# Patient Record
Sex: FEMALE | Race: ASIAN | NOT HISPANIC OR LATINO
[De-identification: names, ages, dates, MRNs, and addresses within clinical notes are randomized per-mention and may not be internally consistent; named-entity substitution may affect disease eponyms.]

---

## 2017-06-05 PROBLEM — Z00.00 ENCOUNTER FOR PREVENTIVE HEALTH EXAMINATION: Status: ACTIVE | Noted: 2017-06-05

## 2017-06-06 ENCOUNTER — APPOINTMENT (OUTPATIENT)
Dept: SURGERY | Facility: CLINIC | Age: 52
End: 2017-06-06

## 2017-06-06 VITALS
HEART RATE: 82 BPM | TEMPERATURE: 98.2 F | BODY MASS INDEX: 30.21 KG/M2 | WEIGHT: 160 LBS | HEIGHT: 61 IN | DIASTOLIC BLOOD PRESSURE: 87 MMHG | SYSTOLIC BLOOD PRESSURE: 134 MMHG

## 2017-06-06 DIAGNOSIS — R73.03 PREDIABETES.: ICD-10-CM

## 2017-06-06 DIAGNOSIS — Z83.3 FAMILY HISTORY OF DIABETES MELLITUS: ICD-10-CM

## 2017-06-06 DIAGNOSIS — K80.20 CALCULUS OF GALLBLADDER W/OUT CHOLECYSTITIS W/OUT OBSTRUCTION: ICD-10-CM

## 2017-06-06 DIAGNOSIS — Z86.79 PERSONAL HISTORY OF OTHER DISEASES OF THE CIRCULATORY SYSTEM: ICD-10-CM

## 2017-06-06 RX ORDER — METFORMIN HYDROCHLORIDE 500 MG/1
500 TABLET, COATED ORAL
Refills: 0 | Status: ACTIVE | COMMUNITY

## 2017-06-06 RX ORDER — LOSARTAN POTASSIUM 100 MG/1
TABLET, FILM COATED ORAL
Refills: 0 | Status: ACTIVE | COMMUNITY

## 2017-06-20 ENCOUNTER — APPOINTMENT (OUTPATIENT)
Dept: SURGERY | Facility: CLINIC | Age: 52
End: 2017-06-20

## 2017-06-28 ENCOUNTER — OUTPATIENT (OUTPATIENT)
Dept: OUTPATIENT SERVICES | Facility: HOSPITAL | Age: 52
LOS: 1 days | End: 2017-06-28
Payer: COMMERCIAL

## 2017-06-28 VITALS
RESPIRATION RATE: 16 BRPM | HEART RATE: 82 BPM | HEIGHT: 61 IN | TEMPERATURE: 99 F | DIASTOLIC BLOOD PRESSURE: 90 MMHG | SYSTOLIC BLOOD PRESSURE: 153 MMHG | WEIGHT: 158.07 LBS

## 2017-06-28 DIAGNOSIS — Z01.818 ENCOUNTER FOR OTHER PREPROCEDURAL EXAMINATION: ICD-10-CM

## 2017-06-28 DIAGNOSIS — E11.9 TYPE 2 DIABETES MELLITUS WITHOUT COMPLICATIONS: ICD-10-CM

## 2017-06-28 DIAGNOSIS — K80.10 CALCULUS OF GALLBLADDER WITH CHRONIC CHOLECYSTITIS WITHOUT OBSTRUCTION: ICD-10-CM

## 2017-06-28 DIAGNOSIS — K80.20 CALCULUS OF GALLBLADDER WITHOUT CHOLECYSTITIS WITHOUT OBSTRUCTION: ICD-10-CM

## 2017-06-28 LAB
ANION GAP SERPL CALC-SCNC: 4 MMOL/L — LOW (ref 5–17)
BUN SERPL-MCNC: 14 MG/DL — SIGNIFICANT CHANGE UP (ref 7–23)
CALCIUM SERPL-MCNC: 8.8 MG/DL — SIGNIFICANT CHANGE UP (ref 8.5–10.1)
CHLORIDE SERPL-SCNC: 105 MMOL/L — SIGNIFICANT CHANGE UP (ref 96–108)
CO2 SERPL-SCNC: 30 MMOL/L — SIGNIFICANT CHANGE UP (ref 22–31)
CREAT SERPL-MCNC: 0.7 MG/DL — SIGNIFICANT CHANGE UP (ref 0.5–1.3)
GLUCOSE SERPL-MCNC: 103 MG/DL — HIGH (ref 70–99)
HBA1C BLD-MCNC: 6 % — HIGH (ref 4–5.6)
HCG UR QL: NEGATIVE — SIGNIFICANT CHANGE UP
HCT VFR BLD CALC: 39 % — SIGNIFICANT CHANGE UP (ref 34.5–45)
HGB BLD-MCNC: 12.4 G/DL — SIGNIFICANT CHANGE UP (ref 11.5–15.5)
MCHC RBC-ENTMCNC: 25.1 PG — LOW (ref 27–34)
MCHC RBC-ENTMCNC: 31.9 GM/DL — LOW (ref 32–36)
MCV RBC AUTO: 78.8 FL — LOW (ref 80–100)
PLATELET # BLD AUTO: 240 K/UL — SIGNIFICANT CHANGE UP (ref 150–400)
POTASSIUM SERPL-MCNC: 4.2 MMOL/L — SIGNIFICANT CHANGE UP (ref 3.5–5.3)
POTASSIUM SERPL-SCNC: 4.2 MMOL/L — SIGNIFICANT CHANGE UP (ref 3.5–5.3)
RBC # BLD: 4.95 M/UL — SIGNIFICANT CHANGE UP (ref 3.8–5.2)
RBC # FLD: 15.8 % — HIGH (ref 10.3–14.5)
SODIUM SERPL-SCNC: 139 MMOL/L — SIGNIFICANT CHANGE UP (ref 135–145)
WBC # BLD: 6.3 K/UL — SIGNIFICANT CHANGE UP (ref 3.8–10.5)
WBC # FLD AUTO: 6.3 K/UL — SIGNIFICANT CHANGE UP (ref 3.8–10.5)

## 2017-06-28 PROCEDURE — 86901 BLOOD TYPING SEROLOGIC RH(D): CPT

## 2017-06-28 PROCEDURE — 93010 ELECTROCARDIOGRAM REPORT: CPT | Mod: NC

## 2017-06-28 PROCEDURE — 86900 BLOOD TYPING SEROLOGIC ABO: CPT

## 2017-06-28 PROCEDURE — G0463: CPT

## 2017-06-28 PROCEDURE — 81025 URINE PREGNANCY TEST: CPT

## 2017-06-28 PROCEDURE — 83036 HEMOGLOBIN GLYCOSYLATED A1C: CPT

## 2017-06-28 PROCEDURE — 86850 RBC ANTIBODY SCREEN: CPT

## 2017-06-28 PROCEDURE — 80048 BASIC METABOLIC PNL TOTAL CA: CPT

## 2017-06-28 PROCEDURE — 36415 COLL VENOUS BLD VENIPUNCTURE: CPT

## 2017-06-28 PROCEDURE — 80076 HEPATIC FUNCTION PANEL: CPT

## 2017-06-28 PROCEDURE — 85027 COMPLETE CBC AUTOMATED: CPT

## 2017-06-28 PROCEDURE — 93005 ELECTROCARDIOGRAM TRACING: CPT

## 2017-06-28 NOTE — H&P PST ADULT - HISTORY OF PRESENT ILLNESS
52 y/o female with the diagnosis of gall stones since last year, went for follow up with PCP, advised to have laparoscopic cholecystectomy. Denies any abdominal pain , nausea or vomiting.

## 2017-06-28 NOTE — H&P PST ADULT - NSANTHOSAYNRD_GEN_A_CORE
No. YEN screening performed.  STOP BANG Legend: 0-2 = LOW Risk; 3-4 = INTERMEDIATE Risk; 5-8 = HIGH Risk

## 2017-06-28 NOTE — H&P PST ADULT - FAMILY HISTORY
Father  Still living? Yes, Estimated age: 81-90  Family history of type 2 diabetes mellitus, Age at diagnosis: Age Unknown     Mother  Still living? Yes, Estimated age: 81-90  Family history of type 2 diabetes mellitus, Age at diagnosis: Age Unknown

## 2017-06-28 NOTE — H&P PST ADULT - VISION (WITH CORRECTIVE LENSES IF THE PATIENT USUALLY WEARS THEM):
glasses to read/Partially impaired: cannot see medication labels or newsprint, but can see obstacles in path, and the surrounding layout; can count fingers at arm's length

## 2017-07-06 ENCOUNTER — OUTPATIENT (OUTPATIENT)
Dept: OUTPATIENT SERVICES | Facility: HOSPITAL | Age: 52
LOS: 1 days | Discharge: HOME | End: 2017-07-06
Payer: COMMERCIAL

## 2017-07-06 ENCOUNTER — RESULT REVIEW (OUTPATIENT)
Age: 52
End: 2017-07-06

## 2017-07-06 ENCOUNTER — TRANSCRIPTION ENCOUNTER (OUTPATIENT)
Age: 52
End: 2017-07-06

## 2017-07-06 DIAGNOSIS — K80.10 CALCULUS OF GALLBLADDER WITH CHRONIC CHOLECYSTITIS WITHOUT OBSTRUCTION: ICD-10-CM

## 2017-07-06 PROCEDURE — 88304 TISSUE EXAM BY PATHOLOGIST: CPT | Mod: 26

## 2017-07-06 PROCEDURE — 47562 LAPAROSCOPIC CHOLECYSTECTOMY: CPT

## 2017-07-06 PROCEDURE — 81025 URINE PREGNANCY TEST: CPT

## 2017-07-06 PROCEDURE — 88304 TISSUE EXAM BY PATHOLOGIST: CPT

## 2017-07-06 PROCEDURE — 47562 LAPAROSCOPIC CHOLECYSTECTOMY: CPT | Mod: AS

## 2017-07-06 RX ORDER — SODIUM CHLORIDE 9 MG/ML
1000 INJECTION, SOLUTION INTRAVENOUS
Qty: 0 | Refills: 0 | Status: DISCONTINUED | OUTPATIENT
Start: 2017-07-06 | End: 2017-07-21

## 2017-07-06 RX ORDER — HYDROMORPHONE HYDROCHLORIDE 2 MG/ML
0.5 INJECTION INTRAMUSCULAR; INTRAVENOUS; SUBCUTANEOUS
Qty: 0 | Refills: 0 | Status: DISCONTINUED | OUTPATIENT
Start: 2017-07-06 | End: 2017-07-06

## 2017-07-06 RX ORDER — OXYCODONE HYDROCHLORIDE 5 MG/1
15 TABLET ORAL ONCE
Qty: 0 | Refills: 0 | Status: DISCONTINUED | OUTPATIENT
Start: 2017-07-06 | End: 2017-07-06

## 2017-07-06 RX ORDER — OXYCODONE HYDROCHLORIDE 5 MG/1
1 TABLET ORAL
Qty: 12 | Refills: 0
Start: 2017-07-06

## 2017-07-10 DIAGNOSIS — I10 ESSENTIAL (PRIMARY) HYPERTENSION: ICD-10-CM

## 2017-07-10 DIAGNOSIS — K80.10 CALCULUS OF GALLBLADDER WITH CHRONIC CHOLECYSTITIS WITHOUT OBSTRUCTION: ICD-10-CM

## 2017-07-10 DIAGNOSIS — E03.9 HYPOTHYROIDISM, UNSPECIFIED: ICD-10-CM

## 2017-07-18 ENCOUNTER — APPOINTMENT (OUTPATIENT)
Dept: SURGERY | Facility: CLINIC | Age: 52
End: 2017-07-18

## 2017-07-18 VITALS
HEART RATE: 83 BPM | HEIGHT: 61 IN | BODY MASS INDEX: 30.21 KG/M2 | SYSTOLIC BLOOD PRESSURE: 136 MMHG | TEMPERATURE: 98.1 F | DIASTOLIC BLOOD PRESSURE: 97 MMHG | WEIGHT: 160 LBS

## 2017-07-18 DIAGNOSIS — Z90.49 ACQUIRED ABSENCE OF OTHER SPECIFIED PARTS OF DIGESTIVE TRACT: ICD-10-CM

## 2017-11-06 NOTE — H&P PST ADULT - ASSESSMENT
ira 50 y/o female with the diagnosis of gall stones, no c/o of nausea or vomiting, some abdominal discomfort only. Pre op testing today. Medical eval with Dr Quinonez.

## 2018-02-17 ENCOUNTER — EMERGENCY (EMERGENCY)
Facility: HOSPITAL | Age: 53
LOS: 1 days | Discharge: HOME | End: 2018-02-17
Attending: EMERGENCY MEDICINE | Admitting: EMERGENCY MEDICINE
Payer: MEDICAID

## 2018-02-17 VITALS
OXYGEN SATURATION: 100 % | TEMPERATURE: 98 F | HEART RATE: 81 BPM | DIASTOLIC BLOOD PRESSURE: 87 MMHG | SYSTOLIC BLOOD PRESSURE: 146 MMHG | RESPIRATION RATE: 18 BRPM

## 2018-02-17 LAB
ALBUMIN SERPL ELPH-MCNC: 4.4 G/DL — SIGNIFICANT CHANGE UP (ref 3.3–5)
ALP SERPL-CCNC: 36 U/L — LOW (ref 40–120)
ALT FLD-CCNC: 30 U/L — SIGNIFICANT CHANGE UP (ref 4–33)
APPEARANCE UR: SIGNIFICANT CHANGE UP
AST SERPL-CCNC: 24 U/L — SIGNIFICANT CHANGE UP (ref 4–32)
BACTERIA # UR AUTO: HIGH
BASOPHILS # BLD AUTO: 0.02 K/UL — SIGNIFICANT CHANGE UP (ref 0–0.2)
BASOPHILS NFR BLD AUTO: 0.3 % — SIGNIFICANT CHANGE UP (ref 0–2)
BILIRUB SERPL-MCNC: 0.4 MG/DL — SIGNIFICANT CHANGE UP (ref 0.2–1.2)
BILIRUB UR-MCNC: NEGATIVE — SIGNIFICANT CHANGE UP
BLOOD UR QL VISUAL: NEGATIVE — SIGNIFICANT CHANGE UP
BUN SERPL-MCNC: 10 MG/DL — SIGNIFICANT CHANGE UP (ref 7–23)
CALCIUM SERPL-MCNC: 8.8 MG/DL — SIGNIFICANT CHANGE UP (ref 8.4–10.5)
CHLORIDE SERPL-SCNC: 104 MMOL/L — SIGNIFICANT CHANGE UP (ref 98–107)
CO2 SERPL-SCNC: 24 MMOL/L — SIGNIFICANT CHANGE UP (ref 22–31)
COLOR SPEC: YELLOW — SIGNIFICANT CHANGE UP
CREAT SERPL-MCNC: 0.72 MG/DL — SIGNIFICANT CHANGE UP (ref 0.5–1.3)
EOSINOPHIL # BLD AUTO: 0.06 K/UL — SIGNIFICANT CHANGE UP (ref 0–0.5)
EOSINOPHIL NFR BLD AUTO: 1 % — SIGNIFICANT CHANGE UP (ref 0–6)
GLUCOSE SERPL-MCNC: 112 MG/DL — HIGH (ref 70–99)
GLUCOSE UR-MCNC: NEGATIVE — SIGNIFICANT CHANGE UP
HCT VFR BLD CALC: 37.5 % — SIGNIFICANT CHANGE UP (ref 34.5–45)
HGB BLD-MCNC: 11.9 G/DL — SIGNIFICANT CHANGE UP (ref 11.5–15.5)
HYALINE CASTS # UR AUTO: SIGNIFICANT CHANGE UP (ref 0–?)
IMM GRANULOCYTES # BLD AUTO: 0.01 # — SIGNIFICANT CHANGE UP
IMM GRANULOCYTES NFR BLD AUTO: 0.2 % — SIGNIFICANT CHANGE UP (ref 0–1.5)
KETONES UR-MCNC: NEGATIVE — SIGNIFICANT CHANGE UP
LEUKOCYTE ESTERASE UR-ACNC: HIGH
LYMPHOCYTES # BLD AUTO: 2.32 K/UL — SIGNIFICANT CHANGE UP (ref 1–3.3)
LYMPHOCYTES # BLD AUTO: 38 % — SIGNIFICANT CHANGE UP (ref 13–44)
MCHC RBC-ENTMCNC: 23.6 PG — LOW (ref 27–34)
MCHC RBC-ENTMCNC: 31.7 % — LOW (ref 32–36)
MCV RBC AUTO: 74.4 FL — LOW (ref 80–100)
MONOCYTES # BLD AUTO: 0.39 K/UL — SIGNIFICANT CHANGE UP (ref 0–0.9)
MONOCYTES NFR BLD AUTO: 6.4 % — SIGNIFICANT CHANGE UP (ref 2–14)
MUCOUS THREADS # UR AUTO: SIGNIFICANT CHANGE UP
NEUTROPHILS # BLD AUTO: 3.3 K/UL — SIGNIFICANT CHANGE UP (ref 1.8–7.4)
NEUTROPHILS NFR BLD AUTO: 54.1 % — SIGNIFICANT CHANGE UP (ref 43–77)
NITRITE UR-MCNC: NEGATIVE — SIGNIFICANT CHANGE UP
NRBC # FLD: 0 — SIGNIFICANT CHANGE UP
PH UR: 5.5 — SIGNIFICANT CHANGE UP (ref 4.6–8)
PLATELET # BLD AUTO: 246 K/UL — SIGNIFICANT CHANGE UP (ref 150–400)
PMV BLD: 11.1 FL — SIGNIFICANT CHANGE UP (ref 7–13)
POTASSIUM SERPL-MCNC: 4.4 MMOL/L — SIGNIFICANT CHANGE UP (ref 3.5–5.3)
POTASSIUM SERPL-SCNC: 4.4 MMOL/L — SIGNIFICANT CHANGE UP (ref 3.5–5.3)
PROT SERPL-MCNC: 7.5 G/DL — SIGNIFICANT CHANGE UP (ref 6–8.3)
PROT UR-MCNC: NEGATIVE MG/DL — SIGNIFICANT CHANGE UP
RBC # BLD: 5.04 M/UL — SIGNIFICANT CHANGE UP (ref 3.8–5.2)
RBC # FLD: 17.6 % — HIGH (ref 10.3–14.5)
RBC CASTS # UR COMP ASSIST: SIGNIFICANT CHANGE UP (ref 0–?)
SODIUM SERPL-SCNC: 141 MMOL/L — SIGNIFICANT CHANGE UP (ref 135–145)
SP GR SPEC: 1.02 — SIGNIFICANT CHANGE UP (ref 1–1.04)
SQUAMOUS # UR AUTO: SIGNIFICANT CHANGE UP
UROBILINOGEN FLD QL: NORMAL MG/DL — SIGNIFICANT CHANGE UP
WBC # BLD: 6.1 K/UL — SIGNIFICANT CHANGE UP (ref 3.8–10.5)
WBC # FLD AUTO: 6.1 K/UL — SIGNIFICANT CHANGE UP (ref 3.8–10.5)
WBC UR QL: HIGH (ref 0–?)
YEAST BUDDING # UR COMP ASSIST: SIGNIFICANT CHANGE UP

## 2018-02-17 PROCEDURE — 99284 EMERGENCY DEPT VISIT MOD MDM: CPT

## 2018-02-17 PROCEDURE — 70450 CT HEAD/BRAIN W/O DYE: CPT | Mod: 26

## 2018-02-17 RX ORDER — CIPROFLOXACIN LACTATE 400MG/40ML
500 VIAL (ML) INTRAVENOUS ONCE
Qty: 0 | Refills: 0 | Status: COMPLETED | OUTPATIENT
Start: 2018-02-17 | End: 2018-02-17

## 2018-02-17 RX ORDER — MOXIFLOXACIN HYDROCHLORIDE TABLETS, 400 MG 400 MG/1
1 TABLET, FILM COATED ORAL
Qty: 14 | Refills: 0
Start: 2018-02-17 | End: 2018-02-23

## 2018-02-17 RX ORDER — SODIUM CHLORIDE 9 MG/ML
1000 INJECTION INTRAMUSCULAR; INTRAVENOUS; SUBCUTANEOUS ONCE
Qty: 0 | Refills: 0 | Status: COMPLETED | OUTPATIENT
Start: 2018-02-17 | End: 2018-02-17

## 2018-02-17 RX ADMIN — Medication 500 MILLIGRAM(S): at 13:17

## 2018-02-17 RX ADMIN — SODIUM CHLORIDE 1000 MILLILITER(S): 9 INJECTION INTRAMUSCULAR; INTRAVENOUS; SUBCUTANEOUS at 11:43

## 2018-02-17 NOTE — ED PROVIDER NOTE - MEDICAL DECISION MAKING DETAILS
pt likely has stres sand anxiety, pt has sub findings of weakness, will do ct head to r/o any remote stroke, which is unlikely , advsied to follow upw ith neurology and rheumatology on outpatient basis

## 2018-02-17 NOTE — ED PROVIDER NOTE - CRANIAL NERVE AND PUPILLARY EXAM
central and peripheral vision intact/extra-ocular movements intact/cough reflex intact/corneal reflex intact/gag reflex intact/tongue is midline/cranial nerves 2-12 intact

## 2018-02-17 NOTE — ED PROVIDER NOTE - OBJECTIVE STATEMENT
53 y/o F with no known medical problems except unspecified rt sided weakness off on for years p/w recurrent rt sided weakness for 3 days , both rt upper arm and rt leg. Pt states that she basically came for her  who has chest pain but wanted to check herself out as well as she has stress and now her symptoms are worsening. Pt had mri once long time ago for same complaint. No fall ro trauma , no other weakness, numbness, no difficulty walking. No arthalgia or joint stiffness reported by patient.

## 2018-02-17 NOTE — ED ADULT TRIAGE NOTE - CHIEF COMPLAINT QUOTE
Pt accompanied by  who is c/o chest pain--pt states while she is here she would also like to be seen for rt sided pain from shoulder to leg with tingling and heaviness x2 days  Pt states that she has had a prior MRI that showed rt sided weakness?--and these symptoms come and go last event was 6 months ago  No slurred speech/facial droop/ and difficulty walking--no numbness

## 2018-02-17 NOTE — ED PROVIDER NOTE - PROGRESS NOTE DETAILS
Damien ALONZO- pt told about findings, advsied to take cipro for mild uti, drink lots of fludis and follow up as outpatient

## 2018-07-28 ENCOUNTER — EMERGENCY (EMERGENCY)
Facility: HOSPITAL | Age: 53
LOS: 1 days | Discharge: HOME | End: 2018-07-28
Attending: EMERGENCY MEDICINE | Admitting: EMERGENCY MEDICINE
Payer: MEDICAID

## 2018-07-28 VITALS
OXYGEN SATURATION: 100 % | RESPIRATION RATE: 16 BRPM | SYSTOLIC BLOOD PRESSURE: 175 MMHG | HEART RATE: 88 BPM | DIASTOLIC BLOOD PRESSURE: 103 MMHG

## 2018-07-28 VITALS
OXYGEN SATURATION: 100 % | SYSTOLIC BLOOD PRESSURE: 161 MMHG | DIASTOLIC BLOOD PRESSURE: 97 MMHG | HEART RATE: 80 BPM | TEMPERATURE: 98 F | RESPIRATION RATE: 16 BRPM

## 2018-07-28 LAB
ALBUMIN SERPL ELPH-MCNC: 4.6 G/DL — SIGNIFICANT CHANGE UP (ref 3.3–5)
ALP SERPL-CCNC: 50 U/L — SIGNIFICANT CHANGE UP (ref 40–120)
ALT FLD-CCNC: 52 U/L — HIGH (ref 4–33)
AST SERPL-CCNC: 34 U/L — HIGH (ref 4–32)
BASOPHILS # BLD AUTO: 0.02 K/UL — SIGNIFICANT CHANGE UP (ref 0–0.2)
BASOPHILS NFR BLD AUTO: 0.2 % — SIGNIFICANT CHANGE UP (ref 0–2)
BILIRUB SERPL-MCNC: 0.5 MG/DL — SIGNIFICANT CHANGE UP (ref 0.2–1.2)
BUN SERPL-MCNC: 11 MG/DL — SIGNIFICANT CHANGE UP (ref 7–23)
CALCIUM SERPL-MCNC: 9 MG/DL — SIGNIFICANT CHANGE UP (ref 8.4–10.5)
CHLORIDE SERPL-SCNC: 99 MMOL/L — SIGNIFICANT CHANGE UP (ref 98–107)
CO2 SERPL-SCNC: 24 MMOL/L — SIGNIFICANT CHANGE UP (ref 22–31)
CREAT SERPL-MCNC: 0.72 MG/DL — SIGNIFICANT CHANGE UP (ref 0.5–1.3)
CRP SERPL-MCNC: 7.3 MG/L — HIGH
EOSINOPHIL # BLD AUTO: 0.02 K/UL — SIGNIFICANT CHANGE UP (ref 0–0.5)
EOSINOPHIL NFR BLD AUTO: 0.2 % — SIGNIFICANT CHANGE UP (ref 0–6)
ERYTHROCYTE [SEDIMENTATION RATE] IN BLOOD: 12 MM/HR — SIGNIFICANT CHANGE UP (ref 4–25)
GLUCOSE SERPL-MCNC: 113 MG/DL — HIGH (ref 70–99)
HCT VFR BLD CALC: 37.7 % — SIGNIFICANT CHANGE UP (ref 34.5–45)
HGB BLD-MCNC: 12 G/DL — SIGNIFICANT CHANGE UP (ref 11.5–15.5)
IMM GRANULOCYTES # BLD AUTO: 0.02 # — SIGNIFICANT CHANGE UP
IMM GRANULOCYTES NFR BLD AUTO: 0.2 % — SIGNIFICANT CHANGE UP (ref 0–1.5)
LYMPHOCYTES # BLD AUTO: 1.74 K/UL — SIGNIFICANT CHANGE UP (ref 1–3.3)
LYMPHOCYTES # BLD AUTO: 20.1 % — SIGNIFICANT CHANGE UP (ref 13–44)
MCHC RBC-ENTMCNC: 24.1 PG — LOW (ref 27–34)
MCHC RBC-ENTMCNC: 31.8 % — LOW (ref 32–36)
MCV RBC AUTO: 75.9 FL — LOW (ref 80–100)
MONOCYTES # BLD AUTO: 0.68 K/UL — SIGNIFICANT CHANGE UP (ref 0–0.9)
MONOCYTES NFR BLD AUTO: 7.8 % — SIGNIFICANT CHANGE UP (ref 2–14)
NEUTROPHILS # BLD AUTO: 6.19 K/UL — SIGNIFICANT CHANGE UP (ref 1.8–7.4)
NEUTROPHILS NFR BLD AUTO: 71.5 % — SIGNIFICANT CHANGE UP (ref 43–77)
NRBC # FLD: 0 — SIGNIFICANT CHANGE UP
PLATELET # BLD AUTO: 273 K/UL — SIGNIFICANT CHANGE UP (ref 150–400)
PMV BLD: 11.3 FL — SIGNIFICANT CHANGE UP (ref 7–13)
POTASSIUM SERPL-MCNC: 4.5 MMOL/L — SIGNIFICANT CHANGE UP (ref 3.5–5.3)
POTASSIUM SERPL-SCNC: 4.5 MMOL/L — SIGNIFICANT CHANGE UP (ref 3.5–5.3)
PROT SERPL-MCNC: 8.1 G/DL — SIGNIFICANT CHANGE UP (ref 6–8.3)
RBC # BLD: 4.97 M/UL — SIGNIFICANT CHANGE UP (ref 3.8–5.2)
RBC # FLD: 18 % — HIGH (ref 10.3–14.5)
SODIUM SERPL-SCNC: 137 MMOL/L — SIGNIFICANT CHANGE UP (ref 135–145)
URATE SERPL-MCNC: 3.9 MG/DL — SIGNIFICANT CHANGE UP (ref 2.5–7)
WBC # BLD: 8.67 K/UL — SIGNIFICANT CHANGE UP (ref 3.8–10.5)
WBC # FLD AUTO: 8.67 K/UL — SIGNIFICANT CHANGE UP (ref 3.8–10.5)

## 2018-07-28 PROCEDURE — 73110 X-RAY EXAM OF WRIST: CPT | Mod: 26,LT

## 2018-07-28 PROCEDURE — 99284 EMERGENCY DEPT VISIT MOD MDM: CPT

## 2018-07-28 RX ORDER — AMPICILLIN SODIUM AND SULBACTAM SODIUM 250; 125 MG/ML; MG/ML
INJECTION, POWDER, FOR SUSPENSION INTRAMUSCULAR; INTRAVENOUS
Qty: 0 | Refills: 0 | Status: DISCONTINUED | OUTPATIENT
Start: 2018-07-28 | End: 2018-08-01

## 2018-07-28 RX ORDER — ACETAMINOPHEN 500 MG
650 TABLET ORAL ONCE
Qty: 0 | Refills: 0 | Status: COMPLETED | OUTPATIENT
Start: 2018-07-28 | End: 2018-07-28

## 2018-07-28 RX ORDER — OXYCODONE AND ACETAMINOPHEN 5; 325 MG/1; MG/1
1 TABLET ORAL ONCE
Qty: 0 | Refills: 0 | Status: DISCONTINUED | OUTPATIENT
Start: 2018-07-28 | End: 2018-07-28

## 2018-07-28 RX ORDER — INDOMETHACIN 50 MG
1 CAPSULE ORAL
Qty: 21 | Refills: 0
Start: 2018-07-28 | End: 2018-08-03

## 2018-07-28 RX ORDER — AMPICILLIN SODIUM AND SULBACTAM SODIUM 250; 125 MG/ML; MG/ML
3 INJECTION, POWDER, FOR SUSPENSION INTRAMUSCULAR; INTRAVENOUS EVERY 6 HOURS
Qty: 0 | Refills: 0 | Status: DISCONTINUED | OUTPATIENT
Start: 2018-07-29 | End: 2018-08-01

## 2018-07-28 RX ORDER — AMPICILLIN SODIUM AND SULBACTAM SODIUM 250; 125 MG/ML; MG/ML
3 INJECTION, POWDER, FOR SUSPENSION INTRAMUSCULAR; INTRAVENOUS ONCE
Qty: 0 | Refills: 0 | Status: COMPLETED | OUTPATIENT
Start: 2018-07-28 | End: 2018-07-28

## 2018-07-28 RX ORDER — IBUPROFEN 200 MG
600 TABLET ORAL ONCE
Qty: 0 | Refills: 0 | Status: COMPLETED | OUTPATIENT
Start: 2018-07-28 | End: 2018-07-28

## 2018-07-28 RX ADMIN — Medication 600 MILLIGRAM(S): at 21:48

## 2018-07-28 RX ADMIN — OXYCODONE AND ACETAMINOPHEN 1 TABLET(S): 5; 325 TABLET ORAL at 21:59

## 2018-07-28 RX ADMIN — AMPICILLIN SODIUM AND SULBACTAM SODIUM 3 GRAM(S): 250; 125 INJECTION, POWDER, FOR SUSPENSION INTRAMUSCULAR; INTRAVENOUS at 21:59

## 2018-07-28 RX ADMIN — Medication 650 MILLIGRAM(S): at 19:45

## 2018-07-28 RX ADMIN — Medication 650 MILLIGRAM(S): at 21:48

## 2018-07-28 RX ADMIN — Medication 600 MILLIGRAM(S): at 19:45

## 2018-07-28 RX ADMIN — OXYCODONE AND ACETAMINOPHEN 1 TABLET(S): 5; 325 TABLET ORAL at 21:52

## 2018-07-28 RX ADMIN — AMPICILLIN SODIUM AND SULBACTAM SODIUM 200 GRAM(S): 250; 125 INJECTION, POWDER, FOR SUSPENSION INTRAMUSCULAR; INTRAVENOUS at 21:30

## 2018-07-28 NOTE — ED PROVIDER NOTE - CARE PLAN
Principal Discharge DX:	Hand pain, left  Assessment and plan of treatment:	Follow up with a primary care doctor within 48-72 hours.  You can call: [facility] Find a Physician helpline ([phone]) for assistance. Take copies of your reports given to you.  Take all of your medications as previously prescribed. Take the antibiotic Augmentin 875mg 1 tab 2x/day for 7 days. Take Indomethacin 50mg 1 tab 3x/day for pain and swelling. Return to the emergency department with increased swelling, pain, redness, fever, chills, nausea, vomiting or ANY new concerning symptoms.

## 2018-07-28 NOTE — ED PROVIDER NOTE - PROGRESS NOTE DETAILS
SAGE KAISER TIBERIO- pt still with significant swelling and pain. Labs stable. Xray negative for acute fracture. Will send to CDU for continued pain management/NSAIDS, ABX and reassessment. Likely cellulitis vs gout. SAGE MACKENZIE- Pt refusing to stay. Wants to go home and try antibiotic at her house. Understands to risks of worsening infection. Agrees to return with increased swelling, pain, redness, fever, chills, nausea, vomiting or any new concerning symptoms.  Will DC home on Augmentin/Indomethacin with PMD follow up. Copies of all reports given to pt.

## 2018-07-28 NOTE — ED PROVIDER NOTE - PLAN OF CARE
Follow up with a primary care doctor within 48-72 hours.  You can call: [facility] Find a Physician helpline ([phone]) for assistance. Take copies of your reports given to you.  Take all of your medications as previously prescribed. Take the antibiotic Augmentin 875mg 1 tab 2x/day for 7 days. Take Indomethacin 50mg 1 tab 3x/day for pain and swelling. Return to the emergency department with increased swelling, pain, redness, fever, chills, nausea, vomiting or ANY new concerning symptoms.

## 2018-07-28 NOTE — ED PROVIDER NOTE - MEDICAL DECISION MAKING DETAILS
54y/o F with atraumatic L wrist pain. DDx includes Gout vs. Cellulitis. Unlikely to be septic joint. Will perform XR, labs including ESR, CRP, and uric acid, pain control, reassess.

## 2018-07-28 NOTE — ED PROVIDER NOTE - UPPER EXTREMITY EXAM, LEFT
Dorsum of L wrist warm, mild erythema, and TTP. Neurovasuclarloy intact, 2+ radial pulses. No crepitus, no bony deformity.

## 2018-07-28 NOTE — ED ADULT NURSE NOTE - OBJECTIVE STATEMENT
pt AO x3, ambulatory, c/o left hand pain beginning yesterday night. She now has some mild swelling to the area and radiation to her L wrist and forearm. Denies chest pain, dizziness, SOB, fever, n/v at this time. Evaluated by provider. Blood obtained and sent to LAB. IV inserted on right arm 20G. Due medication given as ordered. Will continue to monitor. Awaiting further study.

## 2018-07-28 NOTE — ED PROVIDER NOTE - OBJECTIVE STATEMENT
54y/o F with PMHx of HTN, DM, presents to the ED with L hand pain beginning yesterday night. She now has some mild swelling to the area and radiation to her L wrist and forearm. Pt has been taking Advil to mild relief. Denies numbness/tingling, fever, vomiting, trauma, fall, injury, any other complaints. NKDA.

## 2018-07-29 PROBLEM — E11.9 TYPE 2 DIABETES MELLITUS WITHOUT COMPLICATIONS: Chronic | Status: ACTIVE | Noted: 2017-06-28

## 2018-07-29 PROBLEM — I10 ESSENTIAL (PRIMARY) HYPERTENSION: Chronic | Status: ACTIVE | Noted: 2017-06-28

## 2019-09-16 NOTE — H&P PST ADULT - RS GEN PE MLT RESP DETAILS PC
normal breath sounds equal/normal/good air movement/airway patent/clear to auscultation bilaterally/respirations non-labored

## 2020-03-28 NOTE — ED PROVIDER NOTE - CROS ED NEURO ALL NEG
negative...
76 y/o F with CAD s/p PCI 2014, HTN, HLD admitted with hypoxic respiratory failure.   Patient has anemia, thrombocytopenia and JENI. Patient has protein band on SPEP with suspected MDS vs MGUS/ plasma cell dyscrasia, but states that she had a negative BM bx for malignancy.

## 2021-06-26 ENCOUNTER — INPATIENT (INPATIENT)
Facility: HOSPITAL | Age: 56
LOS: 6 days | Discharge: HOME | End: 2021-07-03
Attending: INTERNAL MEDICINE | Admitting: INTERNAL MEDICINE
Payer: MEDICAID

## 2021-06-26 VITALS
SYSTOLIC BLOOD PRESSURE: 172 MMHG | RESPIRATION RATE: 18 BRPM | TEMPERATURE: 98 F | HEART RATE: 92 BPM | HEIGHT: 61 IN | OXYGEN SATURATION: 100 % | DIASTOLIC BLOOD PRESSURE: 90 MMHG

## 2021-06-26 DIAGNOSIS — A15.9 RESPIRATORY TUBERCULOSIS UNSPECIFIED: ICD-10-CM

## 2021-06-26 LAB
ALBUMIN SERPL ELPH-MCNC: 4.5 G/DL — SIGNIFICANT CHANGE UP (ref 3.3–5)
ALP SERPL-CCNC: 46 U/L — SIGNIFICANT CHANGE UP (ref 40–120)
ALT FLD-CCNC: 28 U/L — SIGNIFICANT CHANGE UP (ref 4–33)
ANION GAP SERPL CALC-SCNC: 13 MMOL/L — SIGNIFICANT CHANGE UP (ref 7–14)
AST SERPL-CCNC: 19 U/L — SIGNIFICANT CHANGE UP (ref 4–32)
BASOPHILS # BLD AUTO: 0.01 K/UL — SIGNIFICANT CHANGE UP (ref 0–0.2)
BASOPHILS NFR BLD AUTO: 0.2 % — SIGNIFICANT CHANGE UP (ref 0–2)
BILIRUB SERPL-MCNC: 0.4 MG/DL — SIGNIFICANT CHANGE UP (ref 0.2–1.2)
BUN SERPL-MCNC: 14 MG/DL — SIGNIFICANT CHANGE UP (ref 7–23)
CALCIUM SERPL-MCNC: 9.6 MG/DL — SIGNIFICANT CHANGE UP (ref 8.4–10.5)
CHLORIDE SERPL-SCNC: 105 MMOL/L — SIGNIFICANT CHANGE UP (ref 98–107)
CO2 SERPL-SCNC: 23 MMOL/L — SIGNIFICANT CHANGE UP (ref 22–31)
CREAT SERPL-MCNC: 0.72 MG/DL — SIGNIFICANT CHANGE UP (ref 0.5–1.3)
EOSINOPHIL # BLD AUTO: 0.03 K/UL — SIGNIFICANT CHANGE UP (ref 0–0.5)
EOSINOPHIL NFR BLD AUTO: 0.5 % — SIGNIFICANT CHANGE UP (ref 0–6)
GLUCOSE SERPL-MCNC: 98 MG/DL — SIGNIFICANT CHANGE UP (ref 70–99)
HCT VFR BLD CALC: 39 % — SIGNIFICANT CHANGE UP (ref 34.5–45)
HGB BLD-MCNC: 12.8 G/DL — SIGNIFICANT CHANGE UP (ref 11.5–15.5)
IANC: 3.24 K/UL — SIGNIFICANT CHANGE UP (ref 1.5–8.5)
IMM GRANULOCYTES NFR BLD AUTO: 0.3 % — SIGNIFICANT CHANGE UP (ref 0–1.5)
LYMPHOCYTES # BLD AUTO: 2.11 K/UL — SIGNIFICANT CHANGE UP (ref 1–3.3)
LYMPHOCYTES # BLD AUTO: 36.1 % — SIGNIFICANT CHANGE UP (ref 13–44)
MCHC RBC-ENTMCNC: 27.4 PG — SIGNIFICANT CHANGE UP (ref 27–34)
MCHC RBC-ENTMCNC: 32.8 GM/DL — SIGNIFICANT CHANGE UP (ref 32–36)
MCV RBC AUTO: 83.3 FL — SIGNIFICANT CHANGE UP (ref 80–100)
MONOCYTES # BLD AUTO: 0.44 K/UL — SIGNIFICANT CHANGE UP (ref 0–0.9)
MONOCYTES NFR BLD AUTO: 7.5 % — SIGNIFICANT CHANGE UP (ref 2–14)
NEUTROPHILS # BLD AUTO: 3.24 K/UL — SIGNIFICANT CHANGE UP (ref 1.8–7.4)
NEUTROPHILS NFR BLD AUTO: 55.4 % — SIGNIFICANT CHANGE UP (ref 43–77)
NRBC # BLD: 0 /100 WBCS — SIGNIFICANT CHANGE UP
NRBC # FLD: 0 K/UL — SIGNIFICANT CHANGE UP
PLATELET # BLD AUTO: 232 K/UL — SIGNIFICANT CHANGE UP (ref 150–400)
POTASSIUM SERPL-MCNC: 4.2 MMOL/L — SIGNIFICANT CHANGE UP (ref 3.5–5.3)
POTASSIUM SERPL-SCNC: 4.2 MMOL/L — SIGNIFICANT CHANGE UP (ref 3.5–5.3)
PROT SERPL-MCNC: 7.5 G/DL — SIGNIFICANT CHANGE UP (ref 6–8.3)
RBC # BLD: 4.68 M/UL — SIGNIFICANT CHANGE UP (ref 3.8–5.2)
RBC # FLD: 13.7 % — SIGNIFICANT CHANGE UP (ref 10.3–14.5)
SODIUM SERPL-SCNC: 141 MMOL/L — SIGNIFICANT CHANGE UP (ref 135–145)
WBC # BLD: 5.85 K/UL — SIGNIFICANT CHANGE UP (ref 3.8–10.5)
WBC # FLD AUTO: 5.85 K/UL — SIGNIFICANT CHANGE UP (ref 3.8–10.5)

## 2021-06-26 PROCEDURE — 99285 EMERGENCY DEPT VISIT HI MDM: CPT

## 2021-06-26 PROCEDURE — 71045 X-RAY EXAM CHEST 1 VIEW: CPT | Mod: 26

## 2021-06-26 NOTE — ED PROVIDER NOTE - CLINICAL SUMMARY MEDICAL DECISION MAKING FREE TEXT BOX
Pt sent in by her pulm d/t abnormal imaging after PNA tx (PET, CT).  Sent for rt/o Tb and for further eval of possibhle neoplasm.  Will sendl abs, sputum, quantiferon, admit. Dr. Carrillo requesting admit to Dr. Saleem and states he also d/w Pulm fellow at [facility].

## 2021-06-26 NOTE — ED ADULT NURSE NOTE - OBJECTIVE STATEMENT
55 yr old female pt presents to ED for evaluation. pt referred by PCP for possible lung mass vs. TB after having a follow up chest CT s/p pneumonia. pt axox4 in nad steady gait admits to mild non productive cough, denies any cp sob fever chills night sweats hemoptysis. pt evaluated by provider, labwork collected, iv placed 20g to right AC awaiting results and disposition.

## 2021-06-26 NOTE — ED PROVIDER NOTE - OBJECTIVE STATEMENT
55 yr old woman c h/o Tb (many years ago), HTN, pre-DM, recently tx'd for PNA sent in by her pulmonologist for persistent upper lobe lung changes on CT concerning for Tb vs neoplasm. In need of BAL, sputums, bx per documentation (in paper chart) . States she geenrally feels well, but feels some right upper lung discomfort.  No feevr.chills.  NO cough.  No night sweats.  No N/V/D. No travel or sick contacts.     Dr. Jhonathan Carrillo (Pulm)

## 2021-06-26 NOTE — ED ADULT TRIAGE NOTE - CHIEF COMPLAINT QUOTE
Arrives with CT scan result showing possible mass vs TB. Pt states CT chest was follow up from pneumonia several months ago. Pt denies complaints. PMH Dm2

## 2021-06-27 DIAGNOSIS — Z29.9 ENCOUNTER FOR PROPHYLACTIC MEASURES, UNSPECIFIED: ICD-10-CM

## 2021-06-27 DIAGNOSIS — R91.8 OTHER NONSPECIFIC ABNORMAL FINDING OF LUNG FIELD: ICD-10-CM

## 2021-06-27 DIAGNOSIS — K63.9 DISEASE OF INTESTINE, UNSPECIFIED: ICD-10-CM

## 2021-06-27 DIAGNOSIS — E11.69 TYPE 2 DIABETES MELLITUS WITH OTHER SPECIFIED COMPLICATION: ICD-10-CM

## 2021-06-27 DIAGNOSIS — I10 ESSENTIAL (PRIMARY) HYPERTENSION: ICD-10-CM

## 2021-06-27 LAB
COVID-19 SPIKE DOMAIN AB INTERP: POSITIVE
COVID-19 SPIKE DOMAIN ANTIBODY RESULT: >250 U/ML — HIGH
GLUCOSE BLDC GLUCOMTR-MCNC: 110 MG/DL — HIGH (ref 70–99)
GLUCOSE BLDC GLUCOMTR-MCNC: 116 MG/DL — HIGH (ref 70–99)
GLUCOSE BLDC GLUCOMTR-MCNC: 122 MG/DL — HIGH (ref 70–99)
GLUCOSE BLDC GLUCOMTR-MCNC: 149 MG/DL — HIGH (ref 70–99)
SARS-COV-2 IGG+IGM SERPL QL IA: >250 U/ML — HIGH
SARS-COV-2 IGG+IGM SERPL QL IA: POSITIVE
SARS-COV-2 RNA SPEC QL NAA+PROBE: SIGNIFICANT CHANGE UP

## 2021-06-27 PROCEDURE — 99223 1ST HOSP IP/OBS HIGH 75: CPT | Mod: GC

## 2021-06-27 PROCEDURE — 99233 SBSQ HOSP IP/OBS HIGH 50: CPT | Mod: GC

## 2021-06-27 PROCEDURE — 99223 1ST HOSP IP/OBS HIGH 75: CPT

## 2021-06-27 RX ORDER — INSULIN LISPRO 100/ML
VIAL (ML) SUBCUTANEOUS AT BEDTIME
Refills: 0 | Status: DISCONTINUED | OUTPATIENT
Start: 2021-06-27 | End: 2021-07-03

## 2021-06-27 RX ORDER — GLUCAGON INJECTION, SOLUTION 0.5 MG/.1ML
1 INJECTION, SOLUTION SUBCUTANEOUS ONCE
Refills: 0 | Status: DISCONTINUED | OUTPATIENT
Start: 2021-06-27 | End: 2021-07-03

## 2021-06-27 RX ORDER — SODIUM CHLORIDE 9 MG/ML
1000 INJECTION, SOLUTION INTRAVENOUS
Refills: 0 | Status: DISCONTINUED | OUTPATIENT
Start: 2021-06-27 | End: 2021-07-03

## 2021-06-27 RX ORDER — DEXTROSE 50 % IN WATER 50 %
12.5 SYRINGE (ML) INTRAVENOUS ONCE
Refills: 0 | Status: DISCONTINUED | OUTPATIENT
Start: 2021-06-27 | End: 2021-07-03

## 2021-06-27 RX ORDER — LOSARTAN POTASSIUM 100 MG/1
1 TABLET, FILM COATED ORAL
Qty: 0 | Refills: 0 | DISCHARGE

## 2021-06-27 RX ORDER — DEXTROSE 50 % IN WATER 50 %
15 SYRINGE (ML) INTRAVENOUS ONCE
Refills: 0 | Status: DISCONTINUED | OUTPATIENT
Start: 2021-06-27 | End: 2021-07-03

## 2021-06-27 RX ORDER — DEXTROSE 50 % IN WATER 50 %
25 SYRINGE (ML) INTRAVENOUS ONCE
Refills: 0 | Status: DISCONTINUED | OUTPATIENT
Start: 2021-06-27 | End: 2021-07-03

## 2021-06-27 RX ORDER — LOSARTAN POTASSIUM 100 MG/1
100 TABLET, FILM COATED ORAL DAILY
Refills: 0 | Status: DISCONTINUED | OUTPATIENT
Start: 2021-06-27 | End: 2021-07-03

## 2021-06-27 RX ORDER — INSULIN LISPRO 100/ML
VIAL (ML) SUBCUTANEOUS
Refills: 0 | Status: DISCONTINUED | OUTPATIENT
Start: 2021-06-27 | End: 2021-07-02

## 2021-06-27 RX ADMIN — LOSARTAN POTASSIUM 100 MILLIGRAM(S): 100 TABLET, FILM COATED ORAL at 06:30

## 2021-06-27 RX ADMIN — Medication 1 TABLET(S): at 13:27

## 2021-06-27 NOTE — CONSULT NOTE ADULT - SUBJECTIVE AND OBJECTIVE BOX
CHIEF COMPLAINT:    HPI:    PAST MEDICAL & SURGICAL HISTORY:  Type 2 diabetes mellitus    Hypertension    No pertinent past medical history    No significant past surgical history    No significant past surgical history        FAMILY HISTORY:  Family history of type 2 diabetes mellitus (Father)    Family history of type 2 diabetes mellitus (Mother)        SOCIAL HISTORY:  Smoking: [ ] Never Smoked [ ] Former Smoker (__ packs x ___ years) [ ] Current Smoker  (__ packs x ___ years)  Substance Use: [ ] Never Used [ ] Used ____  EtOH Use:  Marital Status: [ ] Single [ ]  [ ]  [ ]   Sexual History:   Occupation:  Recent Travel:  Country of Birth:  Advance Directives:    Allergies    No Known Allergies    Intolerances        HOME MEDICATIONS:  Home Medications:  losartan 25 mg oral tablet: 4 tab(s) orally once a day (27 Jun 2021 01:55)  metFORMIN 500 mg oral tablet: 1 tab(s) orally 2 times a day (27 Jun 2021 01:55)  multivitamin:   once daily (06 Jul 2017 08:45)      REVIEW OF SYSTEMS:  Constitutional: [ ] negative [ ] fevers [ ] chills [ ] weight loss [ ] weight gain  HEENT: [ ] negative [ ] dry eyes [ ] eye irritation [ ] postnasal drip [ ] nasal congestion  CV: [ ] negative  [ ] chest pain [ ] orthopnea [ ] palpitations [ ] murmur  Resp: [ ] negative [ ] cough [ ] shortness of breath [ ] dyspnea [ ] wheezing [ ] sputum [ ] hemoptysis  GI: [ ] negative [ ] nausea [ ] vomiting [ ] diarrhea [ ] constipation [ ] abd pain [ ] dysphagia   : [ ] negative [ ] dysuria [ ] nocturia [ ] hematuria [ ] increased urinary frequency  Musculoskeletal: [ ] negative [ ] back pain [ ] myalgias [ ] arthralgias [ ] fracture  Skin: [ ] negative [ ] rash [ ] itch  Neurological: [ ] negative [ ] headache [ ] dizziness [ ] syncope [ ] weakness [ ] numbness  Psychiatric: [ ] negative [ ] anxiety [ ] depression  Endocrine: [ ] negative [ ] diabetes [ ] thyroid problem  Hematologic/Lymphatic: [ ] negative [ ] anemia [ ] bleeding problem  Allergic/Immunologic: [ ] negative [ ] itchy eyes [ ] nasal discharge [ ] hives [ ] angioedema  [ ] All other systems negative  [ ] Unable to assess ROS because ________    OBJECTIVE:  ICU Vital Signs Last 24 Hrs  T(C): 37.2 (27 Jun 2021 13:33), Max: 37.2 (27 Jun 2021 13:33)  T(F): 98.9 (27 Jun 2021 13:33), Max: 98.9 (27 Jun 2021 13:33)  HR: 85 (27 Jun 2021 13:33) (70 - 92)  BP: 142/72 (27 Jun 2021 13:33) (139/85 - 172/90)  BP(mean): --  ABP: --  ABP(mean): --  RR: 16 (27 Jun 2021 13:33) (16 - 18)  SpO2: 100% (27 Jun 2021 13:33) (99% - 100%)        CAPILLARY BLOOD GLUCOSE      POCT Blood Glucose.: 122 mg/dL (27 Jun 2021 13:03)      PHYSICAL EXAM:  General:   HEENT:   Lymph Nodes:  Neck:   Respiratory:   Cardiovascular:   Abdomen:   Extremities:   Skin:   Neurological:  Psychiatry:    LINES:     HOSPITAL MEDICATIONS:  Standing Meds:  dextrose 40% Gel 15 Gram(s) Oral once  dextrose 5%. 1000 milliLiter(s) IV Continuous <Continuous>  dextrose 5%. 1000 milliLiter(s) IV Continuous <Continuous>  dextrose 50% Injectable 25 Gram(s) IV Push once  dextrose 50% Injectable 12.5 Gram(s) IV Push once  dextrose 50% Injectable 25 Gram(s) IV Push once  glucagon  Injectable 1 milliGRAM(s) IntraMuscular once  insulin lispro (ADMELOG) corrective regimen sliding scale   SubCutaneous three times a day before meals  insulin lispro (ADMELOG) corrective regimen sliding scale   SubCutaneous at bedtime  losartan 100 milliGRAM(s) Oral daily  multivitamin 1 Tablet(s) Oral daily      PRN Meds:      LABS:                        12.8   5.85  )-----------( 232      ( 26 Jun 2021 21:50 )             39.0     Hgb Trend: 12.8<--  06-26    141  |  105  |  14  ----------------------------<  98  4.2   |  23  |  0.72    Ca    9.6      26 Jun 2021 21:50    TPro  7.5  /  Alb  4.5  /  TBili  0.4  /  DBili  x   /  AST  19  /  ALT  28  /  AlkPhos  46  06-26    Creatinine Trend: 0.72<--            MICROBIOLOGY:       RADIOLOGY:  [ ] Reviewed and interpreted by me    PULMONARY FUNCTION TESTS:    EKG: CHIEF COMPLAINT: Abnormal CT chest findings    HPI:    54 yo F originally from [place], been in [place], last in [place] 2007. T2DM, HTN sent in by the patient's pulmonologist for persistent abnormal findings on CT chest. Pt reports being treated for PNA in December at that time patient was found to have mycoplasma positive. Was treated with abx. Patient had serial CT scan so the chest in Dec, March and a PET scan in June. Patient still had persistent opacity in the RUL. Outpatient pulm sent patient in to R/O MTB infection. Patient reported was quant positive in 1990's s/p treatment for what sounds like latent TB but only too 1 month of medication. No known exposure, lung disease, FH of lung Cancers.   Currently has not respiratory complaints.       PAST MEDICAL & SURGICAL HISTORY:  Type 2 diabetes mellitus    Hypertension    No pertinent past medical history    No significant past surgical history    No significant past surgical history        FAMILY HISTORY:  Family history of type 2 diabetes mellitus (Father)    Family history of type 2 diabetes mellitus (Mother)        SOCIAL HISTORY:  Smoking: [ x] Never Smoked [ ] Former Smoker (__ packs x ___ years) [ ] Current Smoker  (__ packs x ___ years)  Substance Use: [ x] Never Used [ ] Used ____  EtOH Use: None  Marital Status: [ ] Single [ x]  [ ]  [ ]       Allergies    No Known Allergies    Intolerances        HOME MEDICATIONS:  Home Medications:  losartan 25 mg oral tablet: 4 tab(s) orally once a day (27 Jun 2021 01:55)  metFORMIN 500 mg oral tablet: 1 tab(s) orally 2 times a day (27 Jun 2021 01:55)  multivitamin:   once daily (06 Jul 2017 08:45)      REVIEW OF SYSTEMS:  Constitutional: [x ] negative [ ] fevers [ ] chills [ ] weight loss [ ] weight gain  HEENT: [x ] negative [ ] dry eyes [ ] eye irritation [ ] postnasal drip [ ] nasal congestion  CV: [ x] negative  [ ] chest pain [ ] orthopnea [ ] palpitations [ ] murmur  Resp: [x ] negative [ ] cough [ ] shortness of breath [ ] dyspnea [ ] wheezing [ ] sputum [ ] hemoptysis  GI: [ x] negative [ ] nausea [ ] vomiting [ ] diarrhea [ ] constipation [ ] abd pain [ ] dysphagia   : [x ] negative [ ] dysuria [ ] nocturia [ ] hematuria [ ] increased urinary frequency  Musculoskeletal: [ x] negative [ ] back pain [ ] myalgias [ ] arthralgias [ ] fracture  Skin: [x ] negative [ ] rash [ ] itch  Neurological: [ x] negative [ ] headache [ ] dizziness [ ] syncope [ ] weakness [ ] numbness  Psychiatric: [ x] negative [ ] anxiety [ ] depression  Endocrine: [ x] negative [ ] diabetes [ ] thyroid problem  Hematologic/Lymphatic: [ x] negative [ ] anemia [ ] bleeding problem  Allergic/Immunologic: [ x] negative [ ] itchy eyes [ ] nasal discharge [ ] hives [ ] angioedema  [ ] All other systems negative  [ ] Unable to assess ROS because ________    OBJECTIVE:  ICU Vital Signs Last 24 Hrs  T(C): 37.2 (27 Jun 2021 13:33), Max: 37.2 (27 Jun 2021 13:33)  T(F): 98.9 (27 Jun 2021 13:33), Max: 98.9 (27 Jun 2021 13:33)  HR: 85 (27 Jun 2021 13:33) (70 - 92)  BP: 142/72 (27 Jun 2021 13:33) (139/85 - 172/90)  BP(mean): --  ABP: --  ABP(mean): --  RR: 16 (27 Jun 2021 13:33) (16 - 18)  SpO2: 100% (27 Jun 2021 13:33) (99% - 100%)        CAPILLARY BLOOD GLUCOSE      POCT Blood Glucose.: 122 mg/dL (27 Jun 2021 13:03)      PHYSICAL EXAM:    Constitutional: well-developed; well-groomed; well-nourished; no distress,  Eyes: PERRL; EOMI  ENMT: Normal oropharnxy, no oral lesions, no erythema, no exudates  Neck:  Supple; no JVD; normal thyroid gland  MSK/Back: normal shape; ROM intact; strength intact. Normal strength in all ext, No joint swelling, no joint tenderenss, no joint erythema, no effusions  Respiratory: airway patent; breath sounds equal; good air movement, no wheezing, no crackles, no rhonchi. no increase in WOB  Cardiovascular: regular rate and rhythm  no rub , no murmur, no gallops.   Gastrointestinal: soft; no distention, normal BS, no TTP, no organomegaly, no ascites.  Genitourinary:  Extremities: no clubbing; no cyanosis; no pedal edema, non-tender to palpation, DP and Radial pulses intact.  Neurological: alert and oriented x 3; responds to pain; responds to verbal commands; sensation intact: CN nerves grossly intact.   Skin: warm and dry; color normal: no rash: no ulcers  Psychiatric: Calm, no SI/HI        LINES:     HOSPITAL MEDICATIONS:  Standing Meds:  dextrose 40% Gel 15 Gram(s) Oral once  dextrose 5%. 1000 milliLiter(s) IV Continuous <Continuous>  dextrose 5%. 1000 milliLiter(s) IV Continuous <Continuous>  dextrose 50% Injectable 25 Gram(s) IV Push once  dextrose 50% Injectable 12.5 Gram(s) IV Push once  dextrose 50% Injectable 25 Gram(s) IV Push once  glucagon  Injectable 1 milliGRAM(s) IntraMuscular once  insulin lispro (ADMELOG) corrective regimen sliding scale   SubCutaneous three times a day before meals  insulin lispro (ADMELOG) corrective regimen sliding scale   SubCutaneous at bedtime  losartan 100 milliGRAM(s) Oral daily  multivitamin 1 Tablet(s) Oral daily      PRN Meds:      LABS:                        12.8   5.85  )-----------( 232      ( 26 Jun 2021 21:50 )             39.0     Hgb Trend: 12.8<--  06-26    141  |  105  |  14  ----------------------------<  98  4.2   |  23  |  0.72    Ca    9.6      26 Jun 2021 21:50    TPro  7.5  /  Alb  4.5  /  TBili  0.4  /  DBili  x   /  AST  19  /  ALT  28  /  AlkPhos  46  06-26    Creatinine Trend: 0.72<--            MICROBIOLOGY:       RADIOLOGY:  [ ] Reviewed and interpreted by me    PULMONARY FUNCTION TESTS:    EKG:

## 2021-06-27 NOTE — CONSULT NOTE ADULT - ASSESSMENT
55 f originally from [place], came to [place] ago and was treated for latent TB? was treated for a presumed pneumonia 12/2020 but still with T chest discomfort, no fever, hemoptysis, night sweats, weight loss, a CT was done as outpatient 6/21/21 which showed posterior upper lobe opacity s/o a mass and pt was sent to hospital by her pulmonary  afebrile, normal WBC  CXR: clear    posterior RUL mass on outpatient CT  pt from [place] with treated latent TB, no fever, hemoptysis, night sweat, weight loss    * f/u the quantiferon  * pulm eval    The above assessment and plan was discussed with the primary team    Guera Antony MD  Pager [phone]  After 5pm and on weekends call [phone] 55 f originally from [place], came to [place] years ago and was treated for latent TB? (she is not sure but she was not told that she had TB), in December 2020, she had a sudden R chest pain with no fever or cough, there was something on imaging so she was treated for a presumed pneumonia was treated for a presumed pneumonia, repeat CT was done which was unchanged and then a PET was done 6/21/21 which showed heterogenous low grade uptake of a posterior RUL solid component of the mixed opacity, differentials considered sequela of a previous infection like TB or malignancy  afebrile, normal WBC  CXR: clear    posterior RUL mass on outpatient CT and PET with low grade up take   pt from [place] with treated latent TB, no fever, hemoptysis, night sweat, weight loss    * f/u the quantiferon  * pulm eval for bronc and biopsy  * would continue with airborne for now but low suspicion for active TB now  * AFB sputum x 3 if patient can provide (she stated no cough at all)  * monitor off antibiotics      The above assessment and plan was discussed with the primary team    Guera Antony MD  Pager [phone]  After 5pm and on weekends call [phone]

## 2021-06-27 NOTE — PROGRESS NOTE ADULT - PROBLEM SELECTOR PLAN 1
-low suspicion for acute ir active infection  -QuantiFeron, afb ordered  -pulm to be called, will likely require bronchoscopy -low suspicion for acute ir active infection  - f/u QuantiFeron, afb   - f/u Chest Xray   - f/u with ID recs

## 2021-06-27 NOTE — PROGRESS NOTE ADULT - SUBJECTIVE AND OBJECTIVE BOX
MD AMARJIT Khan/MERRY [id number]      PROGRESS NOTE:     Patient is a 55y old  Female who presents with a chief complaint of lung mass eval (27 Jun 2021 01:40)      SUBJECTIVE / OVERNIGHT EVENTS:    No acute events overnight. Patient examined at bedside with no acute complaints.     Denies SOB, chest pain, nausea, vomiting, diarrhea, constipation.    MEDICATIONS  (STANDING):  dextrose 40% Gel 15 Gram(s) Oral once  dextrose 5%. 1000 milliLiter(s) (50 mL/Hr) IV Continuous <Continuous>  dextrose 5%. 1000 milliLiter(s) (100 mL/Hr) IV Continuous <Continuous>  dextrose 50% Injectable 25 Gram(s) IV Push once  dextrose 50% Injectable 12.5 Gram(s) IV Push once  dextrose 50% Injectable 25 Gram(s) IV Push once  glucagon  Injectable 1 milliGRAM(s) IntraMuscular once  insulin lispro (ADMELOG) corrective regimen sliding scale   SubCutaneous three times a day before meals  insulin lispro (ADMELOG) corrective regimen sliding scale   SubCutaneous at bedtime  losartan 100 milliGRAM(s) Oral daily  multivitamin 1 Tablet(s) Oral daily    MEDICATIONS  (PRN):      CAPILLARY BLOOD GLUCOSE      POCT Blood Glucose.: 120 mg/dL (26 Jun 2021 19:16)    I&O's Summary      PHYSICAL EXAM:  Vital Signs Last 24 Hrs  T(C): 37.1 (27 Jun 2021 06:25), Max: 37.1 (27 Jun 2021 06:25)  T(F): 98.7 (27 Jun 2021 06:25), Max: 98.7 (27 Jun 2021 06:25)  HR: 80 (27 Jun 2021 06:25) (70 - 92)  BP: 139/85 (27 Jun 2021 06:25) (139/85 - 172/90)  BP(mean): --  RR: 17 (27 Jun 2021 06:25) (17 - 18)  SpO2: 99% (27 Jun 2021 06:25) (99% - 100%)    CONSTITUTIONAL: NAD, well-developed  RESPIRATORY: Normal respiratory effort; lungs are clear to auscultation bilaterally  CARDIOVASCULAR: Regular rate and rhythm, normal S1 and S2, no murmur/rub/gallop; No lower extremity edema; Peripheral pulses are 2+ bilaterally  ABDOMEN: Nontender to palpation, normoactive bowel sounds, no rebound/guarding; No hepatosplenomegaly  MUSCLOSKELETAL: no clubbing or cyanosis of digits; no joint swelling or tenderness to palpation  PSYCH: A+O to person, place, and time; affect appropriate    LABS:                        12.8   5.85  )-----------( 232      ( 26 Jun 2021 21:50 )             39.0     06-26    141  |  105  |  14  ----------------------------<  98  4.2   |  23  |  0.72    Ca    9.6      26 Jun 2021 21:50    TPro  7.5  /  Alb  4.5  /  TBili  0.4  /  DBili  x   /  AST  19  /  ALT  28  /  AlkPhos  46  06-26                RADIOLOGY & ADDITIONAL TESTS:  Results Reviewed:   Imaging Personally Reviewed:  Electrocardiogram Personally Reviewed:    COORDINATION OF CARE:  Care Discussed with Consultants/Other Providers [Y/N]:  Prior or Outpatient Records Reviewed [Y/N]:   MD AMARJIT Khan/MERRY [id number]      PROGRESS NOTE:     Patient is a 55y old  Female who presents with a chief complaint of lung mass eval (27 Jun 2021 01:40)      SUBJECTIVE / OVERNIGHT EVENTS:    No acute events overnight. Patient examined at bedside with no acute complaints.     Denies fever, chills. hemoptysis, SOB, chest pain, nausea, vomiting, diarrhea, constipation.    MEDICATIONS  (STANDING):  dextrose 40% Gel 15 Gram(s) Oral once  dextrose 5%. 1000 milliLiter(s) (50 mL/Hr) IV Continuous <Continuous>  dextrose 5%. 1000 milliLiter(s) (100 mL/Hr) IV Continuous <Continuous>  dextrose 50% Injectable 25 Gram(s) IV Push once  dextrose 50% Injectable 12.5 Gram(s) IV Push once  dextrose 50% Injectable 25 Gram(s) IV Push once  glucagon  Injectable 1 milliGRAM(s) IntraMuscular once  insulin lispro (ADMELOG) corrective regimen sliding scale   SubCutaneous three times a day before meals  insulin lispro (ADMELOG) corrective regimen sliding scale   SubCutaneous at bedtime  losartan 100 milliGRAM(s) Oral daily  multivitamin 1 Tablet(s) Oral daily    MEDICATIONS  (PRN):      CAPILLARY BLOOD GLUCOSE      POCT Blood Glucose.: 120 mg/dL (26 Jun 2021 19:16)    I&O's Summary      PHYSICAL EXAM:  Vital Signs Last 24 Hrs  T(C): 37.1 (27 Jun 2021 06:25), Max: 37.1 (27 Jun 2021 06:25)  T(F): 98.7 (27 Jun 2021 06:25), Max: 98.7 (27 Jun 2021 06:25)  HR: 80 (27 Jun 2021 06:25) (70 - 92)  BP: 139/85 (27 Jun 2021 06:25) (139/85 - 172/90)  BP(mean): --  RR: 17 (27 Jun 2021 06:25) (17 - 18)  SpO2: 99% (27 Jun 2021 06:25) (99% - 100%)    CONSTITUTIONAL: NAD, well-developed  RESPIRATORY: Normal respiratory effort; lungs are clear to auscultation bilaterally  CARDIOVASCULAR: Regular rate and rhythm, normal S1 and S2, no murmur/rub/gallop; No lower extremity edema; Peripheral pulses are 2+ bilaterally  ABDOMEN: Nontender to palpation, normoactive bowel sounds, no rebound/guarding; No hepatosplenomegaly  MUSCLOSKELETAL: no clubbing or cyanosis of digits; no joint swelling or tenderness to palpation  PSYCH: A+O to person, place, and time; affect appropriate    LABS:                        12.8   5.85  )-----------( 232      ( 26 Jun 2021 21:50 )             39.0     06-26    141  |  105  |  14  ----------------------------<  98  4.2   |  23  |  0.72    Ca    9.6      26 Jun 2021 21:50    TPro  7.5  /  Alb  4.5  /  TBili  0.4  /  DBili  x   /  AST  19  /  ALT  28  /  AlkPhos  46  06-26                RADIOLOGY & ADDITIONAL TESTS:  Results Reviewed:   Imaging Personally Reviewed:  Electrocardiogram Personally Reviewed:    COORDINATION OF CARE:  Care Discussed with Consultants/Other Providers [Y/N]:  Prior or Outpatient Records Reviewed [Y/N]:

## 2021-06-27 NOTE — CONSULT NOTE ADULT - SUBJECTIVE AND OBJECTIVE BOX
HPI:  54 yo f originally from [place], been in [place], last in [place] 2007. H/o dm2, htn. Pt reports being treated for PNA in December but still with perisstemt right upper chest discomfort. Underwent recent CT 6/21 (report in physical chart) demonstrating posterior right uppe lobe opacity. Pt denies fevers, cough, sob (including December when presumptively diagnosed with PNA). Denies weight loss, hemoptysis, night sweats. Reports taking prolonged course  TB med when first arriving from [facility] 30 years ago but was asymptomatic. Denies h/o smoking or 2nd hand exposure    Sent in by pulmonologist Dr Carrillo for further evaluation of lung mass (27 Jun 2021 01:40)      PAST MEDICAL & SURGICAL HISTORY:  Type 2 diabetes mellitus    Hypertension    No pertinent past medical history    No significant past surgical history    No significant past surgical history        Allergies    No Known Allergies    Intolerances        ANTIMICROBIALS:      OTHER MEDS:  dextrose 40% Gel 15 Gram(s) Oral once  dextrose 5%. 1000 milliLiter(s) IV Continuous <Continuous>  dextrose 5%. 1000 milliLiter(s) IV Continuous <Continuous>  dextrose 50% Injectable 25 Gram(s) IV Push once  dextrose 50% Injectable 12.5 Gram(s) IV Push once  dextrose 50% Injectable 25 Gram(s) IV Push once  glucagon  Injectable 1 milliGRAM(s) IntraMuscular once  insulin lispro (ADMELOG) corrective regimen sliding scale   SubCutaneous three times a day before meals  insulin lispro (ADMELOG) corrective regimen sliding scale   SubCutaneous at bedtime  losartan 100 milliGRAM(s) Oral daily  multivitamin 1 Tablet(s) Oral daily      SOCIAL HISTORY:  from [facility], came here 30 years ago, last trip to [facility] 2007  no smoking, alcohol or drug abuse  no recent travel    FAMILY HISTORY:  Family history of type 2 diabetes mellitus (Father)    Family history of type 2 diabetes mellitus (Mother)        ROS:    All other systems negative     Constitutional: no fever, no chills, no weight loss, no night sweats  Eye: no eye pain, no redness, no vision changes  ENT:  no sore throat, no rhinorrhea  Cardiovascular:  no chest pain, no palpitation  Respiratory:  no SOB, no cough  GI:  no abd pain, no vomiting, no diarrhea  urinary: no dysuria, no hematuria, no flank pain  : no vaginal discharge or bleeding  musculoskeletal:  no joint pain, no joint swelling  skin:  no rash  neurology:  no headache, no seizure, no change in mental status  psych: no anxiety, no depression     Physical Exam:    General:    NAD, non toxic  Head: atraumatic, normocephalic  Eyes: normal sclera and conjunctiva  ENT:   no oropharyngeal lesions, no LAD, neck supple  Cardio:    regular S1,S2, no murmur  Respiratory:   clear b/l, no wheezing  abd:   soft, BS +, not tender  :     no CVAT, no suprapubic tenderness, no beckman  Musculoskeletal : no joint swelling, no edema  Skin:    no rash  vascular: no phlebitis  Neurologic:     no focal deficits  psych: normal affect      Drug Dosing Weight  Height (cm): 154.9 (26 Jun 2021 23:20)  Weight (kg): 66.3 (26 Jun 2021 23:20)  BMI (kg/m2): 27.6 (26 Jun 2021 23:20)  BSA (m2): 1.65 (26 Jun 2021 23:20)    Vital Signs Last 24 Hrs  T(F): 98.7 (06-27-21 @ 06:25), Max: 98.7 (06-27-21 @ 06:25)    Vital Signs Last 24 Hrs  HR: 80 (06-27-21 @ 06:25) (70 - 92)  BP: 139/85 (06-27-21 @ 06:25) (139/85 - 172/90)  RR: 17 (06-27-21 @ 06:25)  SpO2: 99% (06-27-21 @ 06:25) (99% - 100%)  Wt(kg): --                          12.8   5.85  )-----------( 232      ( 26 Jun 2021 21:50 )             39.0       06-26    141  |  105  |  14  ----------------------------<  98  4.2   |  23  |  0.72    Ca    9.6      26 Jun 2021 21:50    TPro  7.5  /  Alb  4.5  /  TBili  0.4  /  DBili  x   /  AST  19  /  ALT  28  /  AlkPhos  46  06-26          MICROBIOLOGY:  v              RADIOLOGY:    Images independently visualized and reviewed personally,  findings as below  < from: Xray Chest 1 View- PORTABLE-Urgent (Xray Chest 1 View- PORTABLE-Urgent .) (06.26.21 @ 20:45) >  IMPRESSION:  Clear lungs.      < end of copied text >

## 2021-06-27 NOTE — PROGRESS NOTE ADULT - PROBLEM SELECTOR PLAN 2
incidental sigmoid colon thickening picked up on CT; will require colonoscopy if not yet performed incidental sigmoid colon thickening picked up on CT; will require colonoscopy if not yet performed  - f/u colonoscopy outpatient

## 2021-06-27 NOTE — H&P ADULT - HISTORY OF PRESENT ILLNESS
54 yo f originally from [place], been in [place], last in [place] 2007. H/o dm2, htn. Pt reports being treated for PNA in December but still with perisstemt right upper chest discomfort. Underwent recent CT 6/21 (report in physical chart) demonstrating posterior right uppe lobe opacity. Pt denies fevers, cough, sob (including December when presumptively diagnosed with PNA). Denies weigh loss, hemoptysis, night sweats. Sent in by pulmonologist Dr Carrillo for further evaluation of lung mass 56 yo f originally from [place], been in [place], last in [facility] 2007. H/o dm2, htn. Pt reports being treated for PNA in December but still with perisstemt right upper chest discomfort. Underwent recent CT 6/21 (report in physical chart) demonstrating posterior right uppe lobe opacity. Pt denies fevers, cough, sob (including December when presumptively diagnosed with PNA). Denies weight loss, hemoptysis, night sweats. Reports taking prolonged course  TB med when first arriving from [facility] 30 years ago but was asymptomatic. Denies h/o smoking or 2nd hand exposure    Sent in by pulmonologist Dr Carrillo for further evaluation of lung mass

## 2021-06-27 NOTE — PROGRESS NOTE ADULT - ASSESSMENT
54 yo f with right lung mass  54 yo f with [facility] of TB (many years ago) treated, pre-DM, HTN, recently treated for PNA in december who is referred to hospital from pulmonologist for persistent changes on CT concerning for Tb vs neoplasm. Pt is stable with no acute symptoms (hemoptysis, fever/chills).

## 2021-06-27 NOTE — H&P ADULT - NSICDXPASTMEDICALHX_GEN_ALL_CORE_FT
PAST MEDICAL HISTORY:  Hypertension     No pertinent past medical history     Type 2 diabetes mellitus

## 2021-06-27 NOTE — H&P ADULT - NSICDXFAMILYHX_GEN_ALL_CORE_FT
FAMILY HISTORY:  Father  Still living? Yes, Estimated age: 81-90  Family history of type 2 diabetes mellitus, Age at diagnosis: Age Unknown    Mother  Still living? Yes, Estimated age: 81-90  Family history of type 2 diabetes mellitus, Age at diagnosis: Age Unknown

## 2021-06-27 NOTE — H&P ADULT - PROBLEM SELECTOR PLAN 1
-low suspicion for acute ir active infection  -QuantiFeron, afb ordered  -pulm to be called, will likely require bronchoscopy

## 2021-06-27 NOTE — PATIENT PROFILE ADULT - MONEY FOR FOOD
Due to COVID-19 ACTION PLAN, the patient's office visit was converted to a video visit.    Patient is aware this visit will be treated as an office visit and is billable for insurance purposes if applicable. To accommodate patient care during the COVID-19 pandemic, this visit was performed using Zoom video techonology.     This visit was performed via live interactive two-way video.    Clinician Location: Home Office  Patient Location: Home    This patient verbally consents to a video visit and the presence of a scribe.     Patient states they are Iza Davidson and that they are speaking to me from their home.     The patient's last video visit was on 10/20/2020.    Iza Davidson is a 60 year old female. She notes the following changes in medical history since last visit:   Chief Complaint   Patient presents with   • Hypertension   • CHF   • Video Visit   • Diabetes     147 mg/dL, post-prandial        HPI  Iza Davidson is a 60 year old female with a past medical history of Asthma, CONCETTA, HTN, HF, Afib, OA of Both Knees, DM2, and Hair Thinning that presents for a current evaluation of chronic conditions.    In regards to her UTI, the patient states that she has been taking her Bactrim -160 mg PO BID for 7 days and denies urinary frequency. UTI noted from urine culture >100,000 CFU/mL Klebsiella Pneumoniae from 11/2/2020 labs.    In regards to her DM, the patient states that she is not taking any medications. The patient states that Emil, her PharmD, states that the patient's A1c is doing well so she should manage with her diet. The patient states that her vitamins that she takes are chewable and have a lot of sugar which can elevate her blood glucose. Glucose is 147 mg/dL today, per patient.    In regards to her Vitamin D Deficiency, the patient states that she is taking Vitamin D3 2000 Units chewable gummies.    The patient states that she is not seeing the Nephrologist. Urinalysis labs from 11/2/2020  showed 30 mg/dL protein in urine. The patient states that she drinks adequate water daily.    Reviewed and discussed XR Shoulder 3 Views Right from 2020 showing: Mild degenerative change of the right shoulder.    Reviewed and discussed XR Hand Min 3 Views Left from 2020 showing: Degenerative changes of the left hand, moderate at the 1st MCP joint.    Reviewed and discussed labs from 2020 showing: Culture >100,000 CFU/mL Klebsiella Pneumoniae, HDL 48 mg/dL, Moderate blood urinalysis, 30 mg/dL protein (urine), positive Nitrite, Large leukocyte esterase, few bacteria in urine, A1c 7.0%, Microalbumin/creatinine 38.7 mg/g, Glucose 121 mg/dL, Creatinine 0.97 mg/dL, GFR 74, Albumin 3.2 g/dL, A/G Ratio 0.9, HGB 11.9 g/dL, MCHC 29.8 g/dL, and otherwise unremarkable results.    The patient denies any CP, SOB, or COVID-19 symptoms.      PAST MEDICAL HISTORY:   Past Medical History:   Diagnosis Date   • Acute exacerbation of CHF (congestive heart failure) (CMS/HCC) 2019   • Acute URI 2020   • Acute UTI 2020   • Bilateral lower extremity edema 2019   • Congestive cardiac failure (CMS/[facility])    • Essential (primary) hypertension    • Hematuria 2020   • Moderate persistent asthma with exacerbation 10/30/2019   • RAD (reactive airway disease)        PAST SURGICAL HISTORY:   Past Surgical History:   Procedure Laterality Date   •  section, low transverse     • Kidney stone surgery     • Tubal ligation         FAMILY HISTORY:   Family History   Problem Relation Age of Onset   • Cancer, Breast Mother 82   • Cancer, Ovarian Neg Hx    • Cancer, Colon Neg Hx    • Cancer, Endometrial Neg Hx        SOCIAL HISTORY:   Social History     Tobacco Use   • Smoking status: Never Smoker   • Smokeless tobacco: Never Used   Substance Use Topics   • Alcohol use: Yes     Frequency: Never     Comment: Social   • Drug use: No       Drug Use:    No                ALLERGIES:   ALLERGIES:  No Known  Allergies    MEDICATIONS:  Medication and allergy reconciliation completed over the phone.    Current Outpatient Medications   Medication Sig Dispense Refill   • sulfamethoxazole-trimethoprim (Bactrim DS) 800-160 MG per tablet Take 1 tablet by mouth 2 times daily for 7 days. 14 tablet 0   • rivaroxaban (Xarelto) 20 MG Tab Take 1 tablet by mouth daily (with breakfast). Blood thinner to prevent stroke. Must be taken with food. 90 tablet 1   • metoPROLOL succinate (TOPROL-XL) 100 MG 24 hr tablet TAKE 1 TABLET BY MOUTH DAILY FOR HEART 90 tablet 1   • traMADol (ULTRAM) 50 MG tablet Take 1 tablet by mouth daily as needed for Pain. 30 tablet 0   • magnesium oxide (MAG-OX) 400 (240 Mg) MG tablet Take 1 tablet by mouth daily. For low magnesium 90 tablet 0   • spironolactone (ALDACTONE) 25 MG tablet TAKE 1 TABLET BY MOUTH DAILY FOR HEART 90 tablet 0   • digoxin (LANOXIN) 0.125 MG tablet Take 1 tablet by mouth daily. For heart rhythm 90 tablet 10   • bumetanide (BUMEX) 1 MG tablet TAKE (1) TABLET BY MOUTH EVERY MORNING AND TAKE AN EXTRA TABLET IN THE AFTERNOON AS NEEDED IF SWELLING/WORSENING SHORTNESS OF BREATH 180 tablet 10   • SYMBICORT 160-4.5 MCG/ACT inhaler Inhale 2 puffs into the lungs 2 times daily.      • losartan (COZAAR) 25 MG tablet Take 1 tablet by mouth daily. For blood pressure and heart. 90 tablet 1   • albuterol-ipratropium 2.5 mg/0.5 mg (DUONEB) 0.5-2.5 (3) MG/3ML nebulizer solution Use 1 vial in the nebulizer every 4-6 hours as needed for shortness of breath or wheezing 360 mL 1   • albuterol (PROAIR HFA) 108 (90 Base) MCG/ACT inhaler INHALE 2 PUFFS BY MOUTH EVERY 4 TO 6 HOURS AS NEEDED 1 Inhaler 5   • OneTouch Delica Lancets 30G Misc Inject 1 Units into the skin daily. Check blood sugar up to 2 times daily as directed. 100 each 1   • atorvastatin (LIPITOR) 10 MG tablet Take 1 tablet po at BEDTIME on MONDAYS, WEDNESDAYS, FRIDAYS, and SATURDAYS only. 90 tablet 1   • Cholecalciferol (Vitamin D3) 50 mcg (2,000  units) capsule Take 1 capsule by mouth daily. 30 capsule 5   • Menthol, Topical Analgesic, (BIOFREEZE) 4 % Gel As needed for arthritis     • blood glucose test strip Test blood sugar up to 2 times daily as directed. Diagnosis: type 2 diabetes, meter one touch veriod 100 strip 5   • fexofenadine (ALLEGRA) 180 MG tablet Take 1 tablet by mouth daily. (Patient taking differently: Take 180 mg by mouth daily as needed. ) 30 tablet 5   • Blood Pressure Monitoring (BLOOD PRESSURE MONITOR/L CUFF) Misc Indications: High Blood Pressure Disorder Use LARGE upper arm cuff to check blood pressure daily. 1 each 0     No current facility-administered medications for this visit.          Review of Systems   Constitutional: Negative for chills and fever.   Respiratory: Negative for shortness of breath.    Cardiovascular: Negative for chest pain.   Gastrointestinal: Negative for diarrhea, nausea and vomiting.          Physical Exam  Constitutional:       Appearance: She is well-developed.   HENT:      Head: Normocephalic and atraumatic.   Eyes:      Comments: Eyes appear equal, round, and reactive to light   Neck:      Musculoskeletal: Normal range of motion.   Pulmonary:      Effort: No respiratory distress.   Musculoskeletal: Normal range of motion.   Skin:     Coloration: Skin is not pale.   Neurological:      Mental Status: She is alert and oriented to person, place, and time.   Psychiatric:         Behavior: Behavior normal.         Thought Content: Thought content normal.         Judgment: Judgment normal.          Labs/Results:    XR Shoulder 3 Views Right from 11/4/2020  IMPRESSION  Mild degenerative change of the right shoulder.    XR Hand Min 3 Views Left from 11/4/2020  IMPRESSION  Degenerative changes of the left hand, moderate at the 1st MCP joint.    Component      Latest Ref Rng & Units 11/2/2020   Specimen Description       URINE, CLEAN CATCH/MIDSTREAM   CULTURE       >100,000 CFU/mL KLEBSIELLA PNEUMONIAE (P)   REPORT  STATUS       11/05/2020 FINAL   ORGANISM       KLEBSIELLA PNEUMONIAE   WBC      4.2 - 11.0 K/mcL 6.1   RBC      4.00 - 5.20 mil/mcL 4.22   HGB      12.0 - 15.5 g/dL 11.9 (L)   HCT      36.0 - 46.5 % 40.0   MCV      78.0 - 100.0 fl 94.8   MCH      26.0 - 34.0 pg 28.2   MCHC      32.0 - 36.5 g/dL 29.8 (L)   RDW-CV      11.0 - 15.0 % 14.7   PLT      140 - 450 K/mcL 277   NRBC      0 /100 WBC 0   DIFFERENTIAL TYPE       AUTOMATED DIFFERENTIAL   Neutrophil      % 55   LYMPH      % 26   MONO      % 13   EOSIN      % 4   BASO      % 1   Percent Immature Granuloctyes      % 1   Absolute Neutrophil      1.8 - 7.7 K/mcL 3.5   Absolute Lymph      1.0 - 4.0 K/mcL 1.6   Absolute Mono      0.3 - 0.9 K/mcL 0.8   Absolute Eos      0.1 - 0.5 K/mcL 0.2   Absolute Baso      0.0 - 0.3 K/mcL 0.0   Absolute Immature Granulocytes      0 - 0.2 K/mcl 0.0   Fasting Status      hrs UNKNOWN   Sodium      135 - 145 mmol/L 142   Potassium      3.4 - 5.1 mmol/L 4.4   Chloride      98 - 107 mmol/L 107   CO2      21 - 32 mmol/L 29   ANION GAP      10 - 20 mmol/L 10   Glucose      65 - 99 mg/dL 121 (H)   BUN      6 - 20 mg/dL 14   Creatinine      0.51 - 0.95 mg/dL 0.97 (H)   GFR Estimate,        74   GFR Estimate, Non African American       64   BUN/CREATININE RATIO      7 - 25 14   CALCIUM      8.4 - 10.2 mg/dL 9.3   TOTAL BILIRUBIN      0.2 - 1.0 mg/dL 0.5   AST/SGOT      <38 Units/L 18   ALT/SGPT      <64 Units/L 19   ALK PHOSPHATASE      45 - 117 Units/L 78   TOTAL PROTEIN      6.4 - 8.2 g/dL 6.9   Albumin      3.6 - 5.1 g/dL 3.2 (L)   GLOBULIN      2.0 - 4.0 g/dL 3.7   A/G Ratio, Serum      1.0 - 2.4 0.9 (L)   COLOR      YELLOW YELLOW   CLARITY       CLOUDY   GLUCOSE(URINE)      NEGATIVE mg/dL NEGATIVE   BILIRUBIN      NEGATIVE NEGATIVE   KETONES      NEGATIVE mg/dL NEGATIVE   SPECIFIC GRAVITY      1.005 - 1.030 1.015   BLOOD      NEGATIVE MODERATE (A)   pH      5.0 - 7.0 Units 5.0   PROTEIN(URINE)      NEGATIVE mg/dL 30 (A)    UROBILINOGEN      0.0 - 1.0 mg/dL 0.2   NITRITE      NEGATIVE POSITIVE (A)   LEUKOCYTE ESTERASE      NEGATIVE LARGE (A)   URINE TYPE       URINE, CLEAN CATCH/MIDSTREAM   Squamous EPI'S      0 - 5 /hpf 1 to 5   RBC      0 - 2 /hpf 6 to 10   WBC      0 - 5 /hpf >100   Bacteria      NONE SEEN /hpf FEW (A)   Hyaline Casts      0 - 5 /lpf NONE SEEN   MUCOUS       PRESENT   FASTING STATUS      hrs UNKNOWN   CHOLESTEROL      <200 mg/dL 162   CALCULATED LDL      <130 mg/dL 89   HDL      >49 mg/dL 48 (L)   TRIGLYCERIDE      <150 mg/dL 127   CALCULATED NON HDL      mg/dL 114   CHOL/HDL      <4.5 3.4   MICROALBUMIN,UA (TTL)      mg/dL 7.43   CREATININE, URINE (TOTAL)      mg/dL 192.00   MICROALBUMIN/CREAT      <30 mg/g 38.7 (H)   GLYCOHEMOGLOBIN A1C      4.5 - 5.6 % 7.0 (H)   VITAMIND, 25 HYDROXY      30.0 - 100.0 ng/mL 54.0   TSH      0.350 - 5.000 mcUnits/mL 3.191       Assessment   Morbid obesity with BMI of 60.0-69.9, adult (CMS/[facility])  - SERVICE TO NUTRITION COUNSELING    Controlled type 2 diabetes mellitus (CMS/[facility])    Diabetes mellitus with coincident hypertension (CMS/[facility])    Hyperlipidemia associated with type 2 diabetes mellitus (CMS/[facility])    Hypertensive heart disease with HF (heart failure) (CMS/[facility])    Diastolic heart failure (CMS/[facility])    DM (diabetes mellitus), type 2, uncontrolled (CMS/[facility])  - OneTouch Delica Lancets 30G Misc; Inject 1 Units into the skin daily. Check blood sugar up to 2 times daily as directed.  Dispense: 100 each; Refill: 1    CONCETTA on CPAP    Obesity with alveolar hypoventilation and body mass index (BMI) of 40 or greater (CMS/[facility])    Moderate persistent asthma without complication    Paroxysmal atrial fibrillation (CMS/[facility])    Persistent microalbuminuria due to type 2 diabetes mellitus (CMS/[facility])  - SERVICE TO NEPHROLOGY    Sarcoidosis    Combined hyperlipidemia associated with type 2 diabetes mellitus (CMS/[facility])  - atorvastatin (LIPITOR) 10 MG tablet; Take 1 tablet po at BEDTIME on MONDAYS,  WEDNESDAYS, FRIDAYS, and SATURDAYS only.  Dispense: 90 tablet; Refill: 1    Diabetes mellitus type 2, diet-controlled (CMS/[facility])  - SERVICE TO NUTRITION COUNSELING  - SERVICE TO NEPHROLOGY    Vitamin D deficiency  - Cholecalciferol (Vitamin D3) 50 mcg (2,000 units) capsule; Take 1 capsule by mouth daily.  Dispense: 30 capsule; Refill: 5      Assessment/Plan:    Morbid obesity with BMI of 60.0-69.9, adult/Obesity with alveolar hypoventilation and body mass index (BMI) of 40 or greater  - Referral to Nutrition Counseling consult generated.  - Weight loss and diet discussed  - Encouraged patient to adhere to healthy diet rich in fresh fruits and vegetables.  - Encouraged patient to exercise regularly.    Controlled type 2 diabetes mellitus/DM (diabetes mellitus), type 2, uncontrolled/Diabetes mellitus type 2, diet-controlled/Diabetes mellitus with coincident hypertension  - Referral to Nutrition Counseling consult generated.  - Referral to Nephrology consult generated.  - Continue OneTouch Delica Lancets 30G Misc as directed. Refilled.  - Encouraged patient to adhere to healthy diet rich in fresh fruits and vegetables.  - Encouraged patient to exercise regularly.  Hemoglobin A1C (%)   Date Value   11/02/2020 7.0 (H)       Hypertensive heart disease with HF (heart failure)/Diastolic heart failure/Paroxysmal atrial fibrillation  - Continue Digoxin 0.125 mg PO QD  - Continue Xarelto 20 MG PO QD.   - Defer to Cardiology Consult  - Defer to CHF Consult     CONCETTA on CPAP  - Stable. CPM.  - Defer to Sleep Medicine consult.  - Defer to Pulmonary Medicine consult.    Moderate persistent asthma without complication/Sarcoidosis  - Continue Albuterol 108 MCG/ACT; Inhale 2 puffs Q 4-6 PRN for SOB/Wheezing  - Continue Albuterol Albuterol-Ipratropium 2.5 mg/0.5 mg Nebulizer Solution 3 mL As Directed  - Defer to Pulmonary Medicine Consult     Persistent microalbuminuria due to type 2 diabetes mellitus   - Referral to Nephrology consult  generated.  - Will monitor.    Combined hyperlipidemia associated with type 2 diabetes mellitus/Hyperlipidemia associated with type 2 diabetes mellitus  - START Atorvastatin 10 mg PO QHS as directed. Discussed potential side effects.  - Weight loss and diet discussed  - Encouraged patient to adhere to diet rich in healthy, fresh fruits and vegetables.  CALCULATED LDL (mg/dL)   Date Value   11/02/2020 89       Vitamin D deficiency  - START Cholecalciferol 50 mcg PO QD.  - Advised patient to avoid gummy Vitamins with extra sugar.  - Encouraged patient to   Cholecalciferol (Vitamin D3) 50 mcg (2,000 units) capsule; Take 1 capsule by mouth daily.  Dispense: 30 capsule; Refill: 5    Routine Health Maintenance  - Mammogram-- MA benign 7/23/2020.  - Pap Smear-- Performed 12/4/2019  - Colonoscopy-- Completed 9/23/2019 with polyp; due next 9/23/2022  - Ophthalmology-- Referral given 6/23/2020.  - Immunizations-- Pneumonia and Shingrix shots UTD.        Counseled patient on universal precautions (proper handwashing, wearing a mask, disinfecting hard surfaces, etc.), the importance of self-quarantining, and ER precautions if symptoms worsen.     Orders Placed This Encounter   • SERVICE TO NUTRITION COUNSELING   • SERVICE TO NEPHROLOGY   • OneTouch Delica Lancets 30G Misc   • atorvastatin (LIPITOR) 10 MG tablet   • Cholecalciferol (Vitamin D3) 50 mcg (2,000 units) capsule       Return in about 3 months (around 2/12/2021) for Video Visit.     I spent a total of 25 minutes face-to-face with the patient. All of the patient's questions were answered.    Scribe Attestation: On 11/12/2020, ILaquita scribed the services personally performed by Sadie Alonzo MD.   Provider Attestation: The documentation recorded by the scribe accurately reflects the service I personally performed and the decisions made by me, Sadie Alonzo MD.      no

## 2021-06-27 NOTE — CONSULT NOTE ADULT - ASSESSMENT
54 yo F originally from [place], been in [place] years, last in [place] 2007. T2DM, HTN sent in by the patient's pulmonologist for persistent abnormal findings on CT chest.     # Abnromal CT of the chest  - Asymptomatic, no weight loss, B symptoms. No respiratroy complaints  - Send by outpatient pulm to r/o TB and possible bronch.   - Patient will need induction with hypertonic saline to check for AFB as asymptomatic.   - In order to procedure with flexible bronchoscopy especially for transbronchial biopsies will need physical CD of the patient's most recent CT chest. Please obtain CT from outpatient. Family is willing to  the CD. Patient would like to avoid another additional radiation for repeated CT scans.   - Will d/w Interventional pulmonology but will need CT for further planning.     Dae Hyeon Kim [id number]  Pulmonary Critical Care Fellow  Pager [phone]

## 2021-06-27 NOTE — H&P ADULT - NSHPREVIEWOFSYSTEMS_GEN_ALL_CORE
Review of Systems:   CONSTITUTIONAL: No fever, weight loss, or fatigue  EYES: No eye pain, visual disturbances, or discharge  ENMT:  No difficulty hearing, tinnitus, vertigo; No sinus or throat pain  NECK: No pain or stiffness  RESPIRATORY: No cough, wheezing, chills or hemoptysis; No shortness of breath  CARDIOVASCULAR: + chest wall pain, no palpitations, dizziness, or leg swelling  GASTROINTESTINAL: No abdominal or epigastric pain. No nausea, vomiting, or hematemesis; No diarrhea or constipation. No melena or hematochezia.  GENITOURINARY: No dysuria, frequency, hematuria, or incontinence  NEUROLOGICAL: No headaches, memory loss, loss of strength, numbness, or tremors  SKIN: No itching, burning, rashes, or lesions   LYMPH NODES: No enlarged glands  ENDOCRINE: No heat or cold intolerance; No hair loss  MUSCULOSKELETAL: No joint pain or swelling; No muscle, back, or extremity pain

## 2021-06-27 NOTE — CONSULT NOTE ADULT - ATTENDING COMMENTS
as above; seen on 6/27/21  patient here with abnormal ct chest findings, concerning for tuberculosis; she had latent tb, per her, but does not remember how long she took treatment for (was done in [place] in the 1990s)  PET ct chest showing low uptake, could represent old TB vs malignancy vs other process; however, we cannot see images as we do not have CD itself. Patient to try to get CT here; will try to access records at outside facility    in mean time, would get 3 sputum afb samples here (induced sputum)  no need for abx at this time, no need to treat for latent tb again given ruling out active tb  bronch pending imaging; if we cannot get imaging records, may need repeat CT chest here

## 2021-06-28 LAB
A1C WITH ESTIMATED AVERAGE GLUCOSE RESULT: 6.2 % — HIGH (ref 4–5.6)
ANION GAP SERPL CALC-SCNC: 17 MMOL/L — HIGH (ref 7–14)
BASOPHILS # BLD AUTO: 0.02 K/UL — SIGNIFICANT CHANGE UP (ref 0–0.2)
BASOPHILS NFR BLD AUTO: 0.4 % — SIGNIFICANT CHANGE UP (ref 0–2)
BUN SERPL-MCNC: 17 MG/DL — SIGNIFICANT CHANGE UP (ref 7–23)
CALCIUM SERPL-MCNC: 9.9 MG/DL — SIGNIFICANT CHANGE UP (ref 8.4–10.5)
CHLORIDE SERPL-SCNC: 105 MMOL/L — SIGNIFICANT CHANGE UP (ref 98–107)
CO2 SERPL-SCNC: 19 MMOL/L — LOW (ref 22–31)
CREAT SERPL-MCNC: 0.68 MG/DL — SIGNIFICANT CHANGE UP (ref 0.5–1.3)
EOSINOPHIL # BLD AUTO: 0.03 K/UL — SIGNIFICANT CHANGE UP (ref 0–0.5)
EOSINOPHIL NFR BLD AUTO: 0.6 % — SIGNIFICANT CHANGE UP (ref 0–6)
ESTIMATED AVERAGE GLUCOSE: 131 MG/DL — HIGH (ref 68–114)
GLUCOSE BLDC GLUCOMTR-MCNC: 105 MG/DL — HIGH (ref 70–99)
GLUCOSE BLDC GLUCOMTR-MCNC: 118 MG/DL — HIGH (ref 70–99)
GLUCOSE BLDC GLUCOMTR-MCNC: 119 MG/DL — HIGH (ref 70–99)
GLUCOSE BLDC GLUCOMTR-MCNC: 128 MG/DL — HIGH (ref 70–99)
GLUCOSE SERPL-MCNC: 132 MG/DL — HIGH (ref 70–99)
HCT VFR BLD CALC: 42.3 % — SIGNIFICANT CHANGE UP (ref 34.5–45)
HGB BLD-MCNC: 13.9 G/DL — SIGNIFICANT CHANGE UP (ref 11.5–15.5)
IANC: 2.41 K/UL — SIGNIFICANT CHANGE UP (ref 1.5–8.5)
IMM GRANULOCYTES NFR BLD AUTO: 0.2 % — SIGNIFICANT CHANGE UP (ref 0–1.5)
LYMPHOCYTES # BLD AUTO: 2.08 K/UL — SIGNIFICANT CHANGE UP (ref 1–3.3)
LYMPHOCYTES # BLD AUTO: 42.2 % — SIGNIFICANT CHANGE UP (ref 13–44)
MAGNESIUM SERPL-MCNC: 2 MG/DL — SIGNIFICANT CHANGE UP (ref 1.6–2.6)
MCHC RBC-ENTMCNC: 27.1 PG — SIGNIFICANT CHANGE UP (ref 27–34)
MCHC RBC-ENTMCNC: 32.9 GM/DL — SIGNIFICANT CHANGE UP (ref 32–36)
MCV RBC AUTO: 82.6 FL — SIGNIFICANT CHANGE UP (ref 80–100)
MONOCYTES # BLD AUTO: 0.38 K/UL — SIGNIFICANT CHANGE UP (ref 0–0.9)
MONOCYTES NFR BLD AUTO: 7.7 % — SIGNIFICANT CHANGE UP (ref 2–14)
NEUTROPHILS # BLD AUTO: 2.41 K/UL — SIGNIFICANT CHANGE UP (ref 1.8–7.4)
NEUTROPHILS NFR BLD AUTO: 48.9 % — SIGNIFICANT CHANGE UP (ref 43–77)
NRBC # BLD: 0 /100 WBCS — SIGNIFICANT CHANGE UP
NRBC # FLD: 0 K/UL — SIGNIFICANT CHANGE UP
PHOSPHATE SERPL-MCNC: 4.4 MG/DL — SIGNIFICANT CHANGE UP (ref 2.5–4.5)
PLATELET # BLD AUTO: 242 K/UL — SIGNIFICANT CHANGE UP (ref 150–400)
POTASSIUM SERPL-MCNC: 3.9 MMOL/L — SIGNIFICANT CHANGE UP (ref 3.5–5.3)
POTASSIUM SERPL-SCNC: 3.9 MMOL/L — SIGNIFICANT CHANGE UP (ref 3.5–5.3)
RBC # BLD: 5.12 M/UL — SIGNIFICANT CHANGE UP (ref 3.8–5.2)
RBC # FLD: 13.5 % — SIGNIFICANT CHANGE UP (ref 10.3–14.5)
SODIUM SERPL-SCNC: 141 MMOL/L — SIGNIFICANT CHANGE UP (ref 135–145)
WBC # BLD: 4.93 K/UL — SIGNIFICANT CHANGE UP (ref 3.8–10.5)
WBC # FLD AUTO: 4.93 K/UL — SIGNIFICANT CHANGE UP (ref 3.8–10.5)

## 2021-06-28 PROCEDURE — 99233 SBSQ HOSP IP/OBS HIGH 50: CPT | Mod: GC

## 2021-06-28 RX ORDER — SODIUM CHLORIDE 9 MG/ML
4 INJECTION INTRAMUSCULAR; INTRAVENOUS; SUBCUTANEOUS EVERY 6 HOURS
Refills: 0 | Status: DISCONTINUED | OUTPATIENT
Start: 2021-06-28 | End: 2021-06-29

## 2021-06-28 RX ADMIN — LOSARTAN POTASSIUM 100 MILLIGRAM(S): 100 TABLET, FILM COATED ORAL at 06:36

## 2021-06-28 RX ADMIN — Medication 1 TABLET(S): at 13:13

## 2021-06-28 NOTE — PROGRESS NOTE ADULT - PROBLEM SELECTOR PLAN 2
incidental sigmoid colon thickening picked up on CT; will require colonoscopy if not yet performed  - f/u colonoscopy outpatient

## 2021-06-28 NOTE — PROGRESS NOTE ADULT - SUBJECTIVE AND OBJECTIVE BOX
MD AMARJIT Khan/MERRY [id number]      PROGRESS NOTE:     Patient is a 55y old  Female who presents with a chief complaint of lung mass eval (27 Jun 2021 17:57)      SUBJECTIVE / OVERNIGHT EVENTS:    No acute events overnight. Patient examined at bedside with no acute complaints.     Denies SOB, chest pain, nausea, vomiting, diarrhea, constipation.    MEDICATIONS  (STANDING):  dextrose 40% Gel 15 Gram(s) Oral once  dextrose 5%. 1000 milliLiter(s) (50 mL/Hr) IV Continuous <Continuous>  dextrose 5%. 1000 milliLiter(s) (100 mL/Hr) IV Continuous <Continuous>  dextrose 50% Injectable 25 Gram(s) IV Push once  dextrose 50% Injectable 12.5 Gram(s) IV Push once  dextrose 50% Injectable 25 Gram(s) IV Push once  glucagon  Injectable 1 milliGRAM(s) IntraMuscular once  insulin lispro (ADMELOG) corrective regimen sliding scale   SubCutaneous three times a day before meals  insulin lispro (ADMELOG) corrective regimen sliding scale   SubCutaneous at bedtime  losartan 100 milliGRAM(s) Oral daily  multivitamin 1 Tablet(s) Oral daily    MEDICATIONS  (PRN):      CAPILLARY BLOOD GLUCOSE      POCT Blood Glucose.: 110 mg/dL (27 Jun 2021 22:21)  POCT Blood Glucose.: 149 mg/dL (27 Jun 2021 18:11)  POCT Blood Glucose.: 122 mg/dL (27 Jun 2021 13:03)  POCT Blood Glucose.: 116 mg/dL (27 Jun 2021 08:59)    I&O's Summary      PHYSICAL EXAM:  Vital Signs Last 24 Hrs  T(C): 36.6 (28 Jun 2021 06:33), Max: 37.2 (27 Jun 2021 13:33)  T(F): 97.8 (28 Jun 2021 06:33), Max: 98.9 (27 Jun 2021 13:33)  HR: 81 (28 Jun 2021 06:33) (81 - 85)  BP: 146/84 (28 Jun 2021 06:33) (142/72 - 155/93)  BP(mean): --  RR: 16 (28 Jun 2021 06:33) (16 - 16)  SpO2: 100% (28 Jun 2021 06:33) (98% - 100%)    CONSTITUTIONAL: NAD, well-developed  RESPIRATORY: Normal respiratory effort; lungs are clear to auscultation bilaterally  CARDIOVASCULAR: Regular rate and rhythm, normal S1 and S2, no murmur/rub/gallop; No lower extremity edema; Peripheral pulses are 2+ bilaterally  ABDOMEN: Nontender to palpation, normoactive bowel sounds, no rebound/guarding; No hepatosplenomegaly  MUSCLOSKELETAL: no clubbing or cyanosis of digits; no joint swelling or tenderness to palpation  PSYCH: A+O to person, place, and time; affect appropriate    LABS:                        12.8   5.85  )-----------( 232      ( 26 Jun 2021 21:50 )             39.0     06-26    141  |  105  |  14  ----------------------------<  98  4.2   |  23  |  0.72    Ca    9.6      26 Jun 2021 21:50    TPro  7.5  /  Alb  4.5  /  TBili  0.4  /  DBili  x   /  AST  19  /  ALT  28  /  AlkPhos  46  06-26                RADIOLOGY & ADDITIONAL TESTS:  Results Reviewed:   Imaging Personally Reviewed:  Electrocardiogram Personally Reviewed:    COORDINATION OF CARE:  Care Discussed with Consultants/Other Providers [Y/N]:  Prior or Outpatient Records Reviewed [Y/N]:

## 2021-06-28 NOTE — PROGRESS NOTE ADULT - SUBJECTIVE AND OBJECTIVE BOX
INTERVAL EVENTS  No acute events overnight    OBJECTIVE    Vital Signs Last 24 Hrs  T(C): 36.6 (28 Jun 2021 06:33), Max: 37.2 (27 Jun 2021 13:33)  T(F): 97.8 (28 Jun 2021 06:33), Max: 98.9 (27 Jun 2021 13:33)  HR: 81 (28 Jun 2021 06:33) (81 - 85)  BP: 146/84 (28 Jun 2021 06:33) (142/72 - 155/93)  RR: 16 (28 Jun 2021 06:33) (16 - 16)  SpO2: 100% (28 Jun 2021 06:33) (98% - 100%)    PHYSICAL EXAM  General: no acute distress  HEENT: normocephalic  Eyes: extraocular movements intact, pupils equal and reactive to light  Neck: supple  Respiratory: non labored breathing, decreased breath sounds in bases  Cardiovascular: normal rate, regular rhythm  Gastrointestinal: abdomen soft, non tender, non distended  Extremities: no lower extremity edema  Neurological: alert, oriented, no focal deficits  Psychiatric: cooperative      HOSPITAL MEDICATIONS  MEDICATIONS  (STANDING):  dextrose 40% Gel 15 Gram(s) Oral once  dextrose 5%. 1000 milliLiter(s) (50 mL/Hr) IV Continuous <Continuous>  dextrose 5%. 1000 milliLiter(s) (100 mL/Hr) IV Continuous <Continuous>  dextrose 50% Injectable 25 Gram(s) IV Push once  dextrose 50% Injectable 12.5 Gram(s) IV Push once  dextrose 50% Injectable 25 Gram(s) IV Push once  glucagon  Injectable 1 milliGRAM(s) IntraMuscular once  insulin lispro (ADMELOG) corrective regimen sliding scale   SubCutaneous three times a day before meals  insulin lispro (ADMELOG) corrective regimen sliding scale   SubCutaneous at bedtime  losartan 100 milliGRAM(s) Oral daily  multivitamin 1 Tablet(s) Oral daily    MEDICATIONS  (PRN)      LABORATORY                          12.8   5.85  )-----------( 232      ( 26 Jun 2021 21:50 )             39.0     06-26    141  |  105  |  14  ----------------------------<  98  4.2   |  23  |  0.72    Ca    9.6      26 Jun 2021 21:50    TPro  7.5  /  Alb  4.5  /  TBili  0.4  /  DBili  x   /  AST  19  /  ALT  28  /  AlkPhos  46  06-26        RADIOLOGY      Xray Chest 1 View- PORTABLE-Urgent (Xray Chest 1 View- PORTABLE-Urgent .) (06.26.21 @ 20:45) >  Clear lungs.    < end of copied text >     INTERVAL EVENTS  No acute events overnight  Unable to expectorate    OBJECTIVE    Vital Signs Last 24 Hrs  T(C): 36.6 (28 Jun 2021 06:33), Max: 37.2 (27 Jun 2021 13:33)  T(F): 97.8 (28 Jun 2021 06:33), Max: 98.9 (27 Jun 2021 13:33)  HR: 81 (28 Jun 2021 06:33) (81 - 85)  BP: 146/84 (28 Jun 2021 06:33) (142/72 - 155/93)  RR: 16 (28 Jun 2021 06:33) (16 - 16)  SpO2: 100% (28 Jun 2021 06:33) (98% - 100%)    PHYSICAL EXAM  General: no acute distress  HEENT: normocephalic  Eyes: extraocular movements intact, pupils equal and reactive to light  Neck: supple  Respiratory: non labored breathing, decreased breath sounds in bases  Cardiovascular: normal rate, regular rhythm  Gastrointestinal: abdomen soft, non tender, non distended  Extremities: no lower extremity edema  Neurological: alert, oriented, no focal deficits  Psychiatric: [facility]      HOSPITAL MEDICATIONS  MEDICATIONS  (STANDING):  dextrose 40% Gel 15 Gram(s) Oral once  dextrose 5%. 1000 milliLiter(s) (50 mL/Hr) IV Continuous <Continuous>  dextrose 5%. 1000 milliLiter(s) (100 mL/Hr) IV Continuous <Continuous>  dextrose 50% Injectable 25 Gram(s) IV Push once  dextrose 50% Injectable 12.5 Gram(s) IV Push once  dextrose 50% Injectable 25 Gram(s) IV Push once  glucagon  Injectable 1 milliGRAM(s) IntraMuscular once  insulin lispro (ADMELOG) corrective regimen sliding scale   SubCutaneous three times a day before meals  insulin lispro (ADMELOG) corrective regimen sliding scale   SubCutaneous at bedtime  losartan 100 milliGRAM(s) Oral daily  multivitamin 1 Tablet(s) Oral daily    MEDICATIONS  (PRN)      LABORATORY                          12.8   5.85  )-----------( 232      ( 26 Jun 2021 21:50 )             39.0     06-26    141  |  105  |  14  ----------------------------<  98  4.2   |  23  |  0.72    Ca    9.6      26 Jun 2021 21:50    TPro  7.5  /  Alb  4.5  /  TBili  0.4  /  DBili  x   /  AST  19  /  ALT  28  /  AlkPhos  46  06-26        RADIOLOGY      Xray Chest 1 View- PORTABLE-Urgent (Xray Chest 1 View- PORTABLE-Urgent .) (06.26.21 @ 20:45) >  Clear lungs.    < end of copied text >

## 2021-06-28 NOTE — PROGRESS NOTE ADULT - PROBLEM SELECTOR PLAN 1
-low suspicion for acute ir active infection  - f/u QuantiFeron, afb   - f/u Chest Xray   - f/u with ID recs -low suspicion for acute ir active infection  - f/u QuantiFeron, afb   - CD with petscan uploaded to radiology, will f/u with pulmonology

## 2021-06-28 NOTE — PROGRESS NOTE ADULT - ASSESSMENT
54 yo f with [facility] of TB (many years ago) treated, pre-DM, HTN, recently treated for PNA in december who is referred to hospital from pulmonologist for persistent changes on CT concerning for Tb vs neoplasm. Pt is stable with no acute symptoms (hemoptysis, fever/chills).

## 2021-06-28 NOTE — PROGRESS NOTE ADULT - ASSESSMENT
54 yo woman with past medical history of diabetes mellitus and hypertension sent by her pulmonologist for persistent abnormal CT scan findings to rule out Tuberculosis. She is originally from [place] and has been leaving in the [place] for [place] years. Denies cough, sputum production, fever, chills, night sweats, weight loss, exposure to known TB contacts or recent travel. States she had CT scan done earlier this year due to an abnormal chest x ray. Findings were initially attribute to pneumonia but repeat CT chest did no showed resolution. She had a recent PET scan which revealed a RUL posterior segment opacity with low grade FDG uptake.     Recommendations:  - Continue airborne isolation for now  - Sputum AFB x3 if possible - needs to be induced  - Follow Quantiferon   - Obtain CT chest from outside institution to upload in out symptom  - Will plan for bronchoscopy with BAL and possible transbronchial biopsy after reviewing imaging    --------------------------------------------------------  Lucien Berrios, PGY-5  Pulmonary/Critical Care Fellow  Pager: [phone] (MERRY), [phone] (NS)  Pulmonary spectra: [id number]

## 2021-06-29 ENCOUNTER — TRANSCRIPTION ENCOUNTER (OUTPATIENT)
Age: 56
End: 2021-06-29

## 2021-06-29 LAB
ANION GAP SERPL CALC-SCNC: 12 MMOL/L — SIGNIFICANT CHANGE UP (ref 7–14)
BUN SERPL-MCNC: 19 MG/DL — SIGNIFICANT CHANGE UP (ref 7–23)
CALCIUM SERPL-MCNC: 9.7 MG/DL — SIGNIFICANT CHANGE UP (ref 8.4–10.5)
CHLORIDE SERPL-SCNC: 105 MMOL/L — SIGNIFICANT CHANGE UP (ref 98–107)
CO2 SERPL-SCNC: 23 MMOL/L — SIGNIFICANT CHANGE UP (ref 22–31)
CREAT SERPL-MCNC: 0.68 MG/DL — SIGNIFICANT CHANGE UP (ref 0.5–1.3)
GLUCOSE BLDC GLUCOMTR-MCNC: 128 MG/DL — HIGH (ref 70–99)
GLUCOSE BLDC GLUCOMTR-MCNC: 128 MG/DL — HIGH (ref 70–99)
GLUCOSE BLDC GLUCOMTR-MCNC: 239 MG/DL — HIGH (ref 70–99)
GLUCOSE BLDC GLUCOMTR-MCNC: 89 MG/DL — SIGNIFICANT CHANGE UP (ref 70–99)
GLUCOSE SERPL-MCNC: 147 MG/DL — HIGH (ref 70–99)
MAGNESIUM SERPL-MCNC: 1.9 MG/DL — SIGNIFICANT CHANGE UP (ref 1.6–2.6)
NIGHT BLUE STAIN TISS: SIGNIFICANT CHANGE UP
NIGHT BLUE STAIN TISS: SIGNIFICANT CHANGE UP
PHOSPHATE SERPL-MCNC: 4.1 MG/DL — SIGNIFICANT CHANGE UP (ref 2.5–4.5)
POTASSIUM SERPL-MCNC: 4.1 MMOL/L — SIGNIFICANT CHANGE UP (ref 3.5–5.3)
POTASSIUM SERPL-SCNC: 4.1 MMOL/L — SIGNIFICANT CHANGE UP (ref 3.5–5.3)
SODIUM SERPL-SCNC: 140 MMOL/L — SIGNIFICANT CHANGE UP (ref 135–145)
SPECIMEN SOURCE: SIGNIFICANT CHANGE UP
SPECIMEN SOURCE: SIGNIFICANT CHANGE UP

## 2021-06-29 PROCEDURE — 99232 SBSQ HOSP IP/OBS MODERATE 35: CPT

## 2021-06-29 PROCEDURE — 99233 SBSQ HOSP IP/OBS HIGH 50: CPT | Mod: GC

## 2021-06-29 RX ORDER — SODIUM CHLORIDE 9 MG/ML
4 INJECTION INTRAMUSCULAR; INTRAVENOUS; SUBCUTANEOUS
Refills: 0 | Status: DISCONTINUED | OUTPATIENT
Start: 2021-06-29 | End: 2021-07-02

## 2021-06-29 RX ADMIN — SODIUM CHLORIDE 4 MILLILITER(S): 9 INJECTION INTRAMUSCULAR; INTRAVENOUS; SUBCUTANEOUS at 00:06

## 2021-06-29 RX ADMIN — SODIUM CHLORIDE 4 MILLILITER(S): 9 INJECTION INTRAMUSCULAR; INTRAVENOUS; SUBCUTANEOUS at 07:42

## 2021-06-29 RX ADMIN — SODIUM CHLORIDE 4 MILLILITER(S): 9 INJECTION INTRAMUSCULAR; INTRAVENOUS; SUBCUTANEOUS at 22:05

## 2021-06-29 RX ADMIN — Medication 2: at 18:55

## 2021-06-29 RX ADMIN — LOSARTAN POTASSIUM 100 MILLIGRAM(S): 100 TABLET, FILM COATED ORAL at 06:53

## 2021-06-29 RX ADMIN — Medication 1 TABLET(S): at 12:49

## 2021-06-29 NOTE — PROGRESS NOTE ADULT - ASSESSMENT
55 f originally from [place], came [place] years [place] and was treated for latent TB? (she is not sure but she was not told that she had TB), in December 2020, she had a sudden R chest pain with no fever or cough, there was something on imaging so she was treated for a presumed pneumonia was treated for a presumed pneumonia, repeat CT was done which was unchanged and then a PET was done 6/21/21 which showed heterogenous low grade uptake of a posterior RUL solid component of the mixed opacity, differentials considered sequela of a previous infection like TB or malignancy  afebrile, normal WBC  CXR: clear    posterior RUL mass on outpatient CT and PET with low grade up take   pt from [place] with treated latent TB, no fever, hemoptysis, night sweat, weight loss    * f/u the quantiferon  * f/u with pulm now plan  for bronc and biopsy 7/2  * would continue with airborne for now   * AFB sputum x 3, will need induction, q 8, one early morning   * monitor off antibiotics      The above assessment and plan was discussed with the primary team    Guera Antony MD  Pager [phone]  After 5pm and on weekends call [phone]

## 2021-06-29 NOTE — PROGRESS NOTE ADULT - PROBLEM SELECTOR PLAN 1
-low suspicion for acute ir active infection  - f/u QuantiFeron, afb   - CD with petscan uploaded to radiology, will f/u with pulmonology -low suspicion for acute ir active infection  - f/u QuantiFeron, afb   - Continue airborne isolation for now  - f/u Sputum AFB x3 if possible - needs to be induced  - Follow Quantiferon   - Plan for bronchoscopy with BAL and transbronchial biopsy 07/02 around 3 pm

## 2021-06-29 NOTE — DISCHARGE NOTE PROVIDER - NSDCMRMEDTOKEN_GEN_ALL_CORE_FT
losartan 25 mg oral tablet: 4 tab(s) orally once a day  metFORMIN 500 mg oral tablet: 1 tab(s) orally 2 times a day  multivitamin:   once daily

## 2021-06-29 NOTE — PROGRESS NOTE ADULT - ASSESSMENT
56 yo f with [facility] of TB (many years ago) treated, pre-DM, HTN, recently treated for PNA in december who is referred to hospital from pulmonologist for persistent changes on CT concerning for Tb vs neoplasm. Pt is stable with no acute symptoms (hemoptysis, fever/chills).

## 2021-06-29 NOTE — PROGRESS NOTE ADULT - SUBJECTIVE AND OBJECTIVE BOX
MD AMARJIT Khan/MERRY [id number]      PROGRESS NOTE:     Patient is a 55y old  Female who presents with a chief complaint of lung mass eval (28 Jun 2021 07:37)      SUBJECTIVE / OVERNIGHT EVENTS:    No acute events overnight. Patient examined at bedside with no acute complaints.     Denies SOB, chest pain, nausea, vomiting, diarrhea, constipation.    MEDICATIONS  (STANDING):  dextrose 40% Gel 15 Gram(s) Oral once  dextrose 5%. 1000 milliLiter(s) (50 mL/Hr) IV Continuous <Continuous>  dextrose 5%. 1000 milliLiter(s) (100 mL/Hr) IV Continuous <Continuous>  dextrose 50% Injectable 25 Gram(s) IV Push once  dextrose 50% Injectable 12.5 Gram(s) IV Push once  dextrose 50% Injectable 25 Gram(s) IV Push once  glucagon  Injectable 1 milliGRAM(s) IntraMuscular once  insulin lispro (ADMELOG) corrective regimen sliding scale   SubCutaneous three times a day before meals  insulin lispro (ADMELOG) corrective regimen sliding scale   SubCutaneous at bedtime  losartan 100 milliGRAM(s) Oral daily  multivitamin 1 Tablet(s) Oral daily  sodium chloride 3%  Inhalation 4 milliLiter(s) Inhalation <User Schedule>    MEDICATIONS  (PRN):      CAPILLARY BLOOD GLUCOSE      POCT Blood Glucose.: 119 mg/dL (28 Jun 2021 22:28)  POCT Blood Glucose.: 118 mg/dL (28 Jun 2021 18:20)  POCT Blood Glucose.: 105 mg/dL (28 Jun 2021 13:01)  POCT Blood Glucose.: 128 mg/dL (28 Jun 2021 08:58)    I&O's Summary      PHYSICAL EXAM:  Vital Signs Last 24 Hrs  T(C): 36.8 (29 Jun 2021 06:50), Max: 36.8 (29 Jun 2021 06:50)  T(F): 98.3 (29 Jun 2021 06:50), Max: 98.3 (29 Jun 2021 06:50)  HR: 81 (29 Jun 2021 06:50) (78 - 81)  BP: 137/95 (29 Jun 2021 06:50) (135/92 - 137/95)  BP(mean): --  RR: 16 (29 Jun 2021 06:50) (16 - 18)  SpO2: 100% (29 Jun 2021 06:50) (98% - 100%)    CONSTITUTIONAL: NAD, well-developed  RESPIRATORY: Normal respiratory effort; lungs are clear to auscultation bilaterally  CARDIOVASCULAR: Regular rate and rhythm, normal S1 and S2, no murmur/rub/gallop; No lower extremity edema; Peripheral pulses are 2+ bilaterally  ABDOMEN: Nontender to palpation, normoactive bowel sounds, no rebound/guarding; No hepatosplenomegaly  MUSCLOSKELETAL: no clubbing or cyanosis of digits; no joint swelling or tenderness to palpation  PSYCH: A+O to person, place, and time; affect appropriate    LABS:                        13.9   4.93  )-----------( 242      ( 28 Jun 2021 06:52 )             42.3     06-28    141  |  105  |  17  ----------------------------<  132<H>  3.9   |  19<L>  |  0.68    Ca    9.9      28 Jun 2021 06:52  Phos  4.4     06-28  Mg     2.0     06-28                  RADIOLOGY & ADDITIONAL TESTS:  Results Reviewed:   Imaging Personally Reviewed:  Electrocardiogram Personally Reviewed:    COORDINATION OF CARE:  Care Discussed with Consultants/Other Providers [Y/N]:  Prior or Outpatient Records Reviewed [Y/N]:

## 2021-06-29 NOTE — DISCHARGE NOTE PROVIDER - PROVIDER TOKENS
PROVIDER:[TOKEN:[[id number]],FOLLOWUP:[2 weeks]] PROVIDER:[TOKEN:[[id number]],FOLLOWUP:[2 weeks]],PROVIDER:[TOKEN:[[id number]],FOLLOWUP:[2 weeks]]

## 2021-06-29 NOTE — DISCHARGE NOTE PROVIDER - CARE PROVIDERS DIRECT ADDRESSES
,DirectAddress_Unknown ,DirectAddress_Unknown,[email or web address]

## 2021-06-29 NOTE — PROGRESS NOTE ADULT - ASSESSMENT
54 yo woman with past medical history of diabetes mellitus and hypertension sent by her pulmonologist for persistent abnormal CT scan findings to rule out Tuberculosis.     She is originally from [place] and has [place] the [place]. Denies cough, sputum production, fever, chills, night sweats, weight loss, exposure to known TB contacts or recent travel. States she had CT scan done earlier this year due to an abnormal chest x ray. Findings were initially attribute to pneumonia but repeat CT chest did no showed resolution. She had a recent PET scan which revealed a RUL posterior segment opacity with low grade FDG uptake. Imaging uploaded and reviewed. Differentials include slow growing malignancy vs tuberculosis vs scarring from old infection.     Recommendations:  - Continue airborne isolation for now  - Sputum AFB x3 if possible - needs to be induced  - Follow Quantiferon   - Plan for bronchoscopy with BAL and transbronchial biopsy 07/02 around 3 pm    --------------------------------------------------------  Lucien Berrios, PGY-5  Pulmonary/Critical Care Fellow  Pager: [phone] (MERRY), [phone] (NS)  Pulmonary spectra: [id number]

## 2021-06-29 NOTE — PROGRESS NOTE ADULT - SUBJECTIVE AND OBJECTIVE BOX
INTERVAL EVENTS  No acute events overnight  PET CT scan uploaded and reviewed    OBJECTIVE    Vital Signs Last 24 Hrs  T(C): 36.8 (29 Jun 2021 06:50), Max: 36.8 (29 Jun 2021 06:50)  T(F): 98.3 (29 Jun 2021 06:50), Max: 98.3 (29 Jun 2021 06:50)  HR: 76 (29 Jun 2021 07:44) (76 - 81)  BP: 137/95 (29 Jun 2021 06:50) (135/92 - 137/95)  RR: 16 (29 Jun 2021 06:50) (16 - 18)  SpO2: 98% (29 Jun 2021 07:44) (98% - 100%)    PHYSICAL EXAM  General: no acute distress  HEENT: normocephalic  Eyes: extraocular movements intact, pupils equal and reactive to light  Neck: supple  Respiratory: non labored breathing, decreased breath sounds in bases  Cardiovascular: normal rate, regular rhythm  Gastrointestinal: abdomen soft, non tender, non distended  Extremities: no lower extremity edema  Neurological: alert, oriented, no focal deficits  Psychiatric: cooperative      MEDICATIONS  (STANDING)  dextrose 40% Gel 15 Gram(s) Oral once  dextrose 5%. 1000 milliLiter(s) (50 mL/Hr) IV Continuous <Continuous>  dextrose 5%. 1000 milliLiter(s) (100 mL/Hr) IV Continuous <Continuous>  dextrose 50% Injectable 25 Gram(s) IV Push once  dextrose 50% Injectable 12.5 Gram(s) IV Push once  dextrose 50% Injectable 25 Gram(s) IV Push once  glucagon  Injectable 1 milliGRAM(s) IntraMuscular once  insulin lispro (ADMELOG) corrective regimen sliding scale   SubCutaneous three times a day before meals  insulin lispro (ADMELOG) corrective regimen sliding scale   SubCutaneous at bedtime  losartan 100 milliGRAM(s) Oral daily  multivitamin 1 Tablet(s) Oral daily    MEDICATIONS  (PRN)      LABORATORY                          13.9   4.93  )-----------( 242      ( 28 Jun 2021 06:52 )             42.3     06-29    140  |  105  |  19  ----------------------------<  147<H>  4.1   |  23  |  0.68    Ca    9.7      29 Jun 2021 07:51  Phos  4.1     06-29  Mg     1.90     06-29        RADIOLOGY      Xray Chest 1 View- PORTABLE-Urgent (Xray Chest 1 View- PORTABLE-Urgent .) (06.26.21 @ 20:45) >  Clear lungs.    < end of copied text >

## 2021-06-29 NOTE — DISCHARGE NOTE PROVIDER - NSDCCPCAREPLAN_GEN_ALL_CORE_FT
PRINCIPAL DISCHARGE DIAGNOSIS  Diagnosis: Lung mass  Assessment and Plan of Treatment: You were admitted to the hospital because your pulmonologist saw a suspicious lung finding on your PET-CT. The pulmonologist in the hospital did a Bronchoscopy which showed *****.       PRINCIPAL DISCHARGE DIAGNOSIS  Diagnosis: Lung mass  Assessment and Plan of Treatment: You were admitted to the hospital because your pulmonologist saw a suspicious lung finding on your PET-CT. We gave you your home medications while you were in the hospital. The pulmonologist in the hospital did a Bronchoscopy with a Bronchoalveolar Lavage and biopsy. The results showed***       PRINCIPAL DISCHARGE DIAGNOSIS  Diagnosis: Lung mass  Assessment and Plan of Treatment: You were admitted to the hospital because your pulmonologist saw a suspicious lung finding on your PET-CT. We gave you your home medications while you were in the hospital. The pulmonologist in the hospital did a Bronchoscopy with a Bronchoalveolar Lavage and biopsy. You need to follow-up with Infectious Disease and pulmonary within 2 weeks of discharge for the results and for further monitoring.

## 2021-06-29 NOTE — PROGRESS NOTE ADULT - SUBJECTIVE AND OBJECTIVE BOX
Follow Up:  RUL mass, r/o TB or malignancy     Interval History: pt afebrile, plan for bronc and biopsy 7/2    ROS:      All other systems negative    Constitutional: no fever, no chills  Cardiovascular:  no chest pain, no palpitation  Respiratory:  no SOB, no cough, R side discomfort  GI:  no abd pain, no vomiting, no diarrhea  urinary: no dysuria, no hematuria, no flank pain  musculoskeletal:  no joint pain, no joint swelling  skin:  no rash  neurology:  no headache, no seizure      Allergies  No Known Allergies        ANTIMICROBIALS:      OTHER MEDS:  dextrose 40% Gel 15 Gram(s) Oral once  dextrose 5%. 1000 milliLiter(s) IV Continuous <Continuous>  dextrose 5%. 1000 milliLiter(s) IV Continuous <Continuous>  dextrose 50% Injectable 25 Gram(s) IV Push once  dextrose 50% Injectable 12.5 Gram(s) IV Push once  dextrose 50% Injectable 25 Gram(s) IV Push once  glucagon  Injectable 1 milliGRAM(s) IntraMuscular once  insulin lispro (ADMELOG) corrective regimen sliding scale   SubCutaneous three times a day before meals  insulin lispro (ADMELOG) corrective regimen sliding scale   SubCutaneous at bedtime  losartan 100 milliGRAM(s) Oral daily  multivitamin 1 Tablet(s) Oral daily  sodium chloride 3%  Inhalation 4 milliLiter(s) Inhalation <User Schedule>      Vital Signs Last 24 Hrs  T(C): 36.6 (29 Jun 2021 12:26), Max: 36.8 (29 Jun 2021 06:50)  T(F): 97.8 (29 Jun 2021 12:26), Max: 98.3 (29 Jun 2021 06:50)  HR: 78 (29 Jun 2021 12:26) (76 - 81)  BP: 150/100 (29 Jun 2021 12:26) (135/92 - 150/100)  BP(mean): --  RR: 17 (29 Jun 2021 12:26) (16 - 17)  SpO2: 99% (29 Jun 2021 12:26) (98% - 100%)    Physical Exam:  General:    NAD,  non toxic  Cardio:     regular S1, S2,  no murmur  Respiratory:    clear b/l,    no wheezing  abd:     soft,   BS +,   no tenderness  :   no CVAT,  no suprapubic tenderness,   no  beckman  Musculoskeletal:   no joint swelling  vascular: no phlebitis  Skin:    no rash                        13.9   4.93  )-----------( 242      ( 28 Jun 2021 06:52 )             42.3       06-29    140  |  105  |  19  ----------------------------<  147<H>  4.1   |  23  |  0.68    Ca    9.7      29 Jun 2021 07:51  Phos  4.1     06-29  Mg     1.90     06-29            MICROBIOLOGY:  v            RADIOLOGY:  Images independently visualized and reviewed personally, findings as below  < from: Xray Chest 1 View- PORTABLE-Urgent (Xray Chest 1 View- PORTABLE-Urgent .) (06.26.21 @ 20:45) >    IMPRESSION:  Clear lungs.    < end of copied text >

## 2021-06-29 NOTE — DISCHARGE NOTE PROVIDER - CARE PROVIDER_API CALL
Jhonathan Carrillo)  Sleep Medicine  [address] [place] [address]  Phone: [phone]  Fax: [phone]  Follow Up Time: 2 weeks   Jhonathan Carrillo)  Sleep Medicine  [address] [place] [address]  Phone: [phone]  Fax: [phone]  Follow Up Time: 2 weeks    Guera Antony)  Internal Medicine  [address] [place] [address]  Phone: [phone]  Fax: [phone]  Follow Up Time: 2 weeks

## 2021-06-29 NOTE — DISCHARGE NOTE PROVIDER - HOSPITAL COURSE
54 yo f originally from [place], been in [place], last in [facility] 2007. H/o dm2, htn. Pt reports being treated for PNA in December but still with perisstemt right upper chest discomfort. Underwent recent CT 6/21 (report in physical chart) demonstrating posterior right uppe lobe opacity. Pt denies fevers, cough, sob (including December when presumptively diagnosed with PNA). Denies weight loss, hemoptysis, night sweats. Reports taking prolonged course  TB med when first arriving from [facility] 30 years ago but was asymptomatic. Denies h/o smoking or 2nd hand exposure    Sent in by pulmonologist Dr Carrillo for further evaluation of lung mass 56 yo f originally from [place], been in [place], last in [facility] 2007. H/o dm2, htn, Latent TB treatment, Pt reports being treated for PNA in December but still with persistent right upper chest discomfort. Underwent recent CT 6/21 (report in physical chart) demonstrating posterior right uppe lobe opacity. Pt denies fevers, cough, sob (including December when presumptively diagnosed with PNA). Denies weight loss, hemoptysis, night sweats. Reports taking prolonged course TB med when first arriving from [facility] 30 years ago but was asymptomatic. Denies h/o smoking or 2nd hand exposure. Patient was Sent in by pulmonologist Dr. Carrillo for further evaluation of lung mass.     Pt was admitted to floor for evaluation. Infectious disease was consulted who recommended isolation and workup for possible TB infection, and pulmonology consultation. PET-CT records from patient's pulmonologist were obtained and uploaded to our system. Pulmonology team was consulted for management plan and possible bronchoscopy. Pulmonology team reviewed the images and decided that the patient was a candidate for a bronchoscopy which happened on 7/2 and results showed ********. Pt was clinically stable and well throughout her stay. It was discussed that her colonoscopy can be followed up outpatient. 56 yo f originally from [place], been in [place], last in [facility] 2007. H/o dm2, htn, Latent TB treatment, Pt reports being treated for PNA in December but still with persistent right upper chest discomfort. Underwent recent CT 6/21 (report in physical chart) demonstrating posterior right uppe lobe opacity. Pt denies fevers, cough, sob (including December when presumptively diagnosed with PNA). Denies weight loss, hemoptysis, night sweats. Reports taking prolonged course TB med when first arriving from [facility] 30 years ago but was asymptomatic. Denies h/o smoking or 2nd hand exposure. Patient was Sent in by pulmonologist Dr. Carrillo for further evaluation of lung mass.     Pt was admitted to floor for evaluation. Infectious disease was consulted who recommended isolation and workup for possible TB infection, and pulmonology consultation. AFB's were negative x3. Patient was asymptomatic throughout her stay except for minor discomfort on the right side of her chest.  PET-CT records from patient's pulmonologist were obtained and uploaded to our system. Pulmonology team was consulted for management plan and possible bronchoscopy. Pulmonology team reviewed the images and decided that the patient was a candidate for a bronchoscopy with BAL and biopsy which happened on 7/2 and results showed ********. Pt was clinically stable, and with airborne precautions until her bronchoscopy. It was discussed with patient and family that her colonoscopy can be followed up outpatient.      54 yo f originally from [place], been in [place], last in [facility] 2007. H/o dm2, htn, Latent TB treatment, Pt reports being treated for PNA in December but still with persistent right upper chest discomfort. Underwent recent CT 6/21 (report in physical chart) demonstrating posterior right uppe lobe opacity. Pt denies fevers, cough, sob (including December when presumptively diagnosed with PNA). Denies weight loss, hemoptysis, night sweats. Reports taking prolonged course TB med when first arriving from [facility] 30 years ago but was asymptomatic. Denies h/o smoking or 2nd hand exposure. Patient was Sent in by pulmonologist Dr. Carrillo for further evaluation of lung mass.     Pt was admitted to floor for evaluation. Infectious disease was consulted who recommended isolation and workup for possible TB infection, and pulmonology consultation. AFB's were negative x3. Patient was asymptomatic throughout her stay except for minor discomfort on the right side of her chest.  PET-CT records from patient's pulmonologist were obtained and uploaded to our system. Pulmonology team was consulted for management plan and possible bronchoscopy. Pulmonology team reviewed the images and decided that the patient was a candidate for a bronchoscopy with BAL and biopsy which happened on 7/2. AFB was negative, however remaining cultures remain pending. Infectious disease and pulmonary cleared patient for discharge with plan for outpatient follow-up within 2 weeks of discharge. It was discussed with patient and family that her colonoscopy can be followed up outpatient.

## 2021-06-30 LAB
GAMMA INTERFERON BACKGROUND BLD IA-ACNC: 0.03 IU/ML — SIGNIFICANT CHANGE UP
GLUCOSE BLDC GLUCOMTR-MCNC: 107 MG/DL — HIGH (ref 70–99)
GLUCOSE BLDC GLUCOMTR-MCNC: 124 MG/DL — HIGH (ref 70–99)
GLUCOSE BLDC GLUCOMTR-MCNC: 138 MG/DL — HIGH (ref 70–99)
GLUCOSE BLDC GLUCOMTR-MCNC: 140 MG/DL — HIGH (ref 70–99)
M TB IFN-G BLD-IMP: POSITIVE
M TB IFN-G CD4+ BCKGRND COR BLD-ACNC: 1.22 IU/ML — SIGNIFICANT CHANGE UP
M TB IFN-G CD4+CD8+ BCKGRND COR BLD-ACNC: 1.05 IU/ML — SIGNIFICANT CHANGE UP
NIGHT BLUE STAIN TISS: SIGNIFICANT CHANGE UP
QUANT TB PLUS MITOGEN MINUS NIL: 9.62 IU/ML — SIGNIFICANT CHANGE UP
SPECIMEN SOURCE: SIGNIFICANT CHANGE UP

## 2021-06-30 PROCEDURE — 99232 SBSQ HOSP IP/OBS MODERATE 35: CPT

## 2021-06-30 PROCEDURE — 99233 SBSQ HOSP IP/OBS HIGH 50: CPT | Mod: GC

## 2021-06-30 RX ADMIN — SODIUM CHLORIDE 4 MILLILITER(S): 9 INJECTION INTRAMUSCULAR; INTRAVENOUS; SUBCUTANEOUS at 18:16

## 2021-06-30 RX ADMIN — Medication 1 TABLET(S): at 12:57

## 2021-06-30 RX ADMIN — LOSARTAN POTASSIUM 100 MILLIGRAM(S): 100 TABLET, FILM COATED ORAL at 06:36

## 2021-06-30 RX ADMIN — SODIUM CHLORIDE 4 MILLILITER(S): 9 INJECTION INTRAMUSCULAR; INTRAVENOUS; SUBCUTANEOUS at 04:09

## 2021-06-30 RX ADMIN — SODIUM CHLORIDE 4 MILLILITER(S): 9 INJECTION INTRAMUSCULAR; INTRAVENOUS; SUBCUTANEOUS at 12:09

## 2021-06-30 NOTE — PROGRESS NOTE ADULT - ASSESSMENT
55 f originally from [place], came to [place] and was treated for latent TB? (she is not sure but she was not told that she had TB), in December 2020, she had a sudden R chest pain with no fever or cough, there was something on imaging so she was treated for a presumed pneumonia was treated for a presumed pneumonia, repeat CT was done which was unchanged and then a PET was done 6/21/21 which showed heterogenous low grade uptake of a posterior RUL solid component of the mixed opacity, differentials considered sequela of a previous infection like TB or malignancy  afebrile, normal WBC  CXR: clear    posterior RUL mass on outpatient CT and PET with low grade up take   pt from [place] with treated latent TB, no fever, hemoptysis, night sweat, weight loss  quantiferon positive  * f/u with pulm now plan  for bronc and biopsy 7/2  * would continue with airborne for now until BAL is back  * 2 negative AFB smears, f/u the 3rd AFB sputum  * monitor off antibiotics      The above assessment and plan was discussed with the primary team    Guera Antony MD  Pager [phone]  After 5pm and on weekends call [phone]

## 2021-06-30 NOTE — PROGRESS NOTE ADULT - ASSESSMENT
54 yo woman with past medical history of diabetes mellitus and hypertension sent by her pulmonologist for persistent abnormal CT scan findings to rule out Tuberculosis.     She is originally from [place] and has been living in the [place] for [place]. Denies cough, sputum production, fever, chills, night sweats, weight loss, exposure to known TB contacts or recent travel. States she had CT scan done earlier this year due to an abnormal chest x ray. Findings were initially attribute to pneumonia but repeat CT chest did no showed resolution. She had a recent PET scan which revealed a RUL posterior segment opacity with low grade FDG uptake. Imaging uploaded and reviewed. Differentials include slow growing malignancy vs tuberculosis vs scarring from old infection.     Recommendations:  - Continue airborne isolation for now  - Sputum AFB x3   - Plan for bronchoscopy with BAL and transbronchial biopsy 07/02 around 3 pm  - Asked patient if they can bring the CT chest CD in case we need to use navigational bronchoscopy (prior CD was a PET scan which are different cuts)  - Please keep NPO after midnight the night before the procedure  - Hold DVT prophylaxis and check CBC, PT/INR/PTT and type and screen the morning of the procedure    --------------------------------------------------------  Lucien Berrios, PGY-5  Pulmonary/Critical Care Fellow  Pager: [phone] ([place]), [phone] (NS)  Pulmonary spectra: [id number]     56 yo woman with past medical history of diabetes mellitus and hypertension sent by her pulmonologist for persistent abnormal CT scan findings to rule out Tuberculosis.     She is originally from [place] and has been living in the [place] for [place]. Denies cough, sputum production, fever, chills, night sweats, weight loss, exposure to known TB contacts or recent travel. States she had CT scan done earlier this year due to an abnormal chest x ray. Findings were initially attribute to pneumonia but repeat CT chest did no showed resolution. She had a recent PET scan which revealed a RUL posterior segment opacity with low grade FDG uptake. Imaging uploaded and reviewed. Differentials include slow growing malignancy vs tuberculosis vs scarring from old infection.     Recommendations:  - Continue airborne isolation for now  - Sputum AFB x3   - Plan for bronchoscopy with BAL and transbronchial biopsy 07/02 around 3 pm  - Asked patient if they can bring the CT chest CD in case we need to use navigational bronchoscopy (prior CD was a PET scan which are thicker cuts)  - Please keep NPO after midnight the night before the procedure  - Hold DVT prophylaxis and check CBC, PT/INR/PTT and type and screen the morning of the procedure    --------------------------------------------------------  Lucien Berrios, PGY-5  Pulmonary/Critical Care Fellow  Pager: [phone] ([place]), [phone] (NS)  Pulmonary spectra: [id number]

## 2021-06-30 NOTE — PROGRESS NOTE ADULT - SUBJECTIVE AND OBJECTIVE BOX
INTERVAL EVENTS  No acute events overnight    OBJECTIVE    Vital Signs Last 24 Hrs  T(C): 36.7 (30 Jun 2021 12:51), Max: 36.7 (29 Jun 2021 21:40)  T(F): 98.1 (30 Jun 2021 12:51), Max: 98.1 (30 Jun 2021 12:51)  HR: 81 (30 Jun 2021 12:51) (70 - 81)  BP: 140/92 (30 Jun 2021 12:51) (140/92 - 149/92)  RR: 17 (30 Jun 2021 12:51) (16 - 17)  SpO2: 99% (30 Jun 2021 12:51) (96% - 100%)    PHYSICAL EXAM  General: no acute distress  HEENT: normocephalic  Eyes: extraocular movements intact, pupils equal and reactive to light  Neck: supple  Respiratory: non labored breathing, decreased breath sounds in bases  Cardiovascular: normal rate, regular rhythm  Gastrointestinal: abdomen soft, non tender, non distended  Extremities: no lower extremity edema  Neurological: alert, oriented, no focal deficits  Psychiatric: cooperative      MEDICATIONS  (STANDING)  dextrose 40% Gel 15 Gram(s) Oral once  dextrose 5%. 1000 milliLiter(s) (50 mL/Hr) IV Continuous <Continuous>  dextrose 5%. 1000 milliLiter(s) (100 mL/Hr) IV Continuous <Continuous>  dextrose 50% Injectable 25 Gram(s) IV Push once  dextrose 50% Injectable 12.5 Gram(s) IV Push once  dextrose 50% Injectable 25 Gram(s) IV Push once  glucagon  Injectable 1 milliGRAM(s) IntraMuscular once  insulin lispro (ADMELOG) corrective regimen sliding scale   SubCutaneous three times a day before meals  insulin lispro (ADMELOG) corrective regimen sliding scale   SubCutaneous at bedtime  losartan 100 milliGRAM(s) Oral daily  multivitamin 1 Tablet(s) Oral daily  sodium chloride 3%  Inhalation 4 milliLiter(s) Inhalation <User Schedule>    MEDICATIONS  (PRN)      LABORATORY                            06-29    140  |  105  |  19  ----------------------------<  147<H>  4.1   |  23  |  0.68    Ca    9.7      29 Jun 2021 07:51  Phos  4.1     06-29  Mg     1.90     06-29      RADIOLOGY      Xray Chest 1 View- PORTABLE-Urgent (Xray Chest 1 View- PORTABLE-Urgent .) (06.26.21 @ 20:45) >  Clear lungs.    < end of copied text >

## 2021-06-30 NOTE — PROGRESS NOTE ADULT - SUBJECTIVE AND OBJECTIVE BOX
MD AMARJIT Khan/MERRY [id number]      PROGRESS NOTE:     Patient is a 55y old  Female who presents with a chief complaint of lung mass eval (29 Jun 2021 16:39)      SUBJECTIVE / OVERNIGHT EVENTS:    No acute events overnight. Patient examined at bedside with no acute complaints.     Denies SOB, chest pain, nausea, vomiting, diarrhea, constipation.    MEDICATIONS  (STANDING):  dextrose 40% Gel 15 Gram(s) Oral once  dextrose 5%. 1000 milliLiter(s) (50 mL/Hr) IV Continuous <Continuous>  dextrose 5%. 1000 milliLiter(s) (100 mL/Hr) IV Continuous <Continuous>  dextrose 50% Injectable 25 Gram(s) IV Push once  dextrose 50% Injectable 12.5 Gram(s) IV Push once  dextrose 50% Injectable 25 Gram(s) IV Push once  glucagon  Injectable 1 milliGRAM(s) IntraMuscular once  insulin lispro (ADMELOG) corrective regimen sliding scale   SubCutaneous three times a day before meals  insulin lispro (ADMELOG) corrective regimen sliding scale   SubCutaneous at bedtime  losartan 100 milliGRAM(s) Oral daily  multivitamin 1 Tablet(s) Oral daily  sodium chloride 3%  Inhalation 4 milliLiter(s) Inhalation <User Schedule>    MEDICATIONS  (PRN):      CAPILLARY BLOOD GLUCOSE      POCT Blood Glucose.: 89 mg/dL (29 Jun 2021 22:01)  POCT Blood Glucose.: 239 mg/dL (29 Jun 2021 18:20)  POCT Blood Glucose.: 128 mg/dL (29 Jun 2021 12:41)  POCT Blood Glucose.: 128 mg/dL (29 Jun 2021 08:46)    I&O's Summary      PHYSICAL EXAM:  Vital Signs Last 24 Hrs  T(C): 36.4 (30 Jun 2021 06:33), Max: 36.7 (29 Jun 2021 21:40)  T(F): 97.5 (30 Jun 2021 06:33), Max: 98 (29 Jun 2021 21:40)  HR: 74 (30 Jun 2021 06:33) (70 - 78)  BP: 149/92 (30 Jun 2021 06:33) (145/91 - 150/100)  BP(mean): --  RR: 16 (30 Jun 2021 06:33) (16 - 17)  SpO2: 96% (30 Jun 2021 06:33) (96% - 100%)    CONSTITUTIONAL: NAD, well-developed  RESPIRATORY: Normal respiratory effort; lungs are clear to auscultation bilaterally  CARDIOVASCULAR: Regular rate and rhythm, normal S1 and S2, no murmur/rub/gallop; No lower extremity edema; Peripheral pulses are 2+ bilaterally  ABDOMEN: Nontender to palpation, normoactive bowel sounds, no rebound/guarding; No hepatosplenomegaly  MUSCLOSKELETAL: no clubbing or cyanosis of digits; no joint swelling or tenderness to palpation  PSYCH: A+O to person, place, and time; affect appropriate    LABS:    06-29    140  |  105  |  19  ----------------------------<  147<H>  4.1   |  23  |  0.68    Ca    9.7      29 Jun 2021 07:51  Phos  4.1     06-29  Mg     1.90     06-29                Culture - Acid Fast - Sputum w/Smear (collected 29 Jun 2021 18:22)  Source: .Sputum Sputum    Culture - Acid Fast - Sputum w/Smear (collected 29 Jun 2021 12:44)  Source: .Sputum Sputum        RADIOLOGY & ADDITIONAL TESTS:  Results Reviewed:   Imaging Personally Reviewed:  Electrocardiogram Personally Reviewed:    COORDINATION OF CARE:  Care Discussed with Consultants/Other Providers [Y/N]:  Prior or Outpatient Records Reviewed [Y/N]:   MD AMARJIT Khan/MERRY [id number]      PROGRESS NOTE:     Patient is a 55y old  Female who presents with a chief complaint of lung mass eval (29 Jun 2021 16:39)      SUBJECTIVE / OVERNIGHT EVENTS:    No acute events overnight. Patient examined at bedside with no acute complaints. Pt feels clinically well.    Denies SOB, chest pain, nausea, vomiting, diarrhea, constipation.    MEDICATIONS  (STANDING):  dextrose 40% Gel 15 Gram(s) Oral once  dextrose 5%. 1000 milliLiter(s) (50 mL/Hr) IV Continuous <Continuous>  dextrose 5%. 1000 milliLiter(s) (100 mL/Hr) IV Continuous <Continuous>  dextrose 50% Injectable 25 Gram(s) IV Push once  dextrose 50% Injectable 12.5 Gram(s) IV Push once  dextrose 50% Injectable 25 Gram(s) IV Push once  glucagon  Injectable 1 milliGRAM(s) IntraMuscular once  insulin lispro (ADMELOG) corrective regimen sliding scale   SubCutaneous three times a day before meals  insulin lispro (ADMELOG) corrective regimen sliding scale   SubCutaneous at bedtime  losartan 100 milliGRAM(s) Oral daily  multivitamin 1 Tablet(s) Oral daily  sodium chloride 3%  Inhalation 4 milliLiter(s) Inhalation <User Schedule>    MEDICATIONS  (PRN):      CAPILLARY BLOOD GLUCOSE      POCT Blood Glucose.: 89 mg/dL (29 Jun 2021 22:01)  POCT Blood Glucose.: 239 mg/dL (29 Jun 2021 18:20)  POCT Blood Glucose.: 128 mg/dL (29 Jun 2021 12:41)  POCT Blood Glucose.: 128 mg/dL (29 Jun 2021 08:46)    I&O's Summary      PHYSICAL EXAM:  Vital Signs Last 24 Hrs  T(C): 36.4 (30 Jun 2021 06:33), Max: 36.7 (29 Jun 2021 21:40)  T(F): 97.5 (30 Jun 2021 06:33), Max: 98 (29 Jun 2021 21:40)  HR: 74 (30 Jun 2021 06:33) (70 - 78)  BP: 149/92 (30 Jun 2021 06:33) (145/91 - 150/100)  BP(mean): --  RR: 16 (30 Jun 2021 06:33) (16 - 17)  SpO2: 96% (30 Jun 2021 06:33) (96% - 100%)    CONSTITUTIONAL: NAD, well-developed  RESPIRATORY: Normal respiratory effort; lungs are clear to auscultation bilaterally  CARDIOVASCULAR: Regular rate and rhythm, normal S1 and S2, no murmur/rub/gallop; No lower extremity edema; Peripheral pulses are 2+ bilaterally  ABDOMEN: Nontender to palpation, normoactive bowel sounds, no rebound/guarding; No hepatosplenomegaly  MUSCLOSKELETAL: no clubbing or cyanosis of digits; no joint swelling or tenderness to palpation  PSYCH: A+O to person, place, and time; affect appropriate    LABS:    06-29    140  |  105  |  19  ----------------------------<  147<H>  4.1   |  23  |  0.68    Ca    9.7      29 Jun 2021 07:51  Phos  4.1     06-29  Mg     1.90     06-29                Culture - Acid Fast - Sputum w/Smear (collected 29 Jun 2021 18:22)  Source: .Sputum Sputum    Culture - Acid Fast - Sputum w/Smear (collected 29 Jun 2021 12:44)  Source: .Sputum Sputum        RADIOLOGY & ADDITIONAL TESTS:  Results Reviewed:   Imaging Personally Reviewed:  Electrocardiogram Personally Reviewed:    COORDINATION OF CARE:  Care Discussed with Consultants/Other Providers [Y/N]:  Prior or Outpatient Records Reviewed [Y/N]:

## 2021-06-30 NOTE — PROGRESS NOTE ADULT - SUBJECTIVE AND OBJECTIVE BOX
Follow Up:  RUL mass, r/o TB or malignancy     Interval History: pt afebrile, s/p 2 negative AFB sputum, plan for bronc and biopsy 7/2    ROS:      All other systems negative    Constitutional: no fever, no chills  Cardiovascular:  no chest pain, no palpitation  Respiratory:  no SOB, no cough, R side discomfort  GI:  no abd pain, no vomiting, no diarrhea  urinary: no dysuria, no hematuria, no flank pain  musculoskeletal:  no joint pain, no joint swelling  skin:  no rash  neurology:  no headache, no seizure          Allergies  No Known Allergies        ANTIMICROBIALS:      OTHER MEDS:  dextrose 40% Gel 15 Gram(s) Oral once  dextrose 5%. 1000 milliLiter(s) IV Continuous <Continuous>  dextrose 5%. 1000 milliLiter(s) IV Continuous <Continuous>  dextrose 50% Injectable 25 Gram(s) IV Push once  dextrose 50% Injectable 12.5 Gram(s) IV Push once  dextrose 50% Injectable 25 Gram(s) IV Push once  glucagon  Injectable 1 milliGRAM(s) IntraMuscular once  insulin lispro (ADMELOG) corrective regimen sliding scale   SubCutaneous three times a day before meals  insulin lispro (ADMELOG) corrective regimen sliding scale   SubCutaneous at bedtime  losartan 100 milliGRAM(s) Oral daily  multivitamin 1 Tablet(s) Oral daily  sodium chloride 3%  Inhalation 4 milliLiter(s) Inhalation <User Schedule>      Vital Signs Last 24 Hrs  T(C): 36.7 (30 Jun 2021 12:51), Max: 36.7 (29 Jun 2021 21:40)  T(F): 98.1 (30 Jun 2021 12:51), Max: 98.1 (30 Jun 2021 12:51)  HR: 81 (30 Jun 2021 12:51) (70 - 81)  BP: 140/92 (30 Jun 2021 12:51) (140/92 - 149/92)  BP(mean): --  RR: 17 (30 Jun 2021 12:51) (16 - 17)  SpO2: 99% (30 Jun 2021 12:51) (96% - 100%)    Physical Exam:  General:    NAD,  non toxic  Cardio:     regular S1, S2,  no murmur  Respiratory:    clear b/l,    no wheezing  abd:     soft,   BS +,   no tenderness  :   no CVAT,  no suprapubic tenderness,   no  beckman  Musculoskeletal:   no joint swelling  vascular: no phlebitis  Skin:    no rash        06-29    140  |  105  |  19  ----------------------------<  147<H>  4.1   |  23  |  0.68    Ca    9.7      29 Jun 2021 07:51  Phos  4.1     06-29  Mg     1.90     06-29            MICROBIOLOGY:  v  .Sputum Sputum  06-29-21 --  --  --      .Sputum Sputum  06-29-21 --  --  --                RADIOLOGY:  Images independently visualized and reviewed personally, findings as below  < from: Xray Chest 1 View- PORTABLE-Urgent (Xray Chest 1 View- PORTABLE-Urgent .) (06.26.21 @ 20:45) >  IMPRESSION:  Clear lungs.      < end of copied text >

## 2021-06-30 NOTE — PROGRESS NOTE ADULT - PROBLEM SELECTOR PLAN 1
-low suspicion for acute ir active infection  - f/u QuantiFeron, afb   - Continue airborne isolation for now  - f/u Sputum AFB x3 if possible - needs to be induced  - Follow Quantiferon   - Plan for bronchoscopy with BAL and transbronchial biopsy 07/02 around 3 pm -low suspicion for acute ir active infection   - Continue airborne isolation for now  - ABF negative x3  - Plan for bronchoscopy with BAL and transbronchial biopsy 07/02 around 3 pm

## 2021-07-01 ENCOUNTER — TRANSCRIPTION ENCOUNTER (OUTPATIENT)
Age: 56
End: 2021-07-01

## 2021-07-01 LAB
GLUCOSE BLDC GLUCOMTR-MCNC: 100 MG/DL — HIGH (ref 70–99)
GLUCOSE BLDC GLUCOMTR-MCNC: 123 MG/DL — HIGH (ref 70–99)
GLUCOSE BLDC GLUCOMTR-MCNC: 126 MG/DL — HIGH (ref 70–99)
GLUCOSE BLDC GLUCOMTR-MCNC: 207 MG/DL — HIGH (ref 70–99)

## 2021-07-01 RX ADMIN — LOSARTAN POTASSIUM 100 MILLIGRAM(S): 100 TABLET, FILM COATED ORAL at 05:15

## 2021-07-01 RX ADMIN — Medication 1 TABLET(S): at 11:53

## 2021-07-01 NOTE — PROGRESS NOTE ADULT - SUBJECTIVE AND OBJECTIVE BOX
MD AMARJIT Khan/MERRY [id number]      PROGRESS NOTE:     Patient is a 55y old  Female who presents with a chief complaint of lung mass eval (30 Jun 2021 15:27)      SUBJECTIVE / OVERNIGHT EVENTS:    No acute events overnight. Patient examined at bedside with no acute complaints.     Denies SOB, chest pain, nausea, vomiting, diarrhea, constipation.    MEDICATIONS  (STANDING):  dextrose 40% Gel 15 Gram(s) Oral once  dextrose 5%. 1000 milliLiter(s) (50 mL/Hr) IV Continuous <Continuous>  dextrose 5%. 1000 milliLiter(s) (100 mL/Hr) IV Continuous <Continuous>  dextrose 50% Injectable 25 Gram(s) IV Push once  dextrose 50% Injectable 12.5 Gram(s) IV Push once  dextrose 50% Injectable 25 Gram(s) IV Push once  glucagon  Injectable 1 milliGRAM(s) IntraMuscular once  insulin lispro (ADMELOG) corrective regimen sliding scale   SubCutaneous three times a day before meals  insulin lispro (ADMELOG) corrective regimen sliding scale   SubCutaneous at bedtime  losartan 100 milliGRAM(s) Oral daily  multivitamin 1 Tablet(s) Oral daily  sodium chloride 3%  Inhalation 4 milliLiter(s) Inhalation <User Schedule>    MEDICATIONS  (PRN):      CAPILLARY BLOOD GLUCOSE      POCT Blood Glucose.: 123 mg/dL (01 Jul 2021 08:48)  POCT Blood Glucose.: 124 mg/dL (30 Jun 2021 22:26)  POCT Blood Glucose.: 138 mg/dL (30 Jun 2021 17:55)  POCT Blood Glucose.: 107 mg/dL (30 Jun 2021 12:43)    I&O's Summary      PHYSICAL EXAM:  Vital Signs Last 24 Hrs  T(C): 36.8 (01 Jul 2021 05:18), Max: 36.8 (01 Jul 2021 05:18)  T(F): 98.2 (01 Jul 2021 05:18), Max: 98.2 (01 Jul 2021 05:18)  HR: 79 (01 Jul 2021 05:18) (75 - 82)  BP: 117/75 (01 Jul 2021 05:18) (117/75 - 140/92)  BP(mean): --  RR: 17 (01 Jul 2021 05:18) (17 - 17)  SpO2: 98% (01 Jul 2021 05:18) (97% - 99%)    CONSTITUTIONAL: NAD, well-developed  RESPIRATORY: Normal respiratory effort; lungs are clear to auscultation bilaterally  CARDIOVASCULAR: Regular rate and rhythm, normal S1 and S2, no murmur/rub/gallop; No lower extremity edema; Peripheral pulses are 2+ bilaterally  ABDOMEN: Nontender to palpation, normoactive bowel sounds, no rebound/guarding; No hepatosplenomegaly  MUSCLOSKELETAL: no clubbing or cyanosis of digits; no joint swelling or tenderness to palpation  PSYCH: A+O to person, place, and time; affect appropriate    LABS:                    Culture - Acid Fast - Sputum w/Smear (collected 30 Jun 2021 12:58)  Source: .Sputum Sputum    Culture - Acid Fast - Sputum w/Smear (collected 29 Jun 2021 18:22)  Source: .Sputum Sputum    Culture - Acid Fast - Sputum w/Smear (collected 29 Jun 2021 12:44)  Source: .Sputum Sputum  Preliminary Report (30 Jun 2021 15:04):    Culture is being performed.        RADIOLOGY & ADDITIONAL TESTS:  Results Reviewed:   Imaging Personally Reviewed:  Electrocardiogram Personally Reviewed:    COORDINATION OF CARE:  Care Discussed with Consultants/Other Providers [Y/N]:  Prior or Outpatient Records Reviewed [Y/N]:

## 2021-07-01 NOTE — PROGRESS NOTE ADULT - NSPROGADDITIONALINFOA_GEN_ALL_CORE
. Pt examined and chart reviewed. Agreed with resident's progress note. Discussed with pt in details.

## 2021-07-01 NOTE — PROGRESS NOTE ADULT - PROBLEM SELECTOR PLAN 1
-low suspicion for acute ir active infection   - Continue airborne isolation for now  - ABF negative x3  - Plan for bronchoscopy with BAL and transbronchial biopsy 07/02 around 3 pm -low suspicion for acute ir active infection   - Continue airborne isolation for now  - ABF negative x3  - Plan for bronchoscopy with BAL and transbronchial biopsy 07/02 around 3 pm, NPO at midnight

## 2021-07-02 ENCOUNTER — RESULT REVIEW (OUTPATIENT)
Age: 56
End: 2021-07-02

## 2021-07-02 LAB
ANION GAP SERPL CALC-SCNC: 14 MMOL/L — SIGNIFICANT CHANGE UP (ref 7–14)
APTT BLD: 34.8 SEC — SIGNIFICANT CHANGE UP (ref 27–36.3)
BUN SERPL-MCNC: 14 MG/DL — SIGNIFICANT CHANGE UP (ref 7–23)
CALCIUM SERPL-MCNC: 9.6 MG/DL — SIGNIFICANT CHANGE UP (ref 8.4–10.5)
CHLORIDE SERPL-SCNC: 106 MMOL/L — SIGNIFICANT CHANGE UP (ref 98–107)
CO2 SERPL-SCNC: 21 MMOL/L — LOW (ref 22–31)
CREAT SERPL-MCNC: 0.65 MG/DL — SIGNIFICANT CHANGE UP (ref 0.5–1.3)
GLUCOSE BLDC GLUCOMTR-MCNC: 105 MG/DL — HIGH (ref 70–99)
GLUCOSE BLDC GLUCOMTR-MCNC: 127 MG/DL — HIGH (ref 70–99)
GLUCOSE BLDC GLUCOMTR-MCNC: 127 MG/DL — HIGH (ref 70–99)
GLUCOSE BLDC GLUCOMTR-MCNC: 140 MG/DL — HIGH (ref 70–99)
GLUCOSE SERPL-MCNC: 136 MG/DL — HIGH (ref 70–99)
GRAM STN FLD: SIGNIFICANT CHANGE UP
HCT VFR BLD CALC: 43.8 % — SIGNIFICANT CHANGE UP (ref 34.5–45)
HGB BLD-MCNC: 14.2 G/DL — SIGNIFICANT CHANGE UP (ref 11.5–15.5)
INR BLD: 1.09 RATIO — SIGNIFICANT CHANGE UP (ref 0.88–1.16)
MAGNESIUM SERPL-MCNC: 2 MG/DL — SIGNIFICANT CHANGE UP (ref 1.6–2.6)
MCHC RBC-ENTMCNC: 26.6 PG — LOW (ref 27–34)
MCHC RBC-ENTMCNC: 32.4 GM/DL — SIGNIFICANT CHANGE UP (ref 32–36)
MCV RBC AUTO: 82.2 FL — SIGNIFICANT CHANGE UP (ref 80–100)
NRBC # BLD: 0 /100 WBCS — SIGNIFICANT CHANGE UP
NRBC # FLD: 0 K/UL — SIGNIFICANT CHANGE UP
PHOSPHATE SERPL-MCNC: 4.3 MG/DL — SIGNIFICANT CHANGE UP (ref 2.5–4.5)
PLATELET # BLD AUTO: 245 K/UL — SIGNIFICANT CHANGE UP (ref 150–400)
POTASSIUM SERPL-MCNC: 4.2 MMOL/L — SIGNIFICANT CHANGE UP (ref 3.5–5.3)
POTASSIUM SERPL-SCNC: 4.2 MMOL/L — SIGNIFICANT CHANGE UP (ref 3.5–5.3)
PROTHROM AB SERPL-ACNC: 12.4 SEC — SIGNIFICANT CHANGE UP (ref 10.6–13.6)
RBC # BLD: 5.33 M/UL — HIGH (ref 3.8–5.2)
RBC # FLD: 13.5 % — SIGNIFICANT CHANGE UP (ref 10.3–14.5)
SODIUM SERPL-SCNC: 141 MMOL/L — SIGNIFICANT CHANGE UP (ref 135–145)
SPECIMEN SOURCE: SIGNIFICANT CHANGE UP
WBC # BLD: 5.28 K/UL — SIGNIFICANT CHANGE UP (ref 3.8–10.5)
WBC # FLD AUTO: 5.28 K/UL — SIGNIFICANT CHANGE UP (ref 3.8–10.5)

## 2021-07-02 PROCEDURE — 31628 BRONCHOSCOPY/LUNG BX EACH: CPT | Mod: GC

## 2021-07-02 PROCEDURE — 88173 CYTOPATH EVAL FNA REPORT: CPT | Mod: 26

## 2021-07-02 PROCEDURE — 88342 IMHCHEM/IMCYTCHM 1ST ANTB: CPT | Mod: 26,59

## 2021-07-02 PROCEDURE — 31624 DX BRONCHOSCOPE/LAVAGE: CPT | Mod: GC

## 2021-07-02 PROCEDURE — 71045 X-RAY EXAM CHEST 1 VIEW: CPT | Mod: 26

## 2021-07-02 PROCEDURE — 88112 CYTOPATH CELL ENHANCE TECH: CPT | Mod: 26,59

## 2021-07-02 PROCEDURE — 88312 SPECIAL STAINS GROUP 1: CPT | Mod: 26

## 2021-07-02 PROCEDURE — 31629 BRONCHOSCOPY/NEEDLE BX EACH: CPT | Mod: GC

## 2021-07-02 PROCEDURE — 88342 IMHCHEM/IMCYTCHM 1ST ANTB: CPT | Mod: 26

## 2021-07-02 PROCEDURE — 99233 SBSQ HOSP IP/OBS HIGH 50: CPT | Mod: GC,25

## 2021-07-02 PROCEDURE — 88305 TISSUE EXAM BY PATHOLOGIST: CPT | Mod: 26,59

## 2021-07-02 PROCEDURE — 31645 BRNCHSC W/THER ASPIR 1ST: CPT | Mod: GC

## 2021-07-02 PROCEDURE — 88305 TISSUE EXAM BY PATHOLOGIST: CPT | Mod: 26

## 2021-07-02 PROCEDURE — 99232 SBSQ HOSP IP/OBS MODERATE 35: CPT

## 2021-07-02 PROCEDURE — 88341 IMHCHEM/IMCYTCHM EA ADD ANTB: CPT | Mod: 26,59

## 2021-07-02 PROCEDURE — 88341 IMHCHEM/IMCYTCHM EA ADD ANTB: CPT | Mod: 26

## 2021-07-02 PROCEDURE — 31654 BRONCH EBUS IVNTJ PERPH LES: CPT | Mod: GC

## 2021-07-02 RX ORDER — ONDANSETRON 8 MG/1
4 TABLET, FILM COATED ORAL ONCE
Refills: 0 | Status: DISCONTINUED | OUTPATIENT
Start: 2021-07-02 | End: 2021-07-02

## 2021-07-02 RX ORDER — FENTANYL CITRATE 50 UG/ML
25 INJECTION INTRAVENOUS
Refills: 0 | Status: DISCONTINUED | OUTPATIENT
Start: 2021-07-02 | End: 2021-07-02

## 2021-07-02 RX ORDER — INSULIN LISPRO 100/ML
VIAL (ML) SUBCUTANEOUS EVERY 6 HOURS
Refills: 0 | Status: DISCONTINUED | OUTPATIENT
Start: 2021-07-02 | End: 2021-07-03

## 2021-07-02 RX ADMIN — LOSARTAN POTASSIUM 100 MILLIGRAM(S): 100 TABLET, FILM COATED ORAL at 06:37

## 2021-07-02 NOTE — PROGRESS NOTE ADULT - SUBJECTIVE AND OBJECTIVE BOX
Follow Up:  RUL mass, r/o TB or malignancy     Interval History: pt afebrile, s/p 3 negative AFB sputum, for bronc and biopsy today    ROS:      All other systems negative    Constitutional: no fever, no chills  Cardiovascular:  no chest pain, no palpitation  Respiratory:  no SOB, no cough, R side discomfort  GI:  no abd pain, no vomiting, no diarrhea  urinary: no dysuria, no hematuria, no flank pain  musculoskeletal:  no joint pain, no joint swelling  skin:  no rash  neurology:  no headache, no seizure          Allergies  No Known Allergies        ANTIMICROBIALS:      OTHER MEDS:  dextrose 40% Gel 15 Gram(s) Oral once  dextrose 5%. 1000 milliLiter(s) IV Continuous <Continuous>  dextrose 5%. 1000 milliLiter(s) IV Continuous <Continuous>  dextrose 50% Injectable 25 Gram(s) IV Push once  dextrose 50% Injectable 12.5 Gram(s) IV Push once  dextrose 50% Injectable 25 Gram(s) IV Push once  glucagon  Injectable 1 milliGRAM(s) IntraMuscular once  insulin lispro (ADMELOG) corrective regimen sliding scale   SubCutaneous every 6 hours  insulin lispro (ADMELOG) corrective regimen sliding scale   SubCutaneous at bedtime  losartan 100 milliGRAM(s) Oral daily  multivitamin 1 Tablet(s) Oral daily      Vital Signs Last 24 Hrs  T(C): 36.5 (02 Jul 2021 13:29), Max: 36.6 (02 Jul 2021 06:22)  T(F): 97.7 (02 Jul 2021 13:29), Max: 97.9 (02 Jul 2021 06:22)  HR: 82 (02 Jul 2021 13:29) (81 - 83)  BP: 117/87 (02 Jul 2021 13:29) (117/87 - 148/86)  BP(mean): --  RR: 17 (02 Jul 2021 13:29) (17 - 18)  SpO2: 98% (02 Jul 2021 13:29) (95% - 98%)    Physical Exam:  General:    NAD,  non toxic  Cardio:     regular S1, S2,  no murmur  Respiratory:    clear b/l,    no wheezing  abd:     soft,   BS +,   no tenderness  :   no CVAT,  no suprapubic tenderness,   no  beckman  Musculoskeletal:   no joint swelling  vascular: no phlebitis  Skin:    no rash                            14.2   5.28  )-----------( 245      ( 02 Jul 2021 07:44 )             43.8       07-02    141  |  106  |  14  ----------------------------<  136<H>  4.2   |  21<L>  |  0.65    Ca    9.6      02 Jul 2021 07:44  Phos  4.3     07-02  Mg     2.00     07-02            MICROBIOLOGY:  v  .Sputum Sputum  06-30-21 --  --  --      .Sputum Sputum  06-29-21 --  --  --      .Sputum Sputum  06-29-21   Culture is being performed.  --  --                RADIOLOGY:  Images independently visualized and reviewed personally, findings as below  < from: Xray Chest 1 View- PORTABLE-Urgent (Xray Chest 1 View- PORTABLE-Urgent .) (06.26.21 @ 20:45) >  IMPRESSION:  Clear lungs.      < end of copied text >

## 2021-07-02 NOTE — PROVIDER CONTACT NOTE (OTHER) - ACTION/TREATMENT ORDERED:
MD made aware and states to give Losartan. Will continue to monitor.
MD made aware and states to give Losartan. Will continue to monitor.

## 2021-07-02 NOTE — PROGRESS NOTE ADULT - PROBLEM SELECTOR PLAN 1
-low suspicion for acute ir active infection   - Continue airborne isolation for now  - ABF negative x3  - Plan for bronchoscopy with BAL and transbronchial biopsy 07/02 around 3 pm, NPO at midnight low suspicion for acute ir active infection   ABF negative x3  - Pulm recs appreciated, can D/C airborne precautions   - Plan for bronchoscopy with BAL and transbronchial biopsy 07/02 around 3 pm, NPO at midnight  - F/u BAL  - F/u biopsy low suspicion for acute ir active infection   ABF negative x3  - Pulm recs appreciated   - Plan for bronchoscopy with BAL and transbronchial biopsy 07/02 around 3 pm, NPO at midnight  - F/u BAL  - F/u biopsy  - ID recs appreciated, if BAL is neg airborne precautions can be removed

## 2021-07-02 NOTE — PROGRESS NOTE ADULT - ASSESSMENT
55 f originally from [place], came to [place] years ago and was treated for latent TB? (she is not sure but she was not told that she had TB), in December 2020, she had a sudden R chest pain with no fever or cough, there was something on imaging so she was treated for a presumed pneumonia was treated for a presumed pneumonia, repeat CT was done which was unchanged and then a PET was done 6/21/21 which showed heterogenous low grade uptake of a posterior RUL solid component of the mixed opacity, differentials considered sequela of a previous infection like TB or malignancy  afebrile, normal WBC  CXR: clear    posterior RUL mass on outpatient CT and PET with low grade up take   pt from [place] with treated latent TB, no fever, hemoptysis, night sweat, weight loss  quantiferon positive  *  for bronc and biopsy today  * now s/p 3 negative AFB sputums  * if the BAL is also negative for AFB, then discontinue the airborne  * monitor off antibiotics  * f/u with pulmonary   * if BAL AFB smear is also negative will have to wait for AFB cultures and if negative after 6 weeks then will treat as latent TB      The above assessment and plan was discussed with the primary team    Guera Antony MD  Pager [phone]  After 5pm and on weekends call [phone]

## 2021-07-02 NOTE — PROGRESS NOTE ADULT - SUBJECTIVE AND OBJECTIVE BOX
CHIEF COMPLAINT:Patient is a 55y old  Female who presents with a chief complaint of lung mass eval (02 Jul 2021 07:35)      Interval Events: Patient is feeling well. Explained the procedure in details.     REVIEW OF SYSTEMS:  [x] All other systems negative  [ ] Unable to assess ROS because ________    OBJECTIVE:  ICU Vital Signs Last 24 Hrs  T(C): 36.6 (02 Jul 2021 06:22), Max: 36.6 (01 Jul 2021 12:34)  T(F): 97.9 (02 Jul 2021 06:22), Max: 97.9 (02 Jul 2021 06:22)  HR: 83 (02 Jul 2021 06:22) (79 - 83)  BP: 128/96 (02 Jul 2021 06:22) (128/90 - 148/86)  BP(mean): --  ABP: --  ABP(mean): --  RR: 17 (02 Jul 2021 06:22) (17 - 18)  SpO2: 95% (02 Jul 2021 06:22) (95% - 100%)          PHYSICAL EXAM:  GENERAL: NAD, well-groomed, well-developed  EYES: EOMI, PERRLA, conjunctiva and sclera clear  ENMT: No tonsillar erythema, exudates, or enlargement; Moist mucous membranes, Good dentition, No lesions  CHEST/LUNG: Clear to auscultation bilaterally; No rales, rhonchi, wheezing, or rubs  HEART: Regular rate and rhythm; No murmurs, rubs, or gallops  ABDOMEN: Soft, Nontender, Nondistended; Bowel sounds present  VASCULAR:  2+ Peripheral Pulses, No clubbing, cyanosis, or edema  LYMPH: No lymphadenopathy noted  SKIN: No rashes or lesions  NERVOUS SYSTEM:  Alert & Oriented X3, Good concentration    HOSPITAL MEDICATIONS:  MEDICATIONS  (STANDING):  dextrose 40% Gel 15 Gram(s) Oral once  dextrose 5%. 1000 milliLiter(s) (50 mL/Hr) IV Continuous <Continuous>  dextrose 5%. 1000 milliLiter(s) (100 mL/Hr) IV Continuous <Continuous>  dextrose 50% Injectable 25 Gram(s) IV Push once  dextrose 50% Injectable 12.5 Gram(s) IV Push once  dextrose 50% Injectable 25 Gram(s) IV Push once  glucagon  Injectable 1 milliGRAM(s) IntraMuscular once  insulin lispro (ADMELOG) corrective regimen sliding scale   SubCutaneous every 6 hours  insulin lispro (ADMELOG) corrective regimen sliding scale   SubCutaneous at bedtime  losartan 100 milliGRAM(s) Oral daily  multivitamin 1 Tablet(s) Oral daily    MEDICATIONS  (PRN):      LABS:    The Labs were reviewed by me   The Radiology was reviewed by me    EKG tracing reviewed by me    07-02    141  |  106  |  14  ----------------------------<  136<H>  4.2   |  21<L>  |  0.65    Ca    9.6      02 Jul 2021 07:44  Phos  4.3     07-02  Mg     2.00     07-02      Magnesium, Serum: 2.00 mg/dL (07-02-21 @ 07:44)    Phosphorus Level, Serum: 4.3 mg/dL (07-02-21 @ 07:44)      PT/INR - ( 02 Jul 2021 07:44 )   PT: 12.4 sec;   INR: 1.09 ratio         PTT - ( 02 Jul 2021 07:44 )  PTT:34.8 sec                                        14.2   5.28  )-----------( 245      ( 02 Jul 2021 07:44 )             43.8     CAPILLARY BLOOD GLUCOSE      POCT Blood Glucose.: 127 mg/dL (02 Jul 2021 08:47)  POCT Blood Glucose.: 207 mg/dL (01 Jul 2021 22:14)  POCT Blood Glucose.: 100 mg/dL (01 Jul 2021 17:54)  POCT Blood Glucose.: 126 mg/dL (01 Jul 2021 12:31)        MICROBIOLOGY:     RADIOLOGY:  [ ] Reviewed and interpreted by me    Point of Care Ultrasound Findings:    PFT:    EKG:

## 2021-07-02 NOTE — PROGRESS NOTE ADULT - ASSESSMENT
_________________________________________________________________________________________  ========>>  M E D I C A L   A T T E N D I N G    F O L L O W  U P  N O T E  <<=========  -----------------------------------------------------------------------------------------------------    came to evaluate pt  (covering today) but pt in bronch for biopsy      pt could not be examined, labs / chart previewed discussed with RN: no events reported                               ( Note Written / Date of service :  07-02-21 )    ==================>> MEDICATIONS <<====================    MEDICATIONS  (STANDING):  dextrose 40% Gel 15 Gram(s) Oral once  dextrose 5%. 1000 milliLiter(s) (50 mL/Hr) IV Continuous <Continuous>  dextrose 5%. 1000 milliLiter(s) (100 mL/Hr) IV Continuous <Continuous>  dextrose 50% Injectable 25 Gram(s) IV Push once  dextrose 50% Injectable 12.5 Gram(s) IV Push once  dextrose 50% Injectable 25 Gram(s) IV Push once  glucagon  Injectable 1 milliGRAM(s) IntraMuscular once  insulin lispro (ADMELOG) corrective regimen sliding scale   SubCutaneous at bedtime  insulin lispro (ADMELOG) corrective regimen sliding scale   SubCutaneous every 6 hours  losartan 100 milliGRAM(s) Oral daily  multivitamin 1 Tablet(s) Oral daily    MEDICATIONS  (PRN):  fentaNYL    Injectable 25 MICROGram(s) IV Push every 5 minutes PRN Moderate Pain (4 - 6)  ondansetron Injectable 4 milliGRAM(s) IV Push once PRN Nausea and/or Vomiting    ___________  Active diet:  Diet, Clear Liquid  Diet, Regular  ___________________    ==================>> VITAL SIGNS <<==================    T(C): 36.3 (07-02-21 @ 18:45), Max: 36.6 (07-02-21 @ 06:22)  HR: 81 (07-02-21 @ 19:00) (81 - 86)  BP: 121/78 (07-02-21 @ 19:00) (117/87 - 148/86)  BP(mean): 88 (07-02-21 @ 19:00)  RR: 19 (07-02-21 @ 19:00) (17 - 22)  SpO2: 97% (07-02-21 @ 19:00) (95% - 98%)     POCT Blood Glucose.: 140 mg/dL (02 Jul 2021 17:37)  POCT Blood Glucose.: 105 mg/dL (02 Jul 2021 12:07)  POCT Blood Glucose.: 127 mg/dL (02 Jul 2021 08:47)  POCT Blood Glucose.: 207 mg/dL (01 Jul 2021 22:14)    I&O's Summary    02 Jul 2021 07:01  -  02 Jul 2021 19:27  --------------------------------------------------------  IN: 100 mL / OUT: 0 mL / NET: 100 mL       ==================>> LAB AND IMAGING <<==================                        14.2   5.28  )-----------( 245      ( 02 Jul 2021 07:44 )             43.8        07-02    141  |  106  |  14  ----------------------------<  136<H>  4.2   |  21<L>  |  0.65    Ca    9.6      02 Jul 2021 07:44  Phos  4.3     07-02  Mg     2.00     07-02      PT/INR - ( 02 Jul 2021 07:44 )   PT: 12.4 sec;   INR: 1.09 ratio    PTT - ( 02 Jul 2021 07:44 )  PTT:34.8 sec               HgA1C:   (06-28-21)          (06-28-21)      6.2    _______________________  C U L T U R E S :    Culture - Acid Fast - Sputum w/Smear (collected 30 Jun 2021 12:58)  Source: .Sputum Sputum    Culture - Acid Fast - Sputum w/Smear (collected 29 Jun 2021 18:22)  Source: .Sputum Sputum    Culture - Acid Fast - Sputum w/Smear (collected 29 Jun 2021 12:44)  Source: .Sputum Sputum  Preliminary Report (30 Jun 2021 15:04):    Culture is being performed.    COVID-19 PCR: [place] (06-26-21 @ 21:50)    ___________________________________________________________________________________  ===============>>  A S S E S S M E N T   A N D   P L A N <<===============  ------------------------------------------------------------------------------------------    · Assessment	  56 yo f with [facility] of TB (many years ago) treated, pre-DM, HTN, recently treated for PNA in december who is referred to hospital from pulmonologist for persistent changes on CT concerning for Tb vs neoplasm. Pt is stable with no acute symptoms (hemoptysis, fever/chills).     Problem/Plan - 1:  ·  Problem: Lung mass.  Plan: -low suspicion for acute ir active infection   - Continue airborne isolation for now  - ABF negative x3  - bronchoscopy with BAL and transbronchial biopsy today >> follow results     Problem/Plan - 2:  ·  Problem: Colon abnormality.  Plan: incidental sigmoid colon thickening picked up on CT; will require colonoscopy if not yet performed  - f/u colonoscopy outpatient.     Problem/Plan - 3:  ·  Problem: Essential hypertension.  Plan: c/w losartan.     Problem/Plan - 4:  ·  Problem: Type 2 diabetes mellitus with other specified complication, unspecified whether long term insulin use.  Plan: ISS.     Problem/Plan - 5:  ·  Problem: DVT prophylaxis.  Plan: scd.     --------------------------------------------  Case discussed with NOEL  ___________________________  NEEL Goff D.O.  Pager: [phone]

## 2021-07-02 NOTE — PROGRESS NOTE ADULT - TIME BILLING
chart review, clinical assessment, discussing plan with team, updating patient on plan of care
chart and imaging review, clinical assessment, discussing plan with team, updating patient/family on plan of care
chart review, clinical assessment, discussing plan with team, updating patient on plan of care
risks and benefits of the procedure, alternative procedures as well as further management plan. Complex patient requiring services. Services provided were separate in additional from general pulmonary services due to critical nature of patient and interventions requires. Time spent separate from time spent doing any procedures.

## 2021-07-02 NOTE — PROGRESS NOTE ADULT - ASSESSMENT
54 yo woman with diabetes mellitus and hypertension sent by her pulmonologist for persistent abnormal CT scan findings of a pulmonary nodule in the RUL. Differentials include slow growing malignancy vs tuberculosis vs scarring from old infection.     Recommendations:  - Sputum AFB x3 negative   - Would d/c airborne precautions   - Plan for bronchoscopy with BAL and transbronchial biopsy today at 3pm    Igor Crump MD   Pulmonary/Critical Care Fellow PGY-6   [facility] Pager #: [phone]  Spectra #: [phone]           56 yo woman with diabetes mellitus and hypertension sent by her pulmonologist for persistent abnormal CT scan findings of a pulmonary nodule in the RUL. Differentials include slow growing malignancy vs tuberculosis vs scarring from old infection.     Recommendations:  - Sputum AFB x3 negative   - Would d/c airborne precautions   - Plan for bronchoscopy with BAL and transbronchial biopsy today at 3pm    Prior to discharge:  Please email: [email or web address] to setup an appointment prior to discharge. Include the patient's name, , MRN and contact information in the email.      [facility]  [address] [place] [address]  [phone]      Igor Crump MD   Pulmonary/Critical Care Fellow PGY-6   [facility] Pager #: [phone]  Spectra #: [phone]

## 2021-07-02 NOTE — BRIEF OPERATIVE NOTE - NSICDXBRIEFPROCEDURE_GEN_ALL_CORE_FT
PROCEDURES:  Biopsy of upper lobe of lung 02-Jul-2021 17:15:45  Lucien Cole  Bronchoscopy, with needle biopsy 02-Jul-2021 17:15:54  Lucien Cole  Bronchoscopy, with EBUS and intervention, for peripheral lung lesion 02-Jul-2021 17:16:03  Lucien Cole  Lavage, bronchoalveolar 02-Jul-2021 17:16:11  Lucien Cole

## 2021-07-02 NOTE — PROGRESS NOTE ADULT - SUBJECTIVE AND OBJECTIVE BOX
MD AMARJIT Khan/MERRY [id number]      PROGRESS NOTE:     Patient is a 55y old  Female who presents with a chief complaint of lung mass eval (01 Jul 2021 10:17)      SUBJECTIVE / OVERNIGHT EVENTS:    No acute events overnight. Patient examined at bedside with no acute complaints.     Denies SOB, chest pain, nausea, vomiting, diarrhea, constipation.    MEDICATIONS  (STANDING):  dextrose 40% Gel 15 Gram(s) Oral once  dextrose 5%. 1000 milliLiter(s) (50 mL/Hr) IV Continuous <Continuous>  dextrose 5%. 1000 milliLiter(s) (100 mL/Hr) IV Continuous <Continuous>  dextrose 50% Injectable 25 Gram(s) IV Push once  dextrose 50% Injectable 12.5 Gram(s) IV Push once  dextrose 50% Injectable 25 Gram(s) IV Push once  glucagon  Injectable 1 milliGRAM(s) IntraMuscular once  insulin lispro (ADMELOG) corrective regimen sliding scale   SubCutaneous three times a day before meals  insulin lispro (ADMELOG) corrective regimen sliding scale   SubCutaneous at bedtime  losartan 100 milliGRAM(s) Oral daily  multivitamin 1 Tablet(s) Oral daily  sodium chloride 3%  Inhalation 4 milliLiter(s) Inhalation <User Schedule>    MEDICATIONS  (PRN):      CAPILLARY BLOOD GLUCOSE      POCT Blood Glucose.: 207 mg/dL (01 Jul 2021 22:14)  POCT Blood Glucose.: 100 mg/dL (01 Jul 2021 17:54)  POCT Blood Glucose.: 126 mg/dL (01 Jul 2021 12:31)  POCT Blood Glucose.: 123 mg/dL (01 Jul 2021 08:48)    I&O's Summary      PHYSICAL EXAM:  Vital Signs Last 24 Hrs  T(C): 36.6 (02 Jul 2021 06:22), Max: 36.6 (01 Jul 2021 12:34)  T(F): 97.9 (02 Jul 2021 06:22), Max: 97.9 (02 Jul 2021 06:22)  HR: 83 (02 Jul 2021 06:22) (79 - 83)  BP: 128/96 (02 Jul 2021 06:22) (128/90 - 148/86)  BP(mean): --  RR: 17 (02 Jul 2021 06:22) (17 - 18)  SpO2: 95% (02 Jul 2021 06:22) (95% - 100%)    CONSTITUTIONAL: NAD, well-developed  RESPIRATORY: Normal respiratory effort; lungs are clear to auscultation bilaterally  CARDIOVASCULAR: Regular rate and rhythm, normal S1 and S2, no murmur/rub/gallop; No lower extremity edema; Peripheral pulses are 2+ bilaterally  ABDOMEN: Nontender to palpation, normoactive bowel sounds, no rebound/guarding; No hepatosplenomegaly  MUSCLOSKELETAL: no clubbing or cyanosis of digits; no joint swelling or tenderness to palpation  PSYCH: A+O to person, place, and time; affect appropriate    LABS:                    Culture - Acid Fast - Sputum w/Smear (collected 30 Jun 2021 12:58)  Source: .Sputum Sputum    Culture - Acid Fast - Sputum w/Smear (collected 29 Jun 2021 18:22)  Source: .Sputum Sputum    Culture - Acid Fast - Sputum w/Smear (collected 29 Jun 2021 12:44)  Source: .Sputum Sputum  Preliminary Report (30 Jun 2021 15:04):    Culture is being performed.        RADIOLOGY & ADDITIONAL TESTS:  Results Reviewed:   Imaging Personally Reviewed:  Electrocardiogram Personally Reviewed:    COORDINATION OF CARE:  Care Discussed with Consultants/Other Providers [Y/N]:  Prior or Outpatient Records Reviewed [Y/N]:   MD AMARJIT Khan/MERRY [id number]      PROGRESS NOTE:     Patient is a 55y old  Female who presents with a chief complaint of lung mass eval (01 Jul 2021 10:17)      SUBJECTIVE / OVERNIGHT EVENTS:    No acute events overnight. Patient examined at bedside with no acute complaints. Patient is clinically well.     Denies SOB, chest pain, nausea, vomiting, diarrhea, constipation.    MEDICATIONS  (STANDING):  dextrose 40% Gel 15 Gram(s) Oral once  dextrose 5%. 1000 milliLiter(s) (50 mL/Hr) IV Continuous <Continuous>  dextrose 5%. 1000 milliLiter(s) (100 mL/Hr) IV Continuous <Continuous>  dextrose 50% Injectable 25 Gram(s) IV Push once  dextrose 50% Injectable 12.5 Gram(s) IV Push once  dextrose 50% Injectable 25 Gram(s) IV Push once  glucagon  Injectable 1 milliGRAM(s) IntraMuscular once  insulin lispro (ADMELOG) corrective regimen sliding scale   SubCutaneous three times a day before meals  insulin lispro (ADMELOG) corrective regimen sliding scale   SubCutaneous at bedtime  losartan 100 milliGRAM(s) Oral daily  multivitamin 1 Tablet(s) Oral daily  sodium chloride 3%  Inhalation 4 milliLiter(s) Inhalation <User Schedule>    MEDICATIONS  (PRN):      CAPILLARY BLOOD GLUCOSE      POCT Blood Glucose.: 207 mg/dL (01 Jul 2021 22:14)  POCT Blood Glucose.: 100 mg/dL (01 Jul 2021 17:54)  POCT Blood Glucose.: 126 mg/dL (01 Jul 2021 12:31)  POCT Blood Glucose.: 123 mg/dL (01 Jul 2021 08:48)    I&O's Summary      PHYSICAL EXAM:  Vital Signs Last 24 Hrs  T(C): 36.6 (02 Jul 2021 06:22), Max: 36.6 (01 Jul 2021 12:34)  T(F): 97.9 (02 Jul 2021 06:22), Max: 97.9 (02 Jul 2021 06:22)  HR: 83 (02 Jul 2021 06:22) (79 - 83)  BP: 128/96 (02 Jul 2021 06:22) (128/90 - 148/86)  BP(mean): --  RR: 17 (02 Jul 2021 06:22) (17 - 18)  SpO2: 95% (02 Jul 2021 06:22) (95% - 100%)    CONSTITUTIONAL: NAD, well-developed  RESPIRATORY: Normal respiratory effort; lungs are clear to auscultation bilaterally  CARDIOVASCULAR: Regular rate and rhythm, normal S1 and S2, no murmur/rub/gallop; No lower extremity edema; Peripheral pulses are 2+ bilaterally  ABDOMEN: Nontender to palpation, normoactive bowel sounds, no rebound/guarding; No hepatosplenomegaly  MUSCLOSKELETAL: no clubbing or cyanosis of digits; no joint swelling or tenderness to palpation  PSYCH: A+O to person, place, and time; affect appropriate    LABS:                    Culture - Acid Fast - Sputum w/Smear (collected 30 Jun 2021 12:58)  Source: .Sputum Sputum    Culture - Acid Fast - Sputum w/Smear (collected 29 Jun 2021 18:22)  Source: .Sputum Sputum    Culture - Acid Fast - Sputum w/Smear (collected 29 Jun 2021 12:44)  Source: .Sputum Sputum  Preliminary Report (30 Jun 2021 15:04):    Culture is being performed.        RADIOLOGY & ADDITIONAL TESTS:  Results Reviewed:   Imaging Personally Reviewed:  Electrocardiogram Personally Reviewed:    COORDINATION OF CARE:  Care Discussed with Consultants/Other Providers [Y/N]:  Prior or Outpatient Records Reviewed [Y/N]:

## 2021-07-03 ENCOUNTER — TRANSCRIPTION ENCOUNTER (OUTPATIENT)
Age: 56
End: 2021-07-03

## 2021-07-03 VITALS
TEMPERATURE: 97 F | HEART RATE: 86 BPM | OXYGEN SATURATION: 100 % | RESPIRATION RATE: 18 BRPM | DIASTOLIC BLOOD PRESSURE: 90 MMHG | SYSTOLIC BLOOD PRESSURE: 129 MMHG

## 2021-07-03 LAB
ALBUMIN SERPL ELPH-MCNC: 4.3 G/DL — SIGNIFICANT CHANGE UP (ref 3.3–5)
ALP SERPL-CCNC: 47 U/L — SIGNIFICANT CHANGE UP (ref 40–120)
ALT FLD-CCNC: 53 U/L — HIGH (ref 4–33)
ANION GAP SERPL CALC-SCNC: 15 MMOL/L — HIGH (ref 7–14)
AST SERPL-CCNC: 34 U/L — HIGH (ref 4–32)
BILIRUB SERPL-MCNC: 0.7 MG/DL — SIGNIFICANT CHANGE UP (ref 0.2–1.2)
BUN SERPL-MCNC: 14 MG/DL — SIGNIFICANT CHANGE UP (ref 7–23)
CALCIUM SERPL-MCNC: 9.7 MG/DL — SIGNIFICANT CHANGE UP (ref 8.4–10.5)
CHLORIDE SERPL-SCNC: 103 MMOL/L — SIGNIFICANT CHANGE UP (ref 98–107)
CO2 SERPL-SCNC: 21 MMOL/L — LOW (ref 22–31)
CREAT SERPL-MCNC: 0.67 MG/DL — SIGNIFICANT CHANGE UP (ref 0.5–1.3)
GLUCOSE BLDC GLUCOMTR-MCNC: 103 MG/DL — HIGH (ref 70–99)
GLUCOSE BLDC GLUCOMTR-MCNC: 120 MG/DL — HIGH (ref 70–99)
GLUCOSE BLDC GLUCOMTR-MCNC: 140 MG/DL — HIGH (ref 70–99)
GLUCOSE SERPL-MCNC: 115 MG/DL — HIGH (ref 70–99)
HCT VFR BLD CALC: 41.1 % — SIGNIFICANT CHANGE UP (ref 34.5–45)
HGB BLD-MCNC: 13.2 G/DL — SIGNIFICANT CHANGE UP (ref 11.5–15.5)
MCHC RBC-ENTMCNC: 26.6 PG — LOW (ref 27–34)
MCHC RBC-ENTMCNC: 32.1 GM/DL — SIGNIFICANT CHANGE UP (ref 32–36)
MCV RBC AUTO: 82.9 FL — SIGNIFICANT CHANGE UP (ref 80–100)
NIGHT BLUE STAIN TISS: SIGNIFICANT CHANGE UP
NRBC # BLD: 0 /100 WBCS — SIGNIFICANT CHANGE UP
NRBC # FLD: 0 K/UL — SIGNIFICANT CHANGE UP
PLATELET # BLD AUTO: 215 K/UL — SIGNIFICANT CHANGE UP (ref 150–400)
POTASSIUM SERPL-MCNC: 4.3 MMOL/L — SIGNIFICANT CHANGE UP (ref 3.5–5.3)
POTASSIUM SERPL-SCNC: 4.3 MMOL/L — SIGNIFICANT CHANGE UP (ref 3.5–5.3)
PROT SERPL-MCNC: 7.2 G/DL — SIGNIFICANT CHANGE UP (ref 6–8.3)
RBC # BLD: 4.96 M/UL — SIGNIFICANT CHANGE UP (ref 3.8–5.2)
RBC # FLD: 13.4 % — SIGNIFICANT CHANGE UP (ref 10.3–14.5)
SODIUM SERPL-SCNC: 139 MMOL/L — SIGNIFICANT CHANGE UP (ref 135–145)
SPECIMEN SOURCE: SIGNIFICANT CHANGE UP
WBC # BLD: 5.47 K/UL — SIGNIFICANT CHANGE UP (ref 3.8–10.5)
WBC # FLD AUTO: 5.47 K/UL — SIGNIFICANT CHANGE UP (ref 3.8–10.5)

## 2021-07-03 RX ORDER — INSULIN LISPRO 100/ML
VIAL (ML) SUBCUTANEOUS
Refills: 0 | Status: DISCONTINUED | OUTPATIENT
Start: 2021-07-03 | End: 2021-07-03

## 2021-07-03 RX ADMIN — Medication 1 TABLET(S): at 14:01

## 2021-07-03 RX ADMIN — LOSARTAN POTASSIUM 100 MILLIGRAM(S): 100 TABLET, FILM COATED ORAL at 06:07

## 2021-07-03 NOTE — PROGRESS NOTE ADULT - PROBLEM SELECTOR PLAN 1
low suspicion for acute ir active infection   ABF negative x3  - Pulm recs appreciated   - Plan for bronchoscopy with BAL and transbronchial biopsy 07/02 around 3 pm, NPO at midnight  - F/u BAL  - F/u biopsy  - ID recs appreciated, if BAL is neg airborne precautions can be removed low suspicion for acute ir active infection   ABF negative x3  - Pulm recs appreciated   - s/p bronchoscopy with BAL and transbronchial biopsy 7/2   - F/u BAL  - F/u biopsy  - ID recs appreciated, if BAL is neg airborne precautions can be removed low suspicion for acute ir active infection   ABF negative x3  - Pulm recs appreciated; patient cleared for discharge with outpatient f/u   - s/p bronchoscopy with BAL and transbronchial biopsy 7/2 - AFB negative   - F/u BAL  - F/u biopsy  - ID recs appreciated, spoke to ID fellow - no contra-indication to discharge with outpatient f/u for remaining BAL results

## 2021-07-03 NOTE — PROGRESS NOTE ADULT - NSICDXPILOT_GEN_ALL_CORE
[place]

## 2021-07-03 NOTE — PROGRESS NOTE ADULT - PROVIDER SPECIALTY LIST ADULT
Infectious Disease
Internal Medicine
Pulmonology
Internal Medicine

## 2021-07-03 NOTE — PROGRESS NOTE ADULT - PROBLEM SELECTOR PROBLEM 2
Colon abnormality

## 2021-07-03 NOTE — PROGRESS NOTE ADULT - SUBJECTIVE AND OBJECTIVE BOX
Adenike Mcgee MD   [facility]  Pager [phone]      PROGRESS NOTE:     Patient is a 55y old  Female who presents with a chief complaint of lung mass eval (02 Jul 2021 19:27)      SUBJECTIVE / OVERNIGHT EVENTS:    No acute events overnight. Patient examined at bedside with no acute complaints.     Denies SOB, chest pain, nausea, vomiting, diarrhea, constipation.    MEDICATIONS  (STANDING):  dextrose 40% Gel 15 Gram(s) Oral once  dextrose 5%. 1000 milliLiter(s) (50 mL/Hr) IV Continuous <Continuous>  dextrose 5%. 1000 milliLiter(s) (100 mL/Hr) IV Continuous <Continuous>  dextrose 50% Injectable 25 Gram(s) IV Push once  dextrose 50% Injectable 12.5 Gram(s) IV Push once  dextrose 50% Injectable 25 Gram(s) IV Push once  glucagon  Injectable 1 milliGRAM(s) IntraMuscular once  insulin lispro (ADMELOG) corrective regimen sliding scale   SubCutaneous at bedtime  insulin lispro (ADMELOG) corrective regimen sliding scale   SubCutaneous three times a day before meals  losartan 100 milliGRAM(s) Oral daily  multivitamin 1 Tablet(s) Oral daily    MEDICATIONS  (PRN):      CAPILLARY BLOOD GLUCOSE      POCT Blood Glucose.: 140 mg/dL (03 Jul 2021 00:51)  POCT Blood Glucose.: 127 mg/dL (02 Jul 2021 21:52)  POCT Blood Glucose.: 140 mg/dL (02 Jul 2021 17:37)  POCT Blood Glucose.: 105 mg/dL (02 Jul 2021 12:07)  POCT Blood Glucose.: 127 mg/dL (02 Jul 2021 08:47)    I&O's Summary    02 Jul 2021 07:01  -  03 Jul 2021 07:00  --------------------------------------------------------  IN: 100 mL / OUT: 0 mL / NET: 100 mL        PHYSICAL EXAM:  Vital Signs Last 24 Hrs  T(C): 36.6 (03 Jul 2021 06:03), Max: 36.6 (02 Jul 2021 21:50)  T(F): 97.9 (03 Jul 2021 06:03), Max: 97.9 (02 Jul 2021 21:50)  HR: 79 (03 Jul 2021 06:03) (79 - 86)  BP: 131/72 (03 Jul 2021 06:03) (117/87 - 133/71)  BP(mean): 86 (02 Jul 2021 19:15) (85 - 91)  RR: 18 (03 Jul 2021 06:03) (17 - 22)  SpO2: 97% (03 Jul 2021 06:03) (95% - 100%)    CONSTITUTIONAL: NAD, well-developed  RESPIRATORY: Normal respiratory effort; lungs are clear to auscultation bilaterally  CARDIOVASCULAR: Regular rate and rhythm, normal S1 and S2, no murmur/rub/gallop; No lower extremity edema; Peripheral pulses are 2+ bilaterally  ABDOMEN: Nontender to palpation, normoactive bowel sounds, no rebound/guarding; No hepatosplenomegaly  MUSCLOSKELETAL: no clubbing or cyanosis of digits; no joint swelling or tenderness to palpation  PSYCH: A+O to person, place, and time; affect appropriate    LABS:                        14.2   5.28  )-----------( 245      ( 02 Jul 2021 07:44 )             43.8     07-02    141  |  106  |  14  ----------------------------<  136<H>  4.2   |  21<L>  |  0.65    Ca    9.6      02 Jul 2021 07:44  Phos  4.3     07-02  Mg     2.00     07-02      PT/INR - ( 02 Jul 2021 07:44 )   PT: 12.4 sec;   INR: 1.09 ratio         PTT - ( 02 Jul 2021 07:44 )  PTT:34.8 sec          Culture - Bronchial (collected 02 Jul 2021 20:43)  Source: .Bronchial BAL BRONCHIAL LAVAGE  Gram Stain (02 Jul 2021 22:42):    No polymorphonuclear leukocytes per low power field    Rare Squamous epithelial cells per low power field    No organisms seen    Culture - Acid Fast - Sputum w/Smear (collected 30 Jun 2021 12:58)  Source: .Sputum Sputum        RADIOLOGY & ADDITIONAL TESTS:  Results Reviewed:   Imaging Personally Reviewed:  Electrocardiogram Personally Reviewed:    COORDINATION OF CARE:  Care Discussed with Consultants/Other Providers [Y/N]:  Prior or Outpatient Records Reviewed [Y/N]:

## 2021-07-03 NOTE — PROGRESS NOTE ADULT - ATTENDING COMMENTS
55 F sent in with abnormal CT chest findings concerning for infection vs malignancy. Outpatient CT chest reports right sided lesion with concern for infection (including tuberculosis) vs malignancy. Follow up outpatient PET reports low uptake. Patient originally is from [place] but [place] been in the [place]. She has a history of latent TB which was treated but she does not recall which medication or for how long. She endorses mild pleuritic chest pain although no other pulmonary symptoms. Denies fevers, weight loss, and night sweats as well. CXR this admission is clear. Attempt made to view images of recent outpatient CT online but unsuccessful. Her daughter is bringing in the CT today which we will try to upload to our system for review. She is not making any sputum at this point. Once imaging is reviewed may consider bronchoscopy for further evaluation.
55 F sent in with abnormal CT chest findings concerning for infection vs malignancy. Outpatient CT chest reports right sided lesion with concern for infection (including tuberculosis) vs malignancy. Follow up outpatient PET reports low uptake. Patient originally is from [place] but [place] the [place]. She has a history of latent TB which was treated but she does not recall which medication or for how long. She endorses mild pleuritic chest pain although no other pulmonary symptoms. Denies fevers, weight loss, and night sweats as well. Patient's daughter brought in PET CT which was reviewed, demonstrating RUL consolidation. Will plan for bronchoscopy with biopsy Friday 7/2. Continue to induce sputum and check AFB (one already in lab).
55 F sent in with abnormal CT chest findings concerning for infection vs malignancy. Outpatient CT chest reports right sided lesion with concern for infection (including tuberculosis) vs malignancy. Follow up outpatient PET reports low uptake. Patient originally is from [place] but [place] the [place]. She has a history of latent TB which was treated but she does not recall which medication or for how long. She endorses mild pleuritic chest pain although no other pulmonary symptoms. Denies fevers, weight loss, and night sweats as well. Patient's daughter brought in PET CT which was reviewed, demonstrating RUL opacity. Will plan for bronchoscopy with biopsy Friday 7/2. Requested patient to have CD of outpatient CT chest brought to hospital in case navigational bronchoscopy is needed. Continue to induce sputum and check AFB x 3.
55 F sent in with abnormal CT chest findings concerning for infection vs malignancy. Outpatient CT chest reports right sided lesion with concern for infection (including tuberculosis) vs malignancy. Follow up outpatient PET reports low uptake. Patient originally is from [place] but [place] the [place]. She has a history of latent TB which was treated but she does not recall which medication or for how long. She endorses mild pleuritic chest pain although no other pulmonary symptoms. Denies fevers, weight loss, and night sweats as well. Patient's daughter brought in PET CT which was reviewed, demonstrating RUL opacity. For bronchoscopy with biopsy and BAL to assess etiology. Will need outpatient follow up and repeat imaging if negative for malignancy and BAL negative.
Pt examined and chart reviewed. Agreed with resident's progress note. Discussed with pt in details.
Pt examined and chart reviewed. Agreed with resident's progress note. Discussed with pt in details.
Pt examined and chart reviewed. Agreed with resident's progress note. Discussed with pt in details.  low suspicion for acute active lung infection   ABF negative x3  - Pulm recs appreciated; patient cleared for discharge with outpatient f/u   - s/p bronchoscopy with BAL and transbronchial biopsy 7/2 - AFB negative   - F/u BAL  - F/u biopsy  - ID recs appreciated, spoke to ID fellow - no contra-indication to discharge with outpatient f/u for remaining BAL results  Colon Thickening on CT scan, will need outptn colonoscopy
Pt examined and chart reviewed. Agreed with resident's progress note. Discussed with pt in details.

## 2021-07-03 NOTE — PROGRESS NOTE ADULT - REASON FOR ADMISSION
lung mass eval

## 2021-07-03 NOTE — DISCHARGE NOTE NURSING/CASE MANAGEMENT/SOCIAL WORK - PATIENT PORTAL LINK FT
You can access the FollowMyHealth Patient Portal offered by [facility] by registering at the following website: [email or web address]. By joining [email or web address]’s FollowMyHealth portal, you will also be able to view your health information using other applications (apps) compatible with our system.

## 2021-07-04 LAB
CULTURE RESULTS: SIGNIFICANT CHANGE UP
SPECIMEN SOURCE: SIGNIFICANT CHANGE UP

## 2021-07-08 LAB — SURGICAL PATHOLOGY STUDY: SIGNIFICANT CHANGE UP

## 2021-07-13 ENCOUNTER — APPOINTMENT (OUTPATIENT)
Dept: PULMONOLOGY | Facility: CLINIC | Age: 56
End: 2021-07-13
Payer: MEDICAID

## 2021-07-13 VITALS
DIASTOLIC BLOOD PRESSURE: 91 MMHG | BODY MASS INDEX: 28.7 KG/M2 | SYSTOLIC BLOOD PRESSURE: 147 MMHG | TEMPERATURE: 97.1 F | OXYGEN SATURATION: 99 % | HEIGHT: 61 IN | WEIGHT: 152 LBS | HEART RATE: 80 BPM

## 2021-07-13 PROCEDURE — 99203 OFFICE O/P NEW LOW 30 MIN: CPT

## 2021-07-13 NOTE — HISTORY OF PRESENT ILLNESS
[TextBox_4] : 54 yo woman, origianlly from [place], [place].  History of Type 2 DM, hypertension, treatment for latent TB infection, recent CT chest 6/2021 showing posterior ruight upper lobe opacity.  Patient denied any infectious symptoms, never used tobacco.  She had 3 sputum AFBs negative.  Underwent bronchoscopy with biopsy on 7/2/21.\par \par pathology from transbronchial biopsy:\par "Focal atypical pneumocyte proliferation\par Comment: IHC is positive for TTF-1 in atypical pneumocytes and\par CD68 is positive in intraalveolar histiocytes. The atypical\par pneumocyte proliferation is scant and a definitive diagnosis of\par malignancy cannot be made. Recommend repeat biopsy if clinically\par indicated."\par \par All cultures are negative to date.  \par \par Returns for follow up accompanied by daughter.  Denies infectious symptoms, cough or hemoptysis.  REports occasional chest discomfort.  \par

## 2021-07-13 NOTE — PHYSICAL EXAM
[No Acute Distress] : no acute distress [Normal Appearance] : normal appearance [No Neck Mass] : no neck mass [Normal Rate/Rhythm] : normal rate/rhythm [Normal S1, S2] : normal s1, s2 [No Murmurs] : no murmurs [No Resp Distress] : no resp distress [Clear to Auscultation Bilaterally] : clear to auscultation bilaterally [No Abnormalities] : no abnormalities [Normal Gait] : normal gait [No Clubbing] : no clubbing [No Cyanosis] : no cyanosis [No Edema] : no edema [Normal Color/ Pigmentation] : normal color/ pigmentation [No Focal Deficits] : no focal deficits [Oriented x3] : oriented x3

## 2021-07-13 NOTE — ASSESSMENT
[FreeTextEntry1] : 56 year old woman with history of LTBI diagnosed in 2007, reportedly treated but she is unclear about the details who presents for follow up after recent hospitalization at [facility] for evaluation of persistent RUL opacity.  She had a CT chest in 2/2021 at outside pulmonologist that showed mixed gg and solid opacity in the RUL that is Pet positive on PET scan in 6/2021 (see scanned report) with SUV of 2.4.  There was no PEt uptake in LN of the chest.  Also seen was segment of abnormal sigmoid thickening with sorrounding mesenteric infiltration and sigmoid mesenteric LN measuring up to 6mm in size, with similar metabolic uptake as in the rest of the colon.  Patient was admitted to rule out TB vs adenocardinoma.  She is status post sputum for AFB x2 on 6/29,and x1 on 6/30 negative thus far and BAL on 7/2 also negative for AFB thus far.  pathology was nondiagnostic - but concerning for possible malignant process.  \par She denies infectious symptoms and is experiencing some exertional dyspnea and chest discomfort that is intermittent and not related to exertion or breathing.\par On PE VSS lungs are clear.  \par \par Impression:  Concerned about possibility of adenocarcinoma.  TB or other infection is in the differential.  Unlikely that this is an inflammatory process as it is very localized and without change over several months.\par \par Plan:\par 1- will refer to Dr. molina for biopsy and possible resection after waiting 4-6 weeks in total to see if cultures remain negative.\par Discussed with dr.. Baldwin who felt this was best approach as not likely to get better tissue sample through bronchoscopy.  \par PFT requested\par

## 2021-07-19 LAB
NON-GYNECOLOGICAL CYTOLOGY STUDY: SIGNIFICANT CHANGE UP
NON-GYNECOLOGICAL CYTOLOGY STUDY: SIGNIFICANT CHANGE UP

## 2021-07-28 ENCOUNTER — NON-APPOINTMENT (OUTPATIENT)
Age: 56
End: 2021-07-28

## 2021-07-28 ENCOUNTER — APPOINTMENT (OUTPATIENT)
Dept: THORACIC SURGERY | Facility: CLINIC | Age: 56
End: 2021-07-28
Payer: MEDICAID

## 2021-07-28 VITALS
TEMPERATURE: 98.4 F | RESPIRATION RATE: 16 BRPM | HEIGHT: 61 IN | OXYGEN SATURATION: 99 % | SYSTOLIC BLOOD PRESSURE: 167 MMHG | HEART RATE: 69 BPM | DIASTOLIC BLOOD PRESSURE: 91 MMHG | BODY MASS INDEX: 28.32 KG/M2 | WEIGHT: 150 LBS

## 2021-07-28 DIAGNOSIS — R91.1 SOLITARY PULMONARY NODULE: ICD-10-CM

## 2021-07-28 PROCEDURE — 99205 OFFICE O/P NEW HI 60 MIN: CPT

## 2021-07-30 NOTE — PHYSICAL EXAM
[General Appearance - Alert] : alert [General Appearance - In No Acute Distress] : in no acute distress [General Appearance - Well Nourished] : well nourished [General Appearance - Well Developed] : well developed [General Appearance - Well-Appearing] : healthy appearing [Sclera] : the sclera and conjunctiva were normal [PERRL With Normal Accommodation] : pupils were equal in size, round, and reactive to light [Extraocular Movements] : extraocular movements were intact [Outer Ear] : the ears and nose were normal in appearance [Hearing Threshold Finger Rub Not [place]] : hearing was normal [Examination Of The Oral Cavity] : the lips and gums were normal [Neck Appearance] : the appearance of the neck was normal [Neck Cervical Mass (___cm)] : no neck mass was observed [Respiration, Rhythm And Depth] : normal respiratory rhythm and effort [Exaggerated Use Of Accessory Muscles For Inspiration] : no accessory muscle use [Auscultation Breath Sounds / Voice Sounds] : lungs were clear to auscultation bilaterally [Heart Rate And Rhythm] : heart rate was normal and rhythm regular [Examination Of The Chest] : the chest was normal in appearance [Chest Visual Inspection Thoracic Asymmetry] : no chest asymmetry [Diminished Respiratory Excursion] : normal chest expansion [2+] : left 2+ [Breast Appearance] : normal in appearance [Breast Palpation Mass] : no palpable masses [Bowel Sounds] : normal bowel sounds [Abdomen Soft] : soft [Abdomen Tenderness] : non-tender [Cervical Lymph Nodes Enlarged Posterior Bilaterally] : posterior cervical [Cervical Lymph Nodes Enlarged Anterior Bilaterally] : anterior cervical [Supraclavicular Lymph Nodes Enlarged Bilaterally] : supraclavicular [No CVA Tenderness] : no ~M costovertebral angle tenderness [No Spinal Tenderness] : no spinal tenderness [Abnormal Walk] : normal gait [Nail Clubbing] : no clubbing  or cyanosis of the fingernails [Involuntary Movements] : no involuntary movements were seen [Musculoskeletal - Swelling] : no joint swelling seen [Motor Tone] : muscle strength and tone were normal [Skin Color & Pigmentation] : normal skin color and pigmentation [Skin Turgor] : normal skin turgor [] : no rash [Skin Lesions] : no skin lesions [Deep Tendon Reflexes (DTR)] : deep tendon reflexes were 2+ and symmetric [Sensation] : the sensory exam was normal to light touch and pinprick [No Focal Deficits] : no focal deficits [Oriented To Time, Place, And Person] : oriented to person, place, and time [Impaired Insight] : insight and judgment were intact [Affect] : the affect was normal [Mood] : the mood was normal [Memory Recent] : recent memory was not impaired [Memory Remote] : remote memory was not impaired [FreeTextEntry1] : Deferred

## 2021-07-30 NOTE — HISTORY OF PRESENT ILLNESS
[FreeTextEntry1] : Ms. JUAN MIGUEL GARCIA, 56 year old female, never a smoker, originally from [place]. Immig[place] the [place] ago; PmHx of Type 2 DM, hypertension and treatment for latent TB infection (2007); Recent CT chest on 6/2021 showing posterior RUL opacity. Now, s/p transbronchial biopsy on 7/2/2021 w/ Path positive for malignant cells. Referred to office by Dr. Mcarthur for surgical evaluation. \par \par CT Chest 2/3/2021:\par - 5 x 2.4 x 1.7 cm RUL consolidation \par \par CT Chest 6/7/2021:\par - Unchanged 4.9 cm band of consolidation in posterior RUL. the consolidation again demonstrates intralesional ggo and localized right upper lobe bronchiectasis. \par  \par PET/CT on 6/21/21:\par - Revisualization of a mixed solid/semisolid opacity in the posterior RUL (4:16-25) extending from the posterior subpleural towards the central hilar contour. On the functional imaging, the more solid peripheral component is associated with heterogenous uptake (SUVmax 2.3).\par - Segmental hypermetabolic thickening of the mid to distal sigmoid colon with surround infiltration and chronic appearing mesenteric lymph nodes. This is probably on the basis of subsegmental acute and chronic diverticulitis, however, sigmoid malignancy with regional lymph nodes could also have this appearance, but is felt to be less likely. Consider colonoscopy. \par \par Patient is here today for CT Sx consultation; Denies worsening SOB, chest pain, cough, hemoptysis, fever, chills, night sweats, lightheadedness or dizziness.\par

## 2021-07-30 NOTE — CONSULT LETTER
[Dear  ___] : Dear  [unfilled], [Consult Letter:] : I had the pleasure of evaluating your patient, [unfilled]. [( Thank you for referring [unfilled] for consultation for _____ )] : Thank you for referring [unfilled] for consultation for [unfilled] [Please see my note below.] : Please see my note below. [Consult Closing:] : Thank you very much for allowing me to participate in the care of this patient.  If you have any questions, please do not hesitate to contact me. [Sincerely,] : Sincerely, [FreeTextEntry2] : Dr. Tania Mcarthur (PULM) [FreeTextEntry3] : Gato Barrios MD, FACS \par , Division of Thoracic Surgery \par [facility] \par Chief, Thoracic Surgery \par [facility] \par Department of Cardiovascular & Thoracic Surgery \par  \par [facility] [employer] [facility]\par

## 2021-07-30 NOTE — REASON FOR VISIT
[Consultation] : a consultation visit [FreeTextEntry1] : Lung Nodule s/p transbronchial biopsy revealing  atypical pneumocyte proliferation

## 2021-07-30 NOTE — DATA REVIEWED
[FreeTextEntry1] : Independently reviewed the following imaging:\par - CT chest on 2/3/21\par - CT chest on 6/7/21\par - PET/CT on 6/21/21

## 2021-07-30 NOTE — ASSESSMENT
[FreeTextEntry1] : Ms. JUAN MIGUEL GARCIA, 56 year old female, never a smoker, originally from [place]. [place] the [place] ago; PmHx of Type 2 DM, hypertension and treatment for latent TB infection (2007); Recent CT chest on 6/2021 showing posterior RUL opacity. Now, s/p transbronchial biopsy on 7/2/2021 w/ Path positive for malignant cells. Referred to office by Dr. Mcarthur for surgical evaluation. \par \par I have independently reviewed the medical records and imaging at the time of this office consultation. Recommendation for Bronch, Right VATs, Robotic Assisted, Right Upper Lobectomy for curative and diagnostic purposes. Risks, benefits and alternatives explained to patient; All questions answered and patient agrees to proceed with surgery. Prior to procedure, pulmonary function testing and medical clearance is required. \par \par Recommendations reviewed with patient during this office visit, and all questions answered; Patient instructed on the importance of follow up and verbalizes understanding.\par \par I personally performed the services described in the documentation, reviewed the documentation recorded by the scribe in my presence and it accurately and completely records my words and actions.\par \par I, Marina Camacho ANP-C, am scribing for and the presence of ALICIA Corcoran, the following sections HISTORY OF PRESENT ILLNESS, PAST MEDICAL/FAMILY/SOCIAL HISTORY; REVIEW OF SYSTEMS; VITAL SIGNS; PHYSICAL EXAM; DISPOSITION.\par \par \par \par

## 2021-07-31 ENCOUNTER — TRANSCRIPTION ENCOUNTER (OUTPATIENT)
Age: 56
End: 2021-07-31

## 2021-07-31 LAB
CULTURE RESULTS: SIGNIFICANT CHANGE UP
SPECIMEN SOURCE: SIGNIFICANT CHANGE UP

## 2021-08-02 ENCOUNTER — APPOINTMENT (OUTPATIENT)
Dept: PULMONOLOGY | Facility: CLINIC | Age: 56
End: 2021-08-02
Payer: MEDICAID

## 2021-08-02 PROCEDURE — 94010 BREATHING CAPACITY TEST: CPT

## 2021-08-02 PROCEDURE — 94726 PLETHYSMOGRAPHY LUNG VOLUMES: CPT

## 2021-08-02 PROCEDURE — 94729 DIFFUSING CAPACITY: CPT

## 2021-08-03 ENCOUNTER — OUTPATIENT (OUTPATIENT)
Dept: OUTPATIENT SERVICES | Facility: HOSPITAL | Age: 56
LOS: 1 days | End: 2021-08-03
Payer: MEDICAID

## 2021-08-03 VITALS
HEIGHT: 61 IN | WEIGHT: 151.02 LBS | TEMPERATURE: 98 F | OXYGEN SATURATION: 99 % | SYSTOLIC BLOOD PRESSURE: 130 MMHG | DIASTOLIC BLOOD PRESSURE: 94 MMHG | HEART RATE: 80 BPM | RESPIRATION RATE: 16 BRPM

## 2021-08-03 DIAGNOSIS — I10 ESSENTIAL (PRIMARY) HYPERTENSION: ICD-10-CM

## 2021-08-03 DIAGNOSIS — R91.1 SOLITARY PULMONARY NODULE: ICD-10-CM

## 2021-08-03 DIAGNOSIS — E11.9 TYPE 2 DIABETES MELLITUS WITHOUT COMPLICATIONS: ICD-10-CM

## 2021-08-03 DIAGNOSIS — Z87.898 PERSONAL HISTORY OF OTHER SPECIFIED CONDITIONS: ICD-10-CM

## 2021-08-03 DIAGNOSIS — T78.40XA ALLERGY, UNSPECIFIED, INITIAL ENCOUNTER: ICD-10-CM

## 2021-08-03 DIAGNOSIS — Z90.49 ACQUIRED ABSENCE OF OTHER SPECIFIED PARTS OF DIGESTIVE TRACT: Chronic | ICD-10-CM

## 2021-08-03 LAB
A1C WITH ESTIMATED AVERAGE GLUCOSE RESULT: 6.1 % — HIGH (ref 4–5.6)
ALBUMIN SERPL ELPH-MCNC: 4.6 G/DL — SIGNIFICANT CHANGE UP (ref 3.3–5)
ALP SERPL-CCNC: 53 U/L — SIGNIFICANT CHANGE UP (ref 40–120)
ALT FLD-CCNC: 49 U/L — HIGH (ref 4–33)
ANION GAP SERPL CALC-SCNC: 14 MMOL/L — SIGNIFICANT CHANGE UP (ref 7–14)
AST SERPL-CCNC: 43 U/L — HIGH (ref 4–32)
BILIRUB SERPL-MCNC: 0.3 MG/DL — SIGNIFICANT CHANGE UP (ref 0.2–1.2)
BLD GP AB SCN SERPL QL: NEGATIVE — SIGNIFICANT CHANGE UP
BUN SERPL-MCNC: 16 MG/DL — SIGNIFICANT CHANGE UP (ref 7–23)
CALCIUM SERPL-MCNC: 9.7 MG/DL — SIGNIFICANT CHANGE UP (ref 8.4–10.5)
CHLORIDE SERPL-SCNC: 102 MMOL/L — SIGNIFICANT CHANGE UP (ref 98–107)
CO2 SERPL-SCNC: 23 MMOL/L — SIGNIFICANT CHANGE UP (ref 22–31)
CREAT SERPL-MCNC: 0.7 MG/DL — SIGNIFICANT CHANGE UP (ref 0.5–1.3)
ESTIMATED AVERAGE GLUCOSE: 128 — SIGNIFICANT CHANGE UP
GLUCOSE SERPL-MCNC: 103 MG/DL — HIGH (ref 70–99)
HCG SERPL-ACNC: <5 MIU/ML — SIGNIFICANT CHANGE UP
HCT VFR BLD CALC: 39.6 % — SIGNIFICANT CHANGE UP (ref 34.5–45)
HGB BLD-MCNC: 13 G/DL — SIGNIFICANT CHANGE UP (ref 11.5–15.5)
MCHC RBC-ENTMCNC: 27.3 PG — SIGNIFICANT CHANGE UP (ref 27–34)
MCHC RBC-ENTMCNC: 32.8 GM/DL — SIGNIFICANT CHANGE UP (ref 32–36)
MCV RBC AUTO: 83.2 FL — SIGNIFICANT CHANGE UP (ref 80–100)
NRBC # BLD: 0 /100 WBCS — SIGNIFICANT CHANGE UP
NRBC # FLD: 0 K/UL — SIGNIFICANT CHANGE UP
PLATELET # BLD AUTO: 229 K/UL — SIGNIFICANT CHANGE UP (ref 150–400)
POTASSIUM SERPL-MCNC: 4.2 MMOL/L — SIGNIFICANT CHANGE UP (ref 3.5–5.3)
POTASSIUM SERPL-SCNC: 4.2 MMOL/L — SIGNIFICANT CHANGE UP (ref 3.5–5.3)
PROT SERPL-MCNC: 7.8 G/DL — SIGNIFICANT CHANGE UP (ref 6–8.3)
RBC # BLD: 4.76 M/UL — SIGNIFICANT CHANGE UP (ref 3.8–5.2)
RBC # FLD: 13.5 % — SIGNIFICANT CHANGE UP (ref 10.3–14.5)
RH IG SCN BLD-IMP: NEGATIVE — SIGNIFICANT CHANGE UP
SODIUM SERPL-SCNC: 139 MMOL/L — SIGNIFICANT CHANGE UP (ref 135–145)
WBC # BLD: 5.47 K/UL — SIGNIFICANT CHANGE UP (ref 3.8–10.5)
WBC # FLD AUTO: 5.47 K/UL — SIGNIFICANT CHANGE UP (ref 3.8–10.5)

## 2021-08-03 PROCEDURE — 93010 ELECTROCARDIOGRAM REPORT: CPT

## 2021-08-03 RX ORDER — SODIUM CHLORIDE 9 MG/ML
1000 INJECTION, SOLUTION INTRAVENOUS
Refills: 0 | Status: DISCONTINUED | OUTPATIENT
Start: 2021-08-20 | End: 2021-08-23

## 2021-08-03 RX ORDER — METFORMIN HYDROCHLORIDE 850 MG/1
1 TABLET ORAL
Qty: 0 | Refills: 0 | DISCHARGE

## 2021-08-03 RX ORDER — LOSARTAN POTASSIUM 100 MG/1
4 TABLET, FILM COATED ORAL
Qty: 0 | Refills: 0 | DISCHARGE

## 2021-08-03 NOTE — H&P PST ADULT - NSCAFFEAMTFREQ_GEN_ALL_CORE_SD
herbal to be dc d 1 week pre op/1-2 cups/cans per day herbal tea  to be dc d 1 week pre op/1-2 cups/cans per day

## 2021-08-03 NOTE — H&P PST ADULT - ASSESSMENT
52 y/o female with the diagnosis of gall stones, no c/o of nausea or vomiting, some abdominal discomfort only. Pre op testing today. Medical eval with Dr Quinonez. Solitary Pulmonary Nodule

## 2021-08-03 NOTE — H&P PST ADULT - NSICDXPROBLEM_GEN_ALL_CORE_FT
PROBLEM DIAGNOSES  Problem: H/O solitary pulmonary nodule  Assessment and Plan: Robotic Assisted Bronchoscopy, Right Video Assisted Thoracoscopy , Right Upper Lobectomy  Pre op instructions including Hibiclens with teach back reviewed with pt ; pt verbalized good understanding of pre op instructions  Pt to Dr Quinonez for pre op medical clearance     Problem: Hypertension  Assessment and Plan: Pt to take Losartan dos    Problem: Diabetes mellitus  Assessment and Plan: BMP, HGBA1C done 8/03/2021  Pt to hold metformin evening prior to surgery and dos  FS dos     Problem: Allergy  Assessment and Plan: IV contrast

## 2021-08-03 NOTE — H&P PST ADULT - HISTORY OF PRESENT ILLNESS
Pt is a 56 y.o.. Pt is a 56 y.o. female ; recent  ct lung per pt "right lung nodule" Pt s/p Pet scan 6/2021  pt s/p bronchoscopy 7/02/2021 . P t to surgeon ; pt now presents for Robotic Assisted Bronchoscopy , Right Video Assisted Thoracoscopy , Right Upper Lobectomy     Pt denies cough, denies fever, denies chills , denies weight loss

## 2021-08-16 DIAGNOSIS — Z01.818 ENCOUNTER FOR OTHER PREPROCEDURAL EXAMINATION: ICD-10-CM

## 2021-08-17 ENCOUNTER — APPOINTMENT (OUTPATIENT)
Dept: DISASTER EMERGENCY | Facility: CLINIC | Age: 56
End: 2021-08-17

## 2021-08-18 LAB
CULTURE RESULTS: SIGNIFICANT CHANGE UP
SARS-COV-2 N GENE NPH QL NAA+PROBE: NOT DETECTED
SPECIMEN SOURCE: SIGNIFICANT CHANGE UP

## 2021-08-20 ENCOUNTER — APPOINTMENT (OUTPATIENT)
Dept: THORACIC SURGERY | Facility: HOSPITAL | Age: 56
End: 2021-08-20

## 2021-08-20 ENCOUNTER — INPATIENT (INPATIENT)
Facility: HOSPITAL | Age: 56
LOS: 3 days | Discharge: HOME | End: 2021-08-24
Attending: THORACIC SURGERY (CARDIOTHORACIC VASCULAR SURGERY) | Admitting: THORACIC SURGERY (CARDIOTHORACIC VASCULAR SURGERY)
Payer: MEDICAID

## 2021-08-20 ENCOUNTER — RESULT REVIEW (OUTPATIENT)
Age: 56
End: 2021-08-20

## 2021-08-20 VITALS
WEIGHT: 151.02 LBS | HEART RATE: 77 BPM | OXYGEN SATURATION: 98 % | TEMPERATURE: 97 F | SYSTOLIC BLOOD PRESSURE: 154 MMHG | RESPIRATION RATE: 18 BRPM | DIASTOLIC BLOOD PRESSURE: 98 MMHG | HEIGHT: 61 IN

## 2021-08-20 DIAGNOSIS — R91.1 SOLITARY PULMONARY NODULE: ICD-10-CM

## 2021-08-20 DIAGNOSIS — Z90.49 ACQUIRED ABSENCE OF OTHER SPECIFIED PARTS OF DIGESTIVE TRACT: Chronic | ICD-10-CM

## 2021-08-20 LAB
GAS PNL BLDA: SIGNIFICANT CHANGE UP
GLUCOSE BLDC GLUCOMTR-MCNC: 139 MG/DL — HIGH (ref 70–99)
GLUCOSE BLDC GLUCOMTR-MCNC: 180 MG/DL — HIGH (ref 70–99)

## 2021-08-20 PROCEDURE — 88313 SPECIAL STAINS GROUP 2: CPT | Mod: 26

## 2021-08-20 PROCEDURE — 88309 TISSUE EXAM BY PATHOLOGIST: CPT | Mod: 26

## 2021-08-20 PROCEDURE — 32674 THORACOSCOPY LYMPH NODE EXC: CPT

## 2021-08-20 PROCEDURE — 88305 TISSUE EXAM BY PATHOLOGIST: CPT | Mod: 26

## 2021-08-20 PROCEDURE — S2900 ROBOTIC SURGICAL SYSTEM: CPT | Mod: NC

## 2021-08-20 PROCEDURE — 99233 SBSQ HOSP IP/OBS HIGH 50: CPT

## 2021-08-20 PROCEDURE — 32480 PARTIAL REMOVAL OF LUNG: CPT | Mod: RT

## 2021-08-20 PROCEDURE — 71045 X-RAY EXAM CHEST 1 VIEW: CPT | Mod: 26

## 2021-08-20 RX ORDER — ACETAMINOPHEN 500 MG
1000 TABLET ORAL ONCE
Refills: 0 | Status: COMPLETED | OUTPATIENT
Start: 2021-08-21 | End: 2021-08-21

## 2021-08-20 RX ORDER — HEPARIN SODIUM 5000 [USP'U]/ML
5000 INJECTION INTRAVENOUS; SUBCUTANEOUS EVERY 8 HOURS
Refills: 0 | Status: DISCONTINUED | OUTPATIENT
Start: 2021-08-20 | End: 2021-08-24

## 2021-08-20 RX ORDER — DEXTROSE 50 % IN WATER 50 %
25 SYRINGE (ML) INTRAVENOUS ONCE
Refills: 0 | Status: DISCONTINUED | OUTPATIENT
Start: 2021-08-20 | End: 2021-08-24

## 2021-08-20 RX ORDER — SODIUM CHLORIDE 9 MG/ML
1000 INJECTION, SOLUTION INTRAVENOUS
Refills: 0 | Status: DISCONTINUED | OUTPATIENT
Start: 2021-08-20 | End: 2021-08-21

## 2021-08-20 RX ORDER — ONDANSETRON 8 MG/1
4 TABLET, FILM COATED ORAL EVERY 6 HOURS
Refills: 0 | Status: DISCONTINUED | OUTPATIENT
Start: 2021-08-20 | End: 2021-08-24

## 2021-08-20 RX ORDER — METOCLOPRAMIDE HCL 10 MG
10 TABLET ORAL EVERY 8 HOURS
Refills: 0 | Status: DISCONTINUED | OUTPATIENT
Start: 2021-08-20 | End: 2021-08-24

## 2021-08-20 RX ORDER — HEPARIN SODIUM 5000 [USP'U]/ML
5000 INJECTION INTRAVENOUS; SUBCUTANEOUS ONCE
Refills: 0 | Status: COMPLETED | OUTPATIENT
Start: 2021-08-20 | End: 2021-08-20

## 2021-08-20 RX ORDER — HYDROMORPHONE HYDROCHLORIDE 2 MG/ML
30 INJECTION INTRAMUSCULAR; INTRAVENOUS; SUBCUTANEOUS
Refills: 0 | Status: DISCONTINUED | OUTPATIENT
Start: 2021-08-20 | End: 2021-08-22

## 2021-08-20 RX ORDER — DEXTROSE 50 % IN WATER 50 %
12.5 SYRINGE (ML) INTRAVENOUS ONCE
Refills: 0 | Status: DISCONTINUED | OUTPATIENT
Start: 2021-08-20 | End: 2021-08-21

## 2021-08-20 RX ORDER — ACETAMINOPHEN 500 MG
1000 TABLET ORAL ONCE
Refills: 0 | Status: COMPLETED | OUTPATIENT
Start: 2021-08-20 | End: 2021-08-20

## 2021-08-20 RX ORDER — INSULIN LISPRO 100/ML
VIAL (ML) SUBCUTANEOUS
Refills: 0 | Status: DISCONTINUED | OUTPATIENT
Start: 2021-08-20 | End: 2021-08-24

## 2021-08-20 RX ORDER — BUTORPHANOL TARTRATE 2 MG/ML
0.12 INJECTION, SOLUTION INTRAMUSCULAR; INTRAVENOUS EVERY 6 HOURS
Refills: 0 | Status: DISCONTINUED | OUTPATIENT
Start: 2021-08-20 | End: 2021-08-22

## 2021-08-20 RX ORDER — HYDROMORPHONE HYDROCHLORIDE 2 MG/ML
0.5 INJECTION INTRAMUSCULAR; INTRAVENOUS; SUBCUTANEOUS
Refills: 0 | Status: DISCONTINUED | OUTPATIENT
Start: 2021-08-20 | End: 2021-08-22

## 2021-08-20 RX ORDER — LOSARTAN POTASSIUM 100 MG/1
4 TABLET, FILM COATED ORAL
Qty: 0 | Refills: 0 | DISCHARGE

## 2021-08-20 RX ORDER — NALOXONE HYDROCHLORIDE 4 MG/.1ML
0.1 SPRAY NASAL
Refills: 0 | Status: DISCONTINUED | OUTPATIENT
Start: 2021-08-20 | End: 2021-08-23

## 2021-08-20 RX ORDER — METFORMIN HYDROCHLORIDE 850 MG/1
1 TABLET ORAL
Qty: 0 | Refills: 0 | DISCHARGE

## 2021-08-20 RX ORDER — FERROUS SULFATE 325(65) MG
0 TABLET ORAL
Qty: 0 | Refills: 0 | DISCHARGE

## 2021-08-20 RX ORDER — GABAPENTIN 400 MG/1
300 CAPSULE ORAL ONCE
Refills: 0 | Status: COMPLETED | OUTPATIENT
Start: 2021-08-20 | End: 2021-08-20

## 2021-08-20 RX ORDER — DEXTROSE 50 % IN WATER 50 %
15 SYRINGE (ML) INTRAVENOUS ONCE
Refills: 0 | Status: DISCONTINUED | OUTPATIENT
Start: 2021-08-20 | End: 2021-08-21

## 2021-08-20 RX ORDER — GLUCAGON INJECTION, SOLUTION 0.5 MG/.1ML
1 INJECTION, SOLUTION SUBCUTANEOUS ONCE
Refills: 0 | Status: DISCONTINUED | OUTPATIENT
Start: 2021-08-20 | End: 2021-08-21

## 2021-08-20 RX ORDER — PREGABALIN 225 MG/1
1 CAPSULE ORAL
Qty: 0 | Refills: 0 | DISCHARGE

## 2021-08-20 RX ORDER — METOCLOPRAMIDE HCL 10 MG
10 TABLET ORAL EVERY 8 HOURS
Refills: 0 | Status: DISCONTINUED | OUTPATIENT
Start: 2021-08-20 | End: 2021-08-20

## 2021-08-20 RX ORDER — DEXTROSE 50 % IN WATER 50 %
25 SYRINGE (ML) INTRAVENOUS ONCE
Refills: 0 | Status: DISCONTINUED | OUTPATIENT
Start: 2021-08-20 | End: 2021-08-21

## 2021-08-20 RX ADMIN — SODIUM CHLORIDE 30 MILLILITER(S): 9 INJECTION, SOLUTION INTRAVENOUS at 06:33

## 2021-08-20 RX ADMIN — Medication 10 MILLIGRAM(S): at 21:27

## 2021-08-20 RX ADMIN — Medication 1000 MILLIGRAM(S): at 18:00

## 2021-08-20 RX ADMIN — Medication 400 MILLIGRAM(S): at 17:30

## 2021-08-20 RX ADMIN — HEPARIN SODIUM 5000 UNIT(S): 5000 INJECTION INTRAVENOUS; SUBCUTANEOUS at 21:28

## 2021-08-20 RX ADMIN — HEPARIN SODIUM 5000 UNIT(S): 5000 INJECTION INTRAVENOUS; SUBCUTANEOUS at 06:52

## 2021-08-20 RX ADMIN — Medication 2: at 17:31

## 2021-08-20 RX ADMIN — ONDANSETRON 4 MILLIGRAM(S): 8 TABLET, FILM COATED ORAL at 17:00

## 2021-08-20 RX ADMIN — HYDROMORPHONE HYDROCHLORIDE 30 MILLILITER(S): 2 INJECTION INTRAMUSCULAR; INTRAVENOUS; SUBCUTANEOUS at 18:59

## 2021-08-20 RX ADMIN — GABAPENTIN 300 MILLIGRAM(S): 400 CAPSULE ORAL at 06:51

## 2021-08-20 RX ADMIN — HEPARIN SODIUM 5000 UNIT(S): 5000 INJECTION INTRAVENOUS; SUBCUTANEOUS at 14:19

## 2021-08-20 NOTE — PROGRESS NOTE ADULT - SUBJECTIVE AND OBJECTIVE BOX
JUAN MIGUEL GARCIA      56y   Female   [id number]         IV Contrast (Pruritus)             Daily Height in cm: 154.94 (20 Aug 2021 06:24)    Daily Drug Dosing Weight  Height (cm): 154.9 (20 Aug 2021 06:24)  Weight (kg): 68.5 (20 Aug 2021 06:24)  BMI (kg/m2): 28.5 (20 Aug 2021 06:24)  BSA (m2): 1.68 (20 Aug 2021 06:24)    HPI:  Pt is a 56 y.o. female ; recent  ct lung per pt "right lung nodule" Pt s/p Pet scan 6/2021  pt s/p bronchoscopy 7/02/2021 . P t to surgeon ; pt now presents for Robotic Assisted Bronchoscopy , Right Video Assisted Thoracoscopy , Right Upper Lobectomy     Pt denies cough, denies fever, denies chills , denies weight loss  (03 Aug 2021 19:27)    Procedure: Flex bronch R robotic-assisted VATS converted to open [injury to posterior segmental artery] RUL lobectomy w/MLND 8/20/2021                       Issues:              Lung nodule              Postop pain              Chest tube in place  HTN  DM-2              Home Medications:  ferrous sulfate 325 mg (65 mg elemental iron) oral tablet: orally once a day, AM (20 Aug 2021 06:41)  losartan 25 mg oral tablet: 4 tab(s) orally once a day, AM (20 Aug 2021 06:41)  metFORMIN 500 mg oral tablet: 1 tab(s) orally 2 times a day (20 Aug 2021 06:41)  multivitamin:   once daily, LD 8/13/21 (20 Aug 2021 06:41)  Vitamin B-12 1000 mcg oral tablet: 1 tab(s) orally once a day, AM (20 Aug 2021 06:41)    PAST MEDICAL & SURGICAL HISTORY:  Type 2 diabetes mellitus    Hypertension    S/P laparoscopic cholecystectomy      Vital Signs Last 24 Hrs  T(C): 35.3 (20 Aug 2021 13:00), Max: 36.3 (20 Aug 2021 06:24)  T(F): 95.5 (20 Aug 2021 13:00), Max: 97.3 (20 Aug 2021 06:24)  HR: 66 (20 Aug 2021 13:45) (66 - 77)  BP: 170/88 (20 Aug 2021 13:45) (142/87 - 170/88)  BP(mean): 109 (20 Aug 2021 13:45) (103 - 109)  RR: 19 (20 Aug 2021 13:45) (14 - 19)  SpO2: 100% (20 Aug 2021 13:45) (98% - 100%)  I&O's Detail    20 Aug 2021 07:01  -  20 Aug 2021 14:09  --------------------------------------------------------  IN:    Lactated Ringers: 60 mL  Total IN: 60 mL    OUT:    Chest Tube (mL): 0 mL    Chest Tube (mL): 0 mL    Indwelling Catheter - Urethral (mL): 150 mL  Total OUT: 150 mL    Total NET: -90 mL        CAPILLARY BLOOD GLUCOSE      POCT Blood Glucose.: 139 mg/dL (20 Aug 2021 06:07)    Home Medications:  ferrous sulfate 325 mg (65 mg elemental iron) oral tablet: orally once a day, AM (20 Aug 2021 06:41)  losartan 25 mg oral tablet: 4 tab(s) orally once a day, AM (20 Aug 2021 06:41)  metFORMIN 500 mg oral tablet: 1 tab(s) orally 2 times a day (20 Aug 2021 06:41)  multivitamin:   once daily, LD 8/13/21 (20 Aug 2021 06:41)  Vitamin B-12 1000 mcg oral tablet: 1 tab(s) orally once a day, AM (20 Aug 2021 06:41)    MEDICATIONS  (STANDING):  dextrose 40% Gel 15 Gram(s) Oral once  dextrose 5%. 1000 milliLiter(s) (50 mL/Hr) IV Continuous <Continuous>  dextrose 5%. 1000 milliLiter(s) (100 mL/Hr) IV Continuous <Continuous>  dextrose 50% Injectable 25 Gram(s) IV Push once  dextrose 50% Injectable 12.5 Gram(s) IV Push once  dextrose 50% Injectable 25 Gram(s) IV Push once  glucagon  Injectable 1 milliGRAM(s) IntraMuscular once  heparin   Injectable 5000 Unit(s) SubCutaneous every 8 hours  HYDROmorphone PCA (1 mG/mL) 30 milliLiter(s) PCA Continuous PCA Continuous  insulin lispro (ADMELOG) corrective regimen sliding scale   SubCutaneous three times a day before meals  lactated ringers. 1000 milliLiter(s) (30 mL/Hr) IV Continuous <Continuous>  multivitamin 1 Tablet(s) Oral daily    MEDICATIONS  (PRN):  butorphanol Injectable 0.125 milliGRAM(s) IV Push every 6 hours PRN Pruritus  HYDROmorphone PCA (1 mG/mL) Rescue Clinician Bolus 0.5 milliGRAM(s) IV Push every 15 minutes PRN for Pain Scale GREATER THAN 6  naloxone Injectable 0.1 milliGRAM(s) IV Push every 3 minutes PRN For ANY of the following changes in patient status:  A. RR LESS THAN 10 breaths per minute, B. Oxygen saturation LESS THAN 90%, C. Sedation score of 6  ondansetron Injectable 4 milliGRAM(s) IV Push every 6 hours PRN Nausea        Physical exam:                             General:               Pt is awake, alert,  appears to be in pain but not in  distress                                                  Neuro:                  Nonfocal                             Cardiovascular:   S1 & S2, regular / irregular                          Respiratory:         Air entry is fair and equal on both sides, has bilateral conducted sounds                           GI:                          Soft, nondistended and nontender, Bowel sounds active                            Ext:                        No cyanosis or edema     Labs:                                                                   CXR:  Postop changes, right chest tube in place.     Plan:  General: 56yFemale s/p Flex bronch R robotic-assisted VATS converted to open [injury to posterior segmental artery] RUL lobectomy w/MLND 8/20/2021, experiencing  pain with deep breathing.                             Neuro:                                         Pain control with PCA /  Tylenol PRN                            Cardiovascular:                                          HTN: Continue hemodynamic monitoring and restart Losartan                            Respiratory:                                         Pt is on 2L  nasal canula, wean off as tolerated                                          Comfortable, not in any distress.                                         Encourage incentive spirometry                                          Monitor chest tube output                                         Chest tube to suction                                                               Continue bronchodilators, pulmonary toilet                            GI                                         On  diabetic  diet as tolerated                                         Continue Zofran / Reglan for nausea - PRN	                                                                 Renal:                                         Continue LR  30cc/hr                                         Monitor I/Os and electrolytes                                                                                        Hem/ Onc:                                                                                  Monitor chest tube output &  signs of bleeding.                                          Follow CBC in AM                           Infectious disease:                                            Monitor for fever / leukocytosis.                                          All surgical incision / chest tube  sites look clean                            Endocrine                                             DM-2 / Hyperglycemia: Continue Accu-Checks with coverage. Hold Metformin    Pt is on SQ Heparin and Venodyne boots for DVT prophylaxis.     Pertinent clinical, laboratory, radiographic, hemodynamic, echocardiographic, respiratory data, microbiologic data and chart were reviewed and analyzed frequently throughout the course of the day and night  Patient seen, examined and plan discussed with CT Surgeon Dr. Barrios  / CTICU team during rounds.    OOB to chair and ambulate as tolerated.     Status discussed with patient /  updated plan of care          Kyler Alegre MD

## 2021-08-20 NOTE — ASU PREOP CHECKLIST - DENTURES
Post-Care Instructions: I reviewed with the patient in detail post-care instructions. Patient is to wear sunprotection, and avoid picking at any of the treated lesions. Pt may apply Vaseline to crusted or scabbing areas. Duration Of Freeze Thaw-Cycle (Seconds): 3 Render Post-Care Instructions In Note?: no Medical Necessity Information: It is in your best interest to select a reason for this procedure from the list below. All of these items fulfill various CMS LCD requirements except the new and changing color options. Consent: The patient's consent was obtained including but not limited to risks of crusting, scabbing, blistering, scarring, darker or lighter pigmentary change, recurrence, incomplete removal and infection. Medical Necessity Clause: This procedure was medically necessary because the lesions that were treated were: Detail Level: Detailed Number Of Freeze-Thaw Cycles: 2 freeze-thaw cycles no

## 2021-08-20 NOTE — BRIEF OPERATIVE NOTE - OPERATION/FINDINGS
56F never smoker, treated latent TB [2007], now w/biopsy-proven [NSCLC] [id number] RUL masss who is now s/p flex bronch R robotic-assisted VATS converted to open [injury to posterior segmental artery] RUL lobectomy w/MLND.     ***100cc blood loss

## 2021-08-21 LAB
ANION GAP SERPL CALC-SCNC: 14 MMOL/L — SIGNIFICANT CHANGE UP (ref 7–14)
APTT BLD: 32.9 SEC — SIGNIFICANT CHANGE UP (ref 27–36.3)
BUN SERPL-MCNC: 10 MG/DL — SIGNIFICANT CHANGE UP (ref 7–23)
CALCIUM SERPL-MCNC: 9.5 MG/DL — SIGNIFICANT CHANGE UP (ref 8.4–10.5)
CHLORIDE SERPL-SCNC: 101 MMOL/L — SIGNIFICANT CHANGE UP (ref 98–107)
CO2 SERPL-SCNC: 21 MMOL/L — LOW (ref 22–31)
CREAT SERPL-MCNC: 0.68 MG/DL — SIGNIFICANT CHANGE UP (ref 0.5–1.3)
CULTURE RESULTS: SIGNIFICANT CHANGE UP
GLUCOSE BLDC GLUCOMTR-MCNC: 123 MG/DL — HIGH (ref 70–99)
GLUCOSE BLDC GLUCOMTR-MCNC: 124 MG/DL — HIGH (ref 70–99)
GLUCOSE BLDC GLUCOMTR-MCNC: 127 MG/DL — HIGH (ref 70–99)
GLUCOSE BLDC GLUCOMTR-MCNC: 132 MG/DL — HIGH (ref 70–99)
GLUCOSE SERPL-MCNC: 136 MG/DL — HIGH (ref 70–99)
HCT VFR BLD CALC: 12.4 % — CRITICAL LOW (ref 34.5–45)
HCT VFR BLD CALC: 36.5 % — SIGNIFICANT CHANGE UP (ref 34.5–45)
HGB BLD-MCNC: 12.2 G/DL — SIGNIFICANT CHANGE UP (ref 11.5–15.5)
HGB BLD-MCNC: 4.1 G/DL — CRITICAL LOW (ref 11.5–15.5)
INR BLD: 2.12 RATIO — HIGH (ref 0.88–1.16)
MAGNESIUM SERPL-MCNC: 1.6 MG/DL — SIGNIFICANT CHANGE UP (ref 1.6–2.6)
MCHC RBC-ENTMCNC: 27.4 PG — SIGNIFICANT CHANGE UP (ref 27–34)
MCHC RBC-ENTMCNC: 27.5 PG — SIGNIFICANT CHANGE UP (ref 27–34)
MCHC RBC-ENTMCNC: 33.1 GM/DL — SIGNIFICANT CHANGE UP (ref 32–36)
MCHC RBC-ENTMCNC: 33.4 GM/DL — SIGNIFICANT CHANGE UP (ref 32–36)
MCV RBC AUTO: 81.8 FL — SIGNIFICANT CHANGE UP (ref 80–100)
MCV RBC AUTO: 83.2 FL — SIGNIFICANT CHANGE UP (ref 80–100)
NRBC # BLD: 0 /100 WBCS — SIGNIFICANT CHANGE UP
NRBC # BLD: 0 /100 WBCS — SIGNIFICANT CHANGE UP
NRBC # FLD: 0 K/UL — SIGNIFICANT CHANGE UP
PHOSPHATE SERPL-MCNC: 2.5 MG/DL — SIGNIFICANT CHANGE UP (ref 2.5–4.5)
PHOSPHATE SERPL-MCNC: 4.6 MG/DL — HIGH (ref 2.5–4.5)
PLATELET # BLD AUTO: 233 K/UL — SIGNIFICANT CHANGE UP (ref 150–400)
PLATELET # BLD AUTO: 78 K/UL — LOW (ref 150–400)
POTASSIUM SERPL-MCNC: 4.3 MMOL/L — SIGNIFICANT CHANGE UP (ref 3.5–5.3)
POTASSIUM SERPL-SCNC: 4.3 MMOL/L — SIGNIFICANT CHANGE UP (ref 3.5–5.3)
PROTHROM AB SERPL-ACNC: 23.5 SEC — HIGH (ref 10.6–13.6)
RBC # BLD: 1.49 M/UL — LOW (ref 3.8–5.2)
RBC # BLD: 4.46 M/UL — SIGNIFICANT CHANGE UP (ref 3.8–5.2)
RBC # FLD: 13.4 % — SIGNIFICANT CHANGE UP (ref 10.3–14.5)
RBC # FLD: 13.6 % — SIGNIFICANT CHANGE UP (ref 10.3–14.5)
SODIUM SERPL-SCNC: 136 MMOL/L — SIGNIFICANT CHANGE UP (ref 135–145)
SPECIMEN SOURCE: SIGNIFICANT CHANGE UP
WBC # BLD: 10.27 K/UL — SIGNIFICANT CHANGE UP (ref 3.8–10.5)
WBC # BLD: 4.01 K/UL — SIGNIFICANT CHANGE UP (ref 3.8–10.5)
WBC # FLD AUTO: 10.27 K/UL — SIGNIFICANT CHANGE UP (ref 3.8–10.5)
WBC # FLD AUTO: 4.01 K/UL — SIGNIFICANT CHANGE UP (ref 3.8–10.5)

## 2021-08-21 PROCEDURE — 71045 X-RAY EXAM CHEST 1 VIEW: CPT | Mod: 26

## 2021-08-21 PROCEDURE — 99233 SBSQ HOSP IP/OBS HIGH 50: CPT

## 2021-08-21 PROCEDURE — 71045 X-RAY EXAM CHEST 1 VIEW: CPT | Mod: 26,77

## 2021-08-21 RX ORDER — SENNA PLUS 8.6 MG/1
2 TABLET ORAL AT BEDTIME
Refills: 0 | Status: DISCONTINUED | OUTPATIENT
Start: 2021-08-21 | End: 2021-08-24

## 2021-08-21 RX ORDER — SIMETHICONE 80 MG/1
80 TABLET, CHEWABLE ORAL EVERY 8 HOURS
Refills: 0 | Status: DISCONTINUED | OUTPATIENT
Start: 2021-08-21 | End: 2021-08-24

## 2021-08-21 RX ORDER — KETOROLAC TROMETHAMINE 30 MG/ML
15 SYRINGE (ML) INJECTION EVERY 8 HOURS
Refills: 0 | Status: DISCONTINUED | OUTPATIENT
Start: 2021-08-21 | End: 2021-08-23

## 2021-08-21 RX ORDER — LIDOCAINE 4 G/100G
1 CREAM TOPICAL EVERY 24 HOURS
Refills: 0 | Status: DISCONTINUED | OUTPATIENT
Start: 2021-08-21 | End: 2021-08-24

## 2021-08-21 RX ORDER — LOSARTAN POTASSIUM 100 MG/1
25 TABLET, FILM COATED ORAL DAILY
Refills: 0 | Status: DISCONTINUED | OUTPATIENT
Start: 2021-08-21 | End: 2021-08-24

## 2021-08-21 RX ORDER — KETOROLAC TROMETHAMINE 30 MG/ML
15 SYRINGE (ML) INJECTION EVERY 8 HOURS
Refills: 0 | Status: DISCONTINUED | OUTPATIENT
Start: 2021-08-21 | End: 2021-08-21

## 2021-08-21 RX ORDER — POLYETHYLENE GLYCOL 3350 17 G/17G
17 POWDER, FOR SOLUTION ORAL DAILY
Refills: 0 | Status: DISCONTINUED | OUTPATIENT
Start: 2021-08-22 | End: 2021-08-24

## 2021-08-21 RX ORDER — ACETAMINOPHEN 500 MG
1000 TABLET ORAL ONCE
Refills: 0 | Status: COMPLETED | OUTPATIENT
Start: 2021-08-21 | End: 2021-08-21

## 2021-08-21 RX ADMIN — SIMETHICONE 80 MILLIGRAM(S): 80 TABLET, CHEWABLE ORAL at 14:43

## 2021-08-21 RX ADMIN — Medication 1 TABLET(S): at 12:05

## 2021-08-21 RX ADMIN — Medication 400 MILLIGRAM(S): at 19:39

## 2021-08-21 RX ADMIN — HYDROMORPHONE HYDROCHLORIDE 0.5 MILLIGRAM(S): 2 INJECTION INTRAMUSCULAR; INTRAVENOUS; SUBCUTANEOUS at 19:25

## 2021-08-21 RX ADMIN — Medication 400 MILLIGRAM(S): at 04:00

## 2021-08-21 RX ADMIN — Medication 1000 MILLIGRAM(S): at 12:25

## 2021-08-21 RX ADMIN — Medication 1000 MILLIGRAM(S): at 04:15

## 2021-08-21 RX ADMIN — HEPARIN SODIUM 5000 UNIT(S): 5000 INJECTION INTRAVENOUS; SUBCUTANEOUS at 14:43

## 2021-08-21 RX ADMIN — SENNA PLUS 2 TABLET(S): 8.6 TABLET ORAL at 22:53

## 2021-08-21 RX ADMIN — LIDOCAINE 1 PATCH: 4 CREAM TOPICAL at 20:31

## 2021-08-21 RX ADMIN — HEPARIN SODIUM 5000 UNIT(S): 5000 INJECTION INTRAVENOUS; SUBCUTANEOUS at 22:52

## 2021-08-21 RX ADMIN — LIDOCAINE 1 PATCH: 4 CREAM TOPICAL at 11:13

## 2021-08-21 RX ADMIN — Medication 15 MILLIGRAM(S): at 11:14

## 2021-08-21 RX ADMIN — Medication 400 MILLIGRAM(S): at 12:15

## 2021-08-21 RX ADMIN — LOSARTAN POTASSIUM 25 MILLIGRAM(S): 100 TABLET, FILM COATED ORAL at 11:45

## 2021-08-21 RX ADMIN — Medication 1000 MILLIGRAM(S): at 20:13

## 2021-08-21 RX ADMIN — HEPARIN SODIUM 5000 UNIT(S): 5000 INJECTION INTRAVENOUS; SUBCUTANEOUS at 06:46

## 2021-08-21 NOTE — PROGRESS NOTE ADULT - SUBJECTIVE AND OBJECTIVE BOX
Anesthesia Pain Management Service    SUBJECTIVE: Patient states her pain is managed with the IV PCA.   Pain Scale Score	At rest: __5/10_ 	With Activity: ___ 	[X ] Refer to charted pain scores    THERAPY:    [ ] IV PCA Morphine		[ ] 5 mg/mL	[ ] 1 mg/mL  [X ] IV PCA Hydromorphone	[ ] 5 mg/mL	[X ] 1 mg/mL  [ ] IV PCA Fentanyl		[ ] 50 micrograms/mL    Demand dose __0.2_ lockout __6_ (minutes) Continuous Rate _0__ Total: _5__  mg used (in past 24 hours)      MEDICATIONS  (STANDING):  dextrose 50% Injectable 25 Gram(s) IV Push once  heparin   Injectable 5000 Unit(s) SubCutaneous every 8 hours  HYDROmorphone PCA (1 mG/mL) 30 milliLiter(s) PCA Continuous PCA Continuous  insulin lispro (ADMELOG) corrective regimen sliding scale   SubCutaneous three times a day before meals  lactated ringers. 1000 milliLiter(s) (30 mL/Hr) IV Continuous <Continuous>  lidocaine   5% Patch 1 Patch Transdermal every 24 hours  losartan 25 milliGRAM(s) Oral daily  multivitamin 1 Tablet(s) Oral daily    MEDICATIONS  (PRN):  acetaminophen  IVPB .. 1000 milliGRAM(s) IV Intermittent once PRN Temp greater or equal to 38C (100.4F), Mild Pain (1 - 3)  butorphanol Injectable 0.125 milliGRAM(s) IV Push every 6 hours PRN Pruritus  HYDROmorphone PCA (1 mG/mL) Rescue Clinician Bolus 0.5 milliGRAM(s) IV Push every 15 minutes PRN for Pain Scale GREATER THAN 6  ketorolac   Injectable 15 milliGRAM(s) IV Push every 8 hours PRN Severe Pain (7 - 10)  metoclopramide Injectable 10 milliGRAM(s) IV Push every 8 hours PRN Nausea / Vomiting  naloxone Injectable 0.1 milliGRAM(s) IV Push every 3 minutes PRN For ANY of the following changes in patient status:  A. RR LESS THAN 10 breaths per minute, B. Oxygen saturation LESS THAN 90%, C. Sedation score of 6  ondansetron Injectable 4 milliGRAM(s) IV Push every 6 hours PRN Nausea      OBJECTIVE: Patient sitting up in chair. CTX2 in place.    Sedation Score:	[ X] Alert	[ ] Drowsy 	[ ] Arousable	[ ] Asleep	[ ] Unresponsive    Side Effects:	[X ] None	[ ] Nausea	[ ] Vomiting	[ ] Pruritus  		[ ] Other:    Vital Signs Last 24 Hrs  T(C): 36.9 (21 Aug 2021 07:22), Max: 36.9 (21 Aug 2021 07:22)  T(F): 98.4 (21 Aug 2021 07:22), Max: 98.4 (21 Aug 2021 07:22)  HR: 92 (21 Aug 2021 07:58) (64 - 93)  BP: 149/86 (21 Aug 2021 07:00) (126/80 - 170/88)  BP(mean): 102 (21 Aug 2021 07:00) (94 - 109)  RR: 18 (21 Aug 2021 07:58) (11 - 24)  SpO2: 100% (21 Aug 2021 07:58) (93% - 100%)    ASSESSMENT/ PLAN    Therapy to  be:	[ X] Continue   [ ] Discontinued   [ ] Change to prn Analgesics    Documentation and Verification of current medications:   [X] Done	[ ] Not done, not elligible    Comments: Continue IV PCA. Recommend non-opioid adjuvant analgesics to be used when possible and when allowed by primary surgical team.    Progress Note written now but Patient was seen earlier.

## 2021-08-21 NOTE — PROGRESS NOTE ADULT - SUBJECTIVE AND OBJECTIVE BOX
CHIEF COMPLAINT: FOLLOW UP IN ICU FOR POSTOPERATIVE CARE OF PATIENT WHO IS S/P THORACOTOMY RUL LOBECTOMY      PROCEDURES: R robotic-assisted VATS converted to open thoracotomy [injury to posterior segmental artery] RUL lobectomy w/MLND 20-Aug-2021       ISSUES:   Lung nodule  Post op pain  Chest tube in place  HTN  DM2      INTERVAL EVENTS:   Chest tube with airleak. one chest tube migrated to skin without airleak. One chest tube to be removed.      HISTORY:   Patient reports moderate pain at chest wall incision sites which is worse with coughing and deep breathing without associated fever or dyspnea. Pain is improved with use of pain meds.     PHYSICAL EXAM:   Gen: Comfortable, No acute distress  Eyes: Sclera white, Conjunctiva normal, Eyelids normal, Pupils symmetrical   ENT: Mucous membranes moist,  ,  ,    Neck: Trachea midline,  ,  ,  ,  ,    CV: Rate regular, Rhythm regular,  ,  ,    Resp: Breath sounds clear, No accessory muscles use, Two chest tubes,  ,    Abd: Soft, Non-distended, Non-tender, Bowel sounds normal,  ,  ,    Skin: Warm, No peripheral edema of lower extremities,  ,    : No beckman  Neuro: Moving all 4 extremities,    Psych: A&Ox3      ASSESSMENT AND PLAN:     NEURO:  Post-operative Pain - Stable. Pain control with PCA and Tylenol IV PRN.      RESPIRATORY:  Hypoxia - Wean nasal cannula for goal O2sat above 92. Obtain CXR. Incentive spirometry. Chest PT and frequent suctioning. Continue bronchodilators. OOB to chair & ambulate w/ assistance. Continuous pulse oximetry for support & to prevent decompensation.       Chest tube – Pleurevac regulated suctioning. Monitor chest tube output.      CARDIOVASCULAR:  Hemodynamically stable - Not on pressors. Continue hemodynamic monitoring.  Telemetry (medical test) - Reviewed by me today independently. Normal sinus rhythm.  HTN - stable. Continue home antihypertensives medications.    RENAL:  Stable - Monitor IOs and electrolytes. Keep K above 4.0 and Mg above 2.0.         GASTROINTESTINAL:  GI prophylaxis not indicated  Zofran and Reglan IV PRN for nausea  Regular consistency diet             HEMATOLOGIC:  No signs of active bleeding. Monitor Hgb in CBC in AM  DVT prophylaxis with heparin subQ and SCDs.           INFECTIOUS DISEASE:  All surgical sites appear clean. Will monitor for fever and leukocytosis.       ENDOCRINE:  DM2 – Stable. Monitor glucose fingersticks for goal 120-180. Insulin sliding scale. Carb control diet.            ONCOLOGY:  Lung nodule - Improved. S/P resection. Follow up final pathology.      Pertinent clinical, laboratory, radiographic, hemodynamic, echocardiographic, respiratory data, microbiologic data and chart were reviewed by myself and analyzed frequently throughout the course of the day and night by myself.    Plan discussed at length with the CTICU staff and Attending CT Surgeon -   Dr Bel Aguirre.       Patient's status was discussed with patient at bedside.           ________________________________________________      _________________________  VITAL SIGNS:  Vital Signs Last 24 Hrs  T(C): 36.9 (21 Aug 2021 07:22), Max: 36.9 (21 Aug 2021 07:22)  T(F): 98.4 (21 Aug 2021 07:22), Max: 98.4 (21 Aug 2021 07:22)  HR: 84 (21 Aug 2021 11:00) (64 - 93)  BP: 142/84 (21 Aug 2021 11:00) (126/80 - 170/88)  BP(mean): 101 (21 Aug 2021 11:00) (94 - 109)  RR: 16 (21 Aug 2021 11:00) (11 - 24)  SpO2: 97% (21 Aug 2021 11:00) (93% - 100%)  I/Os:   I&O's Detail    20 Aug 2021 07:01  -  21 Aug 2021 07:00  --------------------------------------------------------  IN:    IV PiggyBack: 100 mL    Lactated Ringers: 570 mL    Oral Fluid: 150 mL  Total IN: 820 mL    OUT:    Chest Tube (mL): 15 mL    Chest Tube (mL): 240 mL    Indwelling Catheter - Urethral (mL): 1165 mL  Total OUT: 1420 mL    Total NET: -600 mL      21 Aug 2021 07:01  -  21 Aug 2021 12:01  --------------------------------------------------------  IN:    Lactated Ringers: 120 mL  Total IN: 120 mL    OUT:    Indwelling Catheter - Urethral (mL): 95 mL  Total OUT: 95 mL    Total NET: 25 mL              MEDICATIONS:  MEDICATIONS  (STANDING):  dextrose 50% Injectable 25 Gram(s) IV Push once  heparin   Injectable 5000 Unit(s) SubCutaneous every 8 hours  HYDROmorphone PCA (1 mG/mL) 30 milliLiter(s) PCA Continuous PCA Continuous  insulin lispro (ADMELOG) corrective regimen sliding scale   SubCutaneous three times a day before meals  lactated ringers. 1000 milliLiter(s) (30 mL/Hr) IV Continuous <Continuous>  lidocaine   5% Patch 1 Patch Transdermal every 24 hours  losartan 25 milliGRAM(s) Oral daily  multivitamin 1 Tablet(s) Oral daily    MEDICATIONS  (PRN):  acetaminophen  IVPB .. 1000 milliGRAM(s) IV Intermittent once PRN Temp greater or equal to 38C (100.4F), Mild Pain (1 - 3)  butorphanol Injectable 0.125 milliGRAM(s) IV Push every 6 hours PRN Pruritus  HYDROmorphone PCA (1 mG/mL) Rescue Clinician Bolus 0.5 milliGRAM(s) IV Push every 15 minutes PRN for Pain Scale GREATER THAN 6  ketorolac   Injectable 15 milliGRAM(s) IV Push every 8 hours PRN Severe Pain (7 - 10)  metoclopramide Injectable 10 milliGRAM(s) IV Push every 8 hours PRN Nausea / Vomiting  naloxone Injectable 0.1 milliGRAM(s) IV Push every 3 minutes PRN For ANY of the following changes in patient status:  A. RR LESS THAN 10 breaths per minute, B. Oxygen saturation LESS THAN 90%, C. Sedation score of 6  ondansetron Injectable 4 milliGRAM(s) IV Push every 6 hours PRN Nausea      LABS:  Laboratory data was independently reviewed by me today.                           12.2   10.27 )-----------( 233      ( 21 Aug 2021 06:42 )             36.5     08-21    136  |  101  |  10  ----------------------------<  136<H>  4.3   |  21<L>  |  0.68    Ca    9.5      21 Aug 2021 06:42  Phos  4.6     08-21  Mg     1.60     08-21        PT/INR - ( 21 Aug 2021 05:09 )   PT: 23.5 sec;   INR: 2.12 ratio         PTT - ( 21 Aug 2021 05:09 )  PTT:32.9 sec  ABG - ( 20 Aug 2021 10:40 )  pH, Arterial: 7.27  pH, Blood: x     /  pCO2: 50    /  pO2: 342   / HCO3: 23    / Base Excess: -4.2  /  SaO2: 98.8                  RADIOLOGY:   Radiology images were independently reviewed by me today. Reports were reviewed by me today.    Xray Chest 1 View- PORTABLE-Urgent:   EXAM:  XR CHEST PORTABLE URGENT 1V        PROCEDURE DATE:  Aug 20 2021         INTERPRETATION:  Chest one view    HISTORY: Postop thoracotomy    COMPARISON STUDY: 7/2/2021    Frontal expiratory view of the chest shows the heart to be similar in size. Two right chest tubes are present. The sidehole of one of the tubes is near the rib margin.    The lungs show mild left atelectasis and there is no evidence of pneumothorax nor pleural effusion.Right upper quadrant clips are again identified.    IMPRESSION:  Mild left atelectasis. Right chest tube as noted.      Thank you for the courtesy of this referral.    --- End of Report ---              KEL DILLON MD; Attending Interventional Radiologist  This document has been electronically signed.Aug 21 2021 11:49AM (08-20-21 @ 13:54)

## 2021-08-22 LAB
ANION GAP SERPL CALC-SCNC: 14 MMOL/L — SIGNIFICANT CHANGE UP (ref 7–14)
BUN SERPL-MCNC: 11 MG/DL — SIGNIFICANT CHANGE UP (ref 7–23)
CALCIUM SERPL-MCNC: 9.3 MG/DL — SIGNIFICANT CHANGE UP (ref 8.4–10.5)
CHLORIDE SERPL-SCNC: 101 MMOL/L — SIGNIFICANT CHANGE UP (ref 98–107)
CO2 SERPL-SCNC: 20 MMOL/L — LOW (ref 22–31)
CREAT SERPL-MCNC: 0.66 MG/DL — SIGNIFICANT CHANGE UP (ref 0.5–1.3)
GLUCOSE BLDC GLUCOMTR-MCNC: 112 MG/DL — HIGH (ref 70–99)
GLUCOSE BLDC GLUCOMTR-MCNC: 120 MG/DL — HIGH (ref 70–99)
GLUCOSE BLDC GLUCOMTR-MCNC: 134 MG/DL — HIGH (ref 70–99)
GLUCOSE SERPL-MCNC: 120 MG/DL — HIGH (ref 70–99)
HCT VFR BLD CALC: 35.1 % — SIGNIFICANT CHANGE UP (ref 34.5–45)
HGB BLD-MCNC: 11.5 G/DL — SIGNIFICANT CHANGE UP (ref 11.5–15.5)
MAGNESIUM SERPL-MCNC: 1.8 MG/DL — SIGNIFICANT CHANGE UP (ref 1.6–2.6)
MCHC RBC-ENTMCNC: 27.3 PG — SIGNIFICANT CHANGE UP (ref 27–34)
MCHC RBC-ENTMCNC: 32.8 GM/DL — SIGNIFICANT CHANGE UP (ref 32–36)
MCV RBC AUTO: 83.4 FL — SIGNIFICANT CHANGE UP (ref 80–100)
NRBC # BLD: 0 /100 WBCS — SIGNIFICANT CHANGE UP
NRBC # FLD: 0 K/UL — SIGNIFICANT CHANGE UP
PLATELET # BLD AUTO: 230 K/UL — SIGNIFICANT CHANGE UP (ref 150–400)
POTASSIUM SERPL-MCNC: 4.1 MMOL/L — SIGNIFICANT CHANGE UP (ref 3.5–5.3)
POTASSIUM SERPL-SCNC: 4.1 MMOL/L — SIGNIFICANT CHANGE UP (ref 3.5–5.3)
RBC # BLD: 4.21 M/UL — SIGNIFICANT CHANGE UP (ref 3.8–5.2)
RBC # FLD: 14 % — SIGNIFICANT CHANGE UP (ref 10.3–14.5)
SODIUM SERPL-SCNC: 135 MMOL/L — SIGNIFICANT CHANGE UP (ref 135–145)
WBC # BLD: 8.8 K/UL — SIGNIFICANT CHANGE UP (ref 3.8–10.5)
WBC # FLD AUTO: 8.8 K/UL — SIGNIFICANT CHANGE UP (ref 3.8–10.5)

## 2021-08-22 PROCEDURE — 71045 X-RAY EXAM CHEST 1 VIEW: CPT | Mod: 26

## 2021-08-22 PROCEDURE — 99233 SBSQ HOSP IP/OBS HIGH 50: CPT

## 2021-08-22 RX ORDER — MAGNESIUM SULFATE 500 MG/ML
2 VIAL (ML) INJECTION ONCE
Refills: 0 | Status: COMPLETED | OUTPATIENT
Start: 2021-08-22 | End: 2021-08-22

## 2021-08-22 RX ORDER — SODIUM CHLORIDE 0.65 %
1 AEROSOL, SPRAY (ML) NASAL EVERY 6 HOURS
Refills: 0 | Status: DISCONTINUED | OUTPATIENT
Start: 2021-08-22 | End: 2021-08-24

## 2021-08-22 RX ORDER — ACETAMINOPHEN 500 MG
1000 TABLET ORAL ONCE
Refills: 0 | Status: COMPLETED | OUTPATIENT
Start: 2021-08-22 | End: 2021-08-22

## 2021-08-22 RX ORDER — ACETAMINOPHEN 500 MG
1000 TABLET ORAL ONCE
Refills: 0 | Status: DISCONTINUED | OUTPATIENT
Start: 2021-08-22 | End: 2021-08-24

## 2021-08-22 RX ORDER — HYDROMORPHONE HYDROCHLORIDE 2 MG/ML
30 INJECTION INTRAMUSCULAR; INTRAVENOUS; SUBCUTANEOUS
Refills: 0 | Status: DISCONTINUED | OUTPATIENT
Start: 2021-08-22 | End: 2021-08-23

## 2021-08-22 RX ORDER — DIPHENHYDRAMINE HCL 50 MG
25 CAPSULE ORAL EVERY 6 HOURS
Refills: 0 | Status: DISCONTINUED | OUTPATIENT
Start: 2021-08-22 | End: 2021-08-24

## 2021-08-22 RX ADMIN — POLYETHYLENE GLYCOL 3350 17 GRAM(S): 17 POWDER, FOR SOLUTION ORAL at 13:00

## 2021-08-22 RX ADMIN — Medication 400 MILLIGRAM(S): at 18:03

## 2021-08-22 RX ADMIN — Medication 1 TABLET(S): at 13:00

## 2021-08-22 RX ADMIN — Medication 15 MILLIGRAM(S): at 09:15

## 2021-08-22 RX ADMIN — Medication 400 MILLIGRAM(S): at 10:55

## 2021-08-22 RX ADMIN — HEPARIN SODIUM 5000 UNIT(S): 5000 INJECTION INTRAVENOUS; SUBCUTANEOUS at 05:30

## 2021-08-22 RX ADMIN — LIDOCAINE 1 PATCH: 4 CREAM TOPICAL at 12:59

## 2021-08-22 RX ADMIN — Medication 50 GRAM(S): at 08:46

## 2021-08-22 RX ADMIN — SENNA PLUS 2 TABLET(S): 8.6 TABLET ORAL at 21:15

## 2021-08-22 RX ADMIN — Medication 1000 MILLIGRAM(S): at 18:41

## 2021-08-22 RX ADMIN — HYDROMORPHONE HYDROCHLORIDE 30 MILLILITER(S): 2 INJECTION INTRAMUSCULAR; INTRAVENOUS; SUBCUTANEOUS at 18:51

## 2021-08-22 RX ADMIN — BUTORPHANOL TARTRATE 0.12 MILLIGRAM(S): 2 INJECTION, SOLUTION INTRAMUSCULAR; INTRAVENOUS at 11:00

## 2021-08-22 RX ADMIN — Medication 1000 MILLIGRAM(S): at 11:30

## 2021-08-22 RX ADMIN — HYDROMORPHONE HYDROCHLORIDE 30 MILLILITER(S): 2 INJECTION INTRAMUSCULAR; INTRAVENOUS; SUBCUTANEOUS at 07:24

## 2021-08-22 RX ADMIN — ONDANSETRON 4 MILLIGRAM(S): 8 TABLET, FILM COATED ORAL at 11:53

## 2021-08-22 RX ADMIN — Medication 400 MILLIGRAM(S): at 04:01

## 2021-08-22 RX ADMIN — HEPARIN SODIUM 5000 UNIT(S): 5000 INJECTION INTRAVENOUS; SUBCUTANEOUS at 13:57

## 2021-08-22 RX ADMIN — LOSARTAN POTASSIUM 25 MILLIGRAM(S): 100 TABLET, FILM COATED ORAL at 05:30

## 2021-08-22 RX ADMIN — BUTORPHANOL TARTRATE 0.12 MILLIGRAM(S): 2 INJECTION, SOLUTION INTRAMUSCULAR; INTRAVENOUS at 10:31

## 2021-08-22 RX ADMIN — Medication 1000 MILLIGRAM(S): at 04:30

## 2021-08-22 RX ADMIN — Medication 15 MILLIGRAM(S): at 08:45

## 2021-08-22 RX ADMIN — LIDOCAINE 1 PATCH: 4 CREAM TOPICAL at 00:28

## 2021-08-22 RX ADMIN — HEPARIN SODIUM 5000 UNIT(S): 5000 INJECTION INTRAVENOUS; SUBCUTANEOUS at 21:15

## 2021-08-22 RX ADMIN — LIDOCAINE 1 PATCH: 4 CREAM TOPICAL at 19:47

## 2021-08-22 NOTE — PROGRESS NOTE ADULT - SUBJECTIVE AND OBJECTIVE BOX
JUAN MIGUEL GARCIA                     [id number]    HPI:  Pt is a 56 y.o. female ; recent  ct lung per pt "right lung nodule" Pt s/p Pet scan 6/2021  pt s/p bronchoscopy 7/02/2021 . P t to surgeon ; pt now presents for Robotic Assisted Bronchoscopy , Right Video Assisted Thoracoscopy , Right Upper Lobectomy     PROCEDURES: R robotic-assisted VATS converted to open thoracotomy [injury to posterior segmental artery] RUL lobectomy w/MLND 20-Aug-2021       ISSUES:   Lung nodule  Post op pain  Chest tube in place  HTN  DM2      PAST MEDICAL & SURGICAL HISTORY:  Type 2 diabetes mellitus    Hypertension    S/P laparoscopic cholecystectomy              VITAL SIGNS:  Vital Signs Last 24 Hrs  T(C): 36.9 (22 Aug 2021 08:00), Max: 37.1 (21 Aug 2021 16:00)  T(F): 98.5 (22 Aug 2021 08:00), Max: 98.8 (21 Aug 2021 16:00)  HR: 101 (22 Aug 2021 08:00) (80 - 101)  BP: 130/91 (22 Aug 2021 08:00) (119/76 - 163/89)  BP(mean): 100 (22 Aug 2021 08:00) (80 - 114)  RR: 20 (22 Aug 2021 08:00) (10 - 27)  SpO2: 98% (22 Aug 2021 08:00) (91% - 100%)    I/Os:   I&O's Detail    21 Aug 2021 07:01  -  22 Aug 2021 07:00  --------------------------------------------------------  IN:    Lactated Ringers: 720 mL  Total IN: 720 mL    OUT:    Chest Tube (mL): 170 mL    Indwelling Catheter - Urethral (mL): 1495 mL  Total OUT: 1665 mL    Total NET: -945 mL      22 Aug 2021 07:01  -  22 Aug 2021 09:31  --------------------------------------------------------  IN:    IV PiggyBack: 100 mL    Lactated Ringers: 60 mL  Total IN: 160 mL    OUT:    Chest Tube (mL): 120 mL    Voided (mL): 50 mL  Total OUT: 170 mL    Total NET: -10 mL          CAPILLARY BLOOD GLUCOSE      POCT Blood Glucose.: 112 mg/dL (22 Aug 2021 07:47)  POCT Blood Glucose.: 132 mg/dL (21 Aug 2021 22:50)  POCT Blood Glucose.: 127 mg/dL (21 Aug 2021 17:19)  POCT Blood Glucose.: 123 mg/dL (21 Aug 2021 12:07)      =======================MEDICATIONS===================  MEDICATIONS  (STANDING):  acetaminophen  IVPB .. 1000 milliGRAM(s) IV Intermittent once  dextrose 50% Injectable 25 Gram(s) IV Push once  heparin   Injectable 5000 Unit(s) SubCutaneous every 8 hours  HYDROmorphone PCA (1 mG/mL) 30 milliLiter(s) PCA Continuous PCA Continuous  insulin lispro (ADMELOG) corrective regimen sliding scale   SubCutaneous three times a day before meals  lactated ringers. 1000 milliLiter(s) (30 mL/Hr) IV Continuous <Continuous>  lidocaine   5% Patch 1 Patch Transdermal every 24 hours  losartan 25 milliGRAM(s) Oral daily  multivitamin 1 Tablet(s) Oral daily  polyethylene glycol 3350 17 Gram(s) Oral daily  senna 2 Tablet(s) Oral at bedtime    MEDICATIONS  (PRN):  butorphanol Injectable 0.125 milliGRAM(s) IV Push every 6 hours PRN Pruritus  HYDROmorphone PCA (1 mG/mL) Rescue Clinician Bolus 0.5 milliGRAM(s) IV Push every 15 minutes PRN for Pain Scale GREATER THAN 6  ketorolac   Injectable 15 milliGRAM(s) IV Push every 8 hours PRN Severe Pain (7 - 10)  metoclopramide Injectable 10 milliGRAM(s) IV Push every 8 hours PRN Nausea / Vomiting  naloxone Injectable 0.1 milliGRAM(s) IV Push every 3 minutes PRN For ANY of the following changes in patient status:  A. RR LESS THAN 10 breaths per minute, B. Oxygen saturation LESS THAN 90%, C. Sedation score of 6  ondansetron Injectable 4 milliGRAM(s) IV Push every 6 hours PRN Nausea  simethicone 80 milliGRAM(s) Chew every 8 hours PRN Gas        PHYSICAL EXAM============================  General:                         Awake, alert, not in any distress  Neuro:                            Moving all extremities to commands.   Respiratory:	Air entry fair and  bilateral conducted sounds                                           Effort even and unlabored.  CV:		Regular rate and rhythm. Normal S1/S2                                          Distal pulses present.  Abdomen:	                     Soft, non-distended. Bowel sounds present   Skin:		No rash.  Extremities:	Warm, no cyanosis or edema.  Palpable pulses    ============================LABS=========================                        11.5   8.80  )-----------( 230      ( 22 Aug 2021 03:43 )             35.1     08-22    135  |  101  |  11  ----------------------------<  120<H>  4.1   |  20<L>  |  0.66    Ca    9.3      22 Aug 2021 03:43  Phos  4.6     08-21  Mg     1.80     08-22        PT/INR - ( 21 Aug 2021 05:09 )   PT: 23.5 sec;   INR: 2.12 ratio         PTT - ( 21 Aug 2021 05:09 )  PTT:32.9 sec  ABG - ( 20 Aug 2021 10:40 )  pH, Arterial: 7.27  pH, Blood: x     /  pCO2: 50    /  pO2: 342   / HCO3: 23    / Base Excess: -4.2  /  SaO2: 98.8          ASSESSMENT AND PLAN:     NEURO:  Post-operative Pain - Stable. Pain control with PCA and Tylenol IV PRN.    RESPIRATORY:  Hypoxia - Wean nasal cannula for goal O2sat above 92. Obtain CXR. Incentive spirometry. Chest PT and frequent suctioning. Continue bronchodilators. OOB to chair & ambulate w/ assistance. Continuous pulse oximetry for support & to prevent decompensation.  Chest tube – Pleurevac regulated suctioning. Monitor chest tube output.      CARDIOVASCULAR:  Hemodynamically stable - Not on pressors. Continue hemodynamic monitoring.  Telemetry (medical test) - Reviewed by me today independently. Normal sinus rhythm.  HTN - stable. Continue home antihypertensives medications.    RENAL:  Stable - Monitor IOs and electrolytes. Keep K above 4.0 and Mg above 2.0.     GASTROINTESTINAL:  GI prophylaxis not indicated  Zofran and Reglan IV PRN for nausea  Regular consistency diet          HEMATOLOGIC:  No signs of active bleeding. Monitor Hgb in CBC in AM  DVT prophylaxis with heparin subQ and SCDs.     INFECTIOUS DISEASE:  All surgical sites appear clean. Will monitor for fever and leukocytosis.       ENDOCRINE:  DM2 – Stable. Monitor glucose fingersticks for goal 120-180. Insulin sliding scale. Carb control diet.    ONCOLOGY:  Lung nodule - Improved. S/P resection. Follow up final pathology.      Pertinent clinical, laboratory, radiographic, hemodynamic, echocardiographic, respiratory data, microbiologic data and chart were reviewed by myself and analyzed frequently throughout the course of the day and night by myself.    Plan discussed at length with the CTICU staff and Attending CT Surgeon -   Dr Bel Aguirre.       Patient's status was discussed with patient at bedside    Reynold Harrell DO, FACEP

## 2021-08-22 NOTE — PROGRESS NOTE ADULT - SUBJECTIVE AND OBJECTIVE BOX
Anesthesia Pain Management Service    SUBJECTIVE: Patient states the IV PCA helps her but causes her to be itchy and dizzy.     Pain Scale Score	At rest: _5/10__ 	With Activity: _8/10__ 	[X ] Refer to charted pain scores    THERAPY:    [ ] IV PCA Morphine		[ ] 5 mg/mL	[ ] 1 mg/mL  [X ] IV PCA Hydromorphone	[ ] 5 mg/mL	[X ] 1 mg/mL  [ ] IV PCA Fentanyl		[ ] 50 micrograms/mL    Demand dose __0.2_ lockout __6_ (minutes) Continuous Rate _0__ Total: _6__  mg used (in past 24 hours)      MEDICATIONS  (STANDING):  acetaminophen  IVPB .. 1000 milliGRAM(s) IV Intermittent once  acetaminophen  IVPB .. 1000 milliGRAM(s) IV Intermittent once  dextrose 50% Injectable 25 Gram(s) IV Push once  heparin   Injectable 5000 Unit(s) SubCutaneous every 8 hours  HYDROmorphone PCA (1 mG/mL) 30 milliLiter(s) PCA Continuous PCA Continuous  insulin lispro (ADMELOG) corrective regimen sliding scale   SubCutaneous three times a day before meals  lactated ringers. 1000 milliLiter(s) (30 mL/Hr) IV Continuous <Continuous>  lidocaine   5% Patch 1 Patch Transdermal every 24 hours  losartan 25 milliGRAM(s) Oral daily  multivitamin 1 Tablet(s) Oral daily  polyethylene glycol 3350 17 Gram(s) Oral daily  senna 2 Tablet(s) Oral at bedtime    MEDICATIONS  (PRN):  diphenhydrAMINE   Injectable 25 milliGRAM(s) IV Push every 6 hours PRN Pruritus  ketorolac   Injectable 15 milliGRAM(s) IV Push every 8 hours PRN Severe Pain (7 - 10)  metoclopramide Injectable 10 milliGRAM(s) IV Push every 8 hours PRN Nausea / Vomiting  naloxone Injectable 0.1 milliGRAM(s) IV Push every 3 minutes PRN For ANY of the following changes in patient status:  A. RR LESS THAN 10 breaths per minute, B. Oxygen saturation LESS THAN 90%, C. Sedation score of 6  ondansetron Injectable 4 milliGRAM(s) IV Push every 6 hours PRN Nausea  simethicone 80 milliGRAM(s) Chew every 8 hours PRN Gas      OBJECTIVE:  Patient is sitting up in chair after ambulating around  unit.     Sedation Score:	[ X] Alert	 [ ] Drowsy 	[ ] Arousable	[ ] Asleep	[ ] Unresponsive    Side Effects:	[ ] None	[ ] Nausea	[ ] Vomiting	[x ] Pruritus  		[x ] Other: dizziness    Vital Signs Last 24 Hrs  T(C): 36.9 (22 Aug 2021 08:00), Max: 37.1 (21 Aug 2021 16:00)  T(F): 98.5 (22 Aug 2021 08:00), Max: 98.8 (21 Aug 2021 16:00)  HR: 96 (22 Aug 2021 10:00) (80 - 101)  BP: 131/78 (22 Aug 2021 10:00) (119/76 - 163/89)  BP(mean): 95 (22 Aug 2021 10:00) (80 - 114)  RR: 15 (22 Aug 2021 10:00) (10 - 27)  SpO2: 97% (22 Aug 2021 10:00) (91% - 100%)    ASSESSMENT/ PLAN    Therapy to  be:	[ X] Continue   [ ] Discontinued   [ ] Change to prn Analgesics    Documentation and Verification of current medications:   [X] Done	[ ] Not done, not elligible    Comments: Will continue with IV Dilaudid PCA. Demand dose lowered to 0.15mg, however if side effects of IV PCA do not improve in 2-3 hours, please call pain service so that IV PCA may be discontinued and change patient to oral pain medications.  Recommend non-opioid adjuvant analgesics to be used when possible and when allowed by primary surgical team.

## 2021-08-23 LAB
ANION GAP SERPL CALC-SCNC: 13 MMOL/L — SIGNIFICANT CHANGE UP (ref 7–14)
BUN SERPL-MCNC: 10 MG/DL — SIGNIFICANT CHANGE UP (ref 7–23)
CALCIUM SERPL-MCNC: 9.2 MG/DL — SIGNIFICANT CHANGE UP (ref 8.4–10.5)
CHLORIDE SERPL-SCNC: 99 MMOL/L — SIGNIFICANT CHANGE UP (ref 98–107)
CO2 SERPL-SCNC: 22 MMOL/L — SIGNIFICANT CHANGE UP (ref 22–31)
CREAT SERPL-MCNC: 0.6 MG/DL — SIGNIFICANT CHANGE UP (ref 0.5–1.3)
GLUCOSE BLDC GLUCOMTR-MCNC: 117 MG/DL — HIGH (ref 70–99)
GLUCOSE BLDC GLUCOMTR-MCNC: 176 MG/DL — HIGH (ref 70–99)
GLUCOSE BLDC GLUCOMTR-MCNC: 207 MG/DL — HIGH (ref 70–99)
GLUCOSE BLDC GLUCOMTR-MCNC: 211 MG/DL — HIGH (ref 70–99)
GLUCOSE SERPL-MCNC: 124 MG/DL — HIGH (ref 70–99)
HCT VFR BLD CALC: 31.6 % — LOW (ref 34.5–45)
HGB BLD-MCNC: 10.3 G/DL — LOW (ref 11.5–15.5)
MAGNESIUM SERPL-MCNC: 1.8 MG/DL — SIGNIFICANT CHANGE UP (ref 1.6–2.6)
MCHC RBC-ENTMCNC: 27.2 PG — SIGNIFICANT CHANGE UP (ref 27–34)
MCHC RBC-ENTMCNC: 32.6 GM/DL — SIGNIFICANT CHANGE UP (ref 32–36)
MCV RBC AUTO: 83.6 FL — SIGNIFICANT CHANGE UP (ref 80–100)
NRBC # BLD: 0 /100 WBCS — SIGNIFICANT CHANGE UP
NRBC # FLD: 0 K/UL — SIGNIFICANT CHANGE UP
PHOSPHATE SERPL-MCNC: 3.1 MG/DL — SIGNIFICANT CHANGE UP (ref 2.5–4.5)
PLATELET # BLD AUTO: 210 K/UL — SIGNIFICANT CHANGE UP (ref 150–400)
POTASSIUM SERPL-MCNC: 3.8 MMOL/L — SIGNIFICANT CHANGE UP (ref 3.5–5.3)
POTASSIUM SERPL-SCNC: 3.8 MMOL/L — SIGNIFICANT CHANGE UP (ref 3.5–5.3)
RBC # BLD: 3.78 M/UL — LOW (ref 3.8–5.2)
RBC # FLD: 13.8 % — SIGNIFICANT CHANGE UP (ref 10.3–14.5)
SODIUM SERPL-SCNC: 134 MMOL/L — LOW (ref 135–145)
WBC # BLD: 6.89 K/UL — SIGNIFICANT CHANGE UP (ref 3.8–10.5)
WBC # FLD AUTO: 6.89 K/UL — SIGNIFICANT CHANGE UP (ref 3.8–10.5)

## 2021-08-23 PROCEDURE — 99233 SBSQ HOSP IP/OBS HIGH 50: CPT

## 2021-08-23 PROCEDURE — 71045 X-RAY EXAM CHEST 1 VIEW: CPT | Mod: 26,77

## 2021-08-23 PROCEDURE — 71045 X-RAY EXAM CHEST 1 VIEW: CPT | Mod: 26

## 2021-08-23 RX ORDER — POTASSIUM CHLORIDE 20 MEQ
20 PACKET (EA) ORAL ONCE
Refills: 0 | Status: COMPLETED | OUTPATIENT
Start: 2021-08-23 | End: 2021-08-23

## 2021-08-23 RX ORDER — OXYCODONE HYDROCHLORIDE 5 MG/1
10 TABLET ORAL EVERY 6 HOURS
Refills: 0 | Status: DISCONTINUED | OUTPATIENT
Start: 2021-08-23 | End: 2021-08-23

## 2021-08-23 RX ORDER — ACETAMINOPHEN 500 MG
1000 TABLET ORAL ONCE
Refills: 0 | Status: COMPLETED | OUTPATIENT
Start: 2021-08-23 | End: 2021-08-23

## 2021-08-23 RX ORDER — OXYCODONE HYDROCHLORIDE 5 MG/1
5 TABLET ORAL EVERY 4 HOURS
Refills: 0 | Status: DISCONTINUED | OUTPATIENT
Start: 2021-08-23 | End: 2021-08-23

## 2021-08-23 RX ORDER — ACETAMINOPHEN 500 MG
1000 TABLET ORAL ONCE
Refills: 0 | Status: COMPLETED | OUTPATIENT
Start: 2021-08-23 | End: 2021-08-24

## 2021-08-23 RX ORDER — TRAMADOL HYDROCHLORIDE 50 MG/1
25 TABLET ORAL EVERY 4 HOURS
Refills: 0 | Status: DISCONTINUED | OUTPATIENT
Start: 2021-08-23 | End: 2021-08-24

## 2021-08-23 RX ORDER — MAGNESIUM SULFATE 500 MG/ML
2 VIAL (ML) INJECTION ONCE
Refills: 0 | Status: COMPLETED | OUTPATIENT
Start: 2021-08-23 | End: 2021-08-23

## 2021-08-23 RX ORDER — OXYCODONE HYDROCHLORIDE 5 MG/1
5 TABLET ORAL EVERY 6 HOURS
Refills: 0 | Status: DISCONTINUED | OUTPATIENT
Start: 2021-08-23 | End: 2021-08-23

## 2021-08-23 RX ORDER — OXYCODONE HYDROCHLORIDE 5 MG/1
2.5 TABLET ORAL EVERY 4 HOURS
Refills: 0 | Status: DISCONTINUED | OUTPATIENT
Start: 2021-08-23 | End: 2021-08-23

## 2021-08-23 RX ADMIN — LOSARTAN POTASSIUM 25 MILLIGRAM(S): 100 TABLET, FILM COATED ORAL at 06:05

## 2021-08-23 RX ADMIN — HEPARIN SODIUM 5000 UNIT(S): 5000 INJECTION INTRAVENOUS; SUBCUTANEOUS at 13:20

## 2021-08-23 RX ADMIN — LIDOCAINE 1 PATCH: 4 CREAM TOPICAL at 00:00

## 2021-08-23 RX ADMIN — Medication 15 MILLIGRAM(S): at 00:02

## 2021-08-23 RX ADMIN — TRAMADOL HYDROCHLORIDE 25 MILLIGRAM(S): 50 TABLET ORAL at 20:54

## 2021-08-23 RX ADMIN — Medication 20 MILLIEQUIVALENT(S): at 08:39

## 2021-08-23 RX ADMIN — Medication 1000 MILLIGRAM(S): at 02:57

## 2021-08-23 RX ADMIN — HEPARIN SODIUM 5000 UNIT(S): 5000 INJECTION INTRAVENOUS; SUBCUTANEOUS at 22:44

## 2021-08-23 RX ADMIN — Medication 4: at 12:01

## 2021-08-23 RX ADMIN — SENNA PLUS 2 TABLET(S): 8.6 TABLET ORAL at 22:44

## 2021-08-23 RX ADMIN — Medication 400 MILLIGRAM(S): at 02:10

## 2021-08-23 RX ADMIN — OXYCODONE HYDROCHLORIDE 5 MILLIGRAM(S): 5 TABLET ORAL at 13:20

## 2021-08-23 RX ADMIN — OXYCODONE HYDROCHLORIDE 5 MILLIGRAM(S): 5 TABLET ORAL at 12:00

## 2021-08-23 RX ADMIN — Medication 15 MILLIGRAM(S): at 01:00

## 2021-08-23 RX ADMIN — Medication 50 GRAM(S): at 06:43

## 2021-08-23 RX ADMIN — Medication 1 TABLET(S): at 12:01

## 2021-08-23 RX ADMIN — Medication 5 MILLIGRAM(S): at 12:00

## 2021-08-23 RX ADMIN — HEPARIN SODIUM 5000 UNIT(S): 5000 INJECTION INTRAVENOUS; SUBCUTANEOUS at 06:05

## 2021-08-23 NOTE — PROGRESS NOTE ADULT - PROVIDER SPECIALTY LIST ADULT
Critical Care
Critical Care
Pain Medicine
Critical Care
Critical Care
Pain Medicine

## 2021-08-23 NOTE — PROGRESS NOTE ADULT - SUBJECTIVE AND OBJECTIVE BOX
CHIEF COMPLAINT: FOLLOW UP IN ICU FOR POSTOPERATIVE CARE OF PATIENT WHO IS S/P THORACOTOMY RUL LOBECTOMY      PROCEDURES: R robotic-assisted VATS converted to open thoracotomy [injury to posterior segmental artery] RUL lobectomy w/MLND 20-Aug-2021       ISSUES:   Lung nodule  Pneumothorax  Post op pain  Chest tube in place  HTN  DM2      INTERVAL EVENTS:   Chest tube with no airleak      HISTORY:   Patient reports moderate pain at chest wall incision sites which is worse with coughing and deep breathing without associated fever or dyspnea. Pain is improved with use of pain meds.     PHYSICAL EXAM:   Gen: Comfortable, No acute distress  Eyes: Sclera white, Conjunctiva normal, Eyelids normal, Pupils symmetrical   ENT: Mucous membranes moist,  ,  ,    Neck: Trachea midline,  ,  ,  ,  ,    CV: Rate regular, Rhythm regular,  ,  ,    Resp: Breath sounds clear, No accessory muscles use, chest tube  Abd: Soft, Non-distended, Non-tender, Bowel sounds normal,  ,  ,    Skin: Warm, No peripheral edema of lower extremities,  ,    : No beckman  Neuro: Moving all 4 extremities,    Psych: A&Ox3      ASSESSMENT AND PLAN:     NEURO:  Post-operative Pain - Stable. Pain control with oxycodone and Tylenol IV PRN.      RESPIRATORY:  Hypoxia - Wean nasal cannula for goal O2sat above 92. Obtain CXR. Incentive spirometry. Chest PT and frequent suctioning. Continue bronchodilators. OOB to chair & ambulate w/ assistance. Continuous pulse oximetry for support & to prevent decompensation.       Chest tube – Pleurevac regulated waterseal. Monitor chest tube output. Waterseal trial with repeat CXR. Discontinue chest tube    Pneumothorax - stable. Repeat CXR.    CARDIOVASCULAR:  Hemodynamically stable - Not on pressors. Continue hemodynamic monitoring.  Telemetry (medical test) - Reviewed by me today independently. Normal sinus rhythm.  HTN - stable. Continue home antihypertensives medications.    RENAL:  Stable - Monitor IOs and electrolytes. Keep K above 4.0 and Mg above 2.0.         GASTROINTESTINAL:  GI prophylaxis not indicated  Zofran and Reglan IV PRN for nausea  Regular consistency diet             HEMATOLOGIC:  No signs of active bleeding. Monitor Hgb in CBC in AM  DVT prophylaxis with heparin subQ and SCDs.           INFECTIOUS DISEASE:  All surgical sites appear clean. Will monitor for fever and leukocytosis.       ENDOCRINE:  DM2 – Stable. Monitor glucose fingersticks for goal 120-180. Insulin sliding scale. Carb control diet.            ONCOLOGY:  Lung nodule - Improved. S/P resection. Follow up final pathology.      Pertinent clinical, laboratory, radiographic, hemodynamic, echocardiographic, respiratory data, microbiologic data and chart were reviewed by myself and analyzed frequently throughout the course of the day and night by myself.    Plan discussed at length with the CTICU staff and Attending CT Surgeon -   Dr Gato Barrios.       Patient's status was discussed with patient at bedside.           ________________________________________________    _________________________  VITAL SIGNS:  Vital Signs Last 24 Hrs  T(C): 36.6 (23 Aug 2021 08:00), Max: 36.8 (22 Aug 2021 20:00)  T(F): 97.8 (23 Aug 2021 08:00), Max: 98.2 (22 Aug 2021 20:00)  HR: 89 (23 Aug 2021 09:00) (80 - 106)  BP: 154/100 (23 Aug 2021 09:00) (122/80 - 166/90)  BP(mean): 113 (23 Aug 2021 09:00) (91 - 124)  RR: 18 (23 Aug 2021 09:00) (11 - 23)  SpO2: 98% (23 Aug 2021 09:00) (93% - 99%)  I/Os:   I&O's Detail    22 Aug 2021 07:01  -  23 Aug 2021 07:00  --------------------------------------------------------  IN:    IV PiggyBack: 200 mL    Lactated Ringers: 660 mL    Oral Fluid: 480 mL  Total IN: 1340 mL    OUT:    Chest Tube (mL): 230 mL    Voided (mL): 650 mL  Total OUT: 880 mL    Total NET: 460 mL              MEDICATIONS:  MEDICATIONS  (STANDING):  acetaminophen  IVPB .. 1000 milliGRAM(s) IV Intermittent once  bisacodyl 5 milliGRAM(s) Oral once  dextrose 50% Injectable 25 Gram(s) IV Push once  heparin   Injectable 5000 Unit(s) SubCutaneous every 8 hours  insulin lispro (ADMELOG) corrective regimen sliding scale   SubCutaneous three times a day before meals  lidocaine   5% Patch 1 Patch Transdermal every 24 hours  losartan 25 milliGRAM(s) Oral daily  multivitamin 1 Tablet(s) Oral daily  polyethylene glycol 3350 17 Gram(s) Oral daily  senna 2 Tablet(s) Oral at bedtime    MEDICATIONS  (PRN):  diphenhydrAMINE   Injectable 25 milliGRAM(s) IV Push every 6 hours PRN Pruritus  metoclopramide Injectable 10 milliGRAM(s) IV Push every 8 hours PRN Nausea / Vomiting  ondansetron Injectable 4 milliGRAM(s) IV Push every 6 hours PRN Nausea  oxyCODONE    IR 5 milliGRAM(s) Oral every 4 hours PRN Moderate to Severe Pain (4 - 10)  simethicone 80 milliGRAM(s) Chew every 8 hours PRN Gas  sodium chloride 0.65% Nasal 1 Spray(s) Both Nostrils every 6 hours PRN Nasal Congestion      LABS:  Laboratory data was independently reviewed by me today.                           10.3   6.89  )-----------( 210      ( 23 Aug 2021 03:06 )             31.6     08-23    134<L>  |  99  |  10  ----------------------------<  124<H>  3.8   |  22  |  0.60    Ca    9.2      23 Aug 2021 03:06  Phos  3.1     08-23  Mg     1.80     08-23                RADIOLOGY:   Radiology images were independently reviewed by me today. Reports were reviewed by me today.    Xray Chest 1 View- PORTABLE-Routine:   EXAM:  XR CHEST PORTABLE ROUTINE 1V        PROCEDURE DATE:  Aug 23 2021         INTERPRETATION:  TIME OF EXAM: August 23, 2021 at 5:11 AM    CLINICAL INFORMATION: Follow-up pneumothorax.    TECHNIQUE:   Portable chest    INTERPRETATION:    Right sided chest tube and tiny apical pneumothorax on this side again identified.  Lungs are clear. Loss of volume in the right lung. The left lung is clear. The heart size is stable.      COMPARISON:  August 22      IMPRESSION:  Follow-up post right thoracotomy with chest tube and residual apical pneumothorax.    --- End of Report ---              MARGI ALMARAZ MD; Attending Radiologist  This document has been electronically signed. Aug 23 2021  7:02AM (08-23-21 @ 06:18)  Xray Chest 1 View- PORTABLE-Routine:   EXAM:  XR CHEST PORTABLE ROUTINE 1V        PROCEDURE DATE:  Aug 22 2021         INTERPRETATION:  Chest one view    HISTORY: Chest tube follow-up    COMPARISON STUDY: 8/21/2021    Frontal expiratory view of the chest shows the heart to be normal in size. Right chest tube and right apical chain sutures are present.    The lungs show decreased central congestion with less left atelectasis with small right apical pneumothorax and there is no evidence of definite pleural effusion.    IMPRESSION:  Small right apical pneumothorax.      Thank you for the courtesy of this referral.    --- End of Report ---              KEL DILLON MD; Attending Interventional Radiologist  This document has been electronically signed. Aug 22 2021  3:24PM (08-22-21 @ 05:38)  Xray Chest 1 View- PORTABLE-Urgent:   EXAM:  XR CHEST PORTABLE URGENT 1V        PROCEDURE DATE:  Aug 21 2021         INTERPRETATION:  Chest one view    HISTORY: Chest tube removal    COMPARISON STUDY: Earlier the same day    Frontal expiratory view of the chest shows the heart to be similar in size. One right chest tube remains.    The lungs show similar left base atelectasis and there is no evidence of pneumothorax nor pleural effusion.    IMPRESSION:  No pneumothorax.      Thank you for the courtesy of this referral.    --- End of Report ---              KEL DILLON MD; Attending Interventional Radiologist  This document has been electronically signed. Aug 22 2021 12:22PM (08-21-21 @ 17:40)

## 2021-08-23 NOTE — PROGRESS NOTE ADULT - SUBJECTIVE AND OBJECTIVE BOX
Anesthesia Pain Management Service    SUBJECTIVE: Patient is doing well with IV PCA and no significant problems reported.  Nausea and dizziness has resolved, but patient is not eating a lot.  Patient does not like the food here except for the oatmeal.    Pain Scale Score	At rest: _5/10 	With Activity: ___ 	[X ] Refer to charted pain scores    THERAPY:    [ ] IV PCA Morphine		[ ] 5 mg/mL	[ ] 1 mg/mL  [X ] IV PCA Hydromorphone	[ ] 5 mg/mL	[X ] 1 mg/mL  [ ] IV PCA Fentanyl		[ ] 50 micrograms/mL    Demand dose __0.2_ lockout __6_ (minutes) Continuous Rate _0__ Total: _0.6__   mg used (in past 24 hrs)      MEDICATIONS  (STANDING):  acetaminophen  IVPB .. 1000 milliGRAM(s) IV Intermittent once  bisacodyl 5 milliGRAM(s) Oral once  dextrose 50% Injectable 25 Gram(s) IV Push once  heparin   Injectable 5000 Unit(s) SubCutaneous every 8 hours  insulin lispro (ADMELOG) corrective regimen sliding scale   SubCutaneous three times a day before meals  lidocaine   5% Patch 1 Patch Transdermal every 24 hours  losartan 25 milliGRAM(s) Oral daily  multivitamin 1 Tablet(s) Oral daily  polyethylene glycol 3350 17 Gram(s) Oral daily  senna 2 Tablet(s) Oral at bedtime    MEDICATIONS  (PRN):  diphenhydrAMINE   Injectable 25 milliGRAM(s) IV Push every 6 hours PRN Pruritus  metoclopramide Injectable 10 milliGRAM(s) IV Push every 8 hours PRN Nausea / Vomiting  ondansetron Injectable 4 milliGRAM(s) IV Push every 6 hours PRN Nausea  oxyCODONE    IR 5 milliGRAM(s) Oral every 4 hours PRN Moderate to Severe Pain (4 - 10)  simethicone 80 milliGRAM(s) Chew every 8 hours PRN Gas  sodium chloride 0.65% Nasal 1 Spray(s) Both Nostrils every 6 hours PRN Nasal Congestion      OBJECTIVE:  Patient is sitting up in chair.     Sedation Score:	[ X] Alert	[ ] Drowsy 	[ ] Arousable	[ ] Asleep	[ ] Unresponsive    Side Effects:	[X ] None	[ ] Nausea	[ ] Vomiting	[ ] Pruritus  		[ ] Other:    Vital Signs Last 24 Hrs  T(C): 36.6 (23 Aug 2021 08:00), Max: 36.8 (22 Aug 2021 20:00)  T(F): 97.8 (23 Aug 2021 08:00), Max: 98.2 (22 Aug 2021 20:00)  HR: 89 (23 Aug 2021 09:00) (80 - 106)  BP: 154/100 (23 Aug 2021 09:00) (122/80 - 166/90)  BP(mean): 113 (23 Aug 2021 09:00) (91 - 124)  RR: 18 (23 Aug 2021 09:00) (11 - 23)  SpO2: 98% (23 Aug 2021 09:00) (93% - 99%)    ASSESSMENT/ PLAN    Therapy to  be:	[ ] Continue   [ X] Discontinued   [X ] Change to prn Analgesics    Documentation and Verification of current medications:   [X] Done	[ ] Not done, not elligible    Comments: IV Dilaudid PCA discontinued by team.  PRN Oral/IV opioids and/or Adjuvant non-opioid medication to be ordered at this point.     Anesthesia Pain Management Service    SUBJECTIVE: Patient is doing well with IV PCA and no significant problems reported.  Nausea and dizziness has resolved, but patient is not eating a lot.  Patient does not like the food here except for the oatmeal.    Pain Scale Score	At rest: _5/10 	With Activity: ___ 	[X ] Refer to charted pain scores    THERAPY:    [ ] IV PCA Morphine		[ ] 5 mg/mL	[ ] 1 mg/mL  [X ] IV PCA Hydromorphone	[ ] 5 mg/mL	[X ] 1 mg/mL  [ ] IV PCA Fentanyl		[ ] 50 micrograms/mL    Demand dose __0.2_ lockout __6_ (minutes) Continuous Rate _0__ Total: _3.1__   mg used (in past 24 hrs)      MEDICATIONS  (STANDING):  acetaminophen  IVPB .. 1000 milliGRAM(s) IV Intermittent once  bisacodyl 5 milliGRAM(s) Oral once  dextrose 50% Injectable 25 Gram(s) IV Push once  heparin   Injectable 5000 Unit(s) SubCutaneous every 8 hours  insulin lispro (ADMELOG) corrective regimen sliding scale   SubCutaneous three times a day before meals  lidocaine   5% Patch 1 Patch Transdermal every 24 hours  losartan 25 milliGRAM(s) Oral daily  multivitamin 1 Tablet(s) Oral daily  polyethylene glycol 3350 17 Gram(s) Oral daily  senna 2 Tablet(s) Oral at bedtime    MEDICATIONS  (PRN):  diphenhydrAMINE   Injectable 25 milliGRAM(s) IV Push every 6 hours PRN Pruritus  metoclopramide Injectable 10 milliGRAM(s) IV Push every 8 hours PRN Nausea / Vomiting  ondansetron Injectable 4 milliGRAM(s) IV Push every 6 hours PRN Nausea  oxyCODONE    IR 5 milliGRAM(s) Oral every 4 hours PRN Moderate to Severe Pain (4 - 10)  simethicone 80 milliGRAM(s) Chew every 8 hours PRN Gas  sodium chloride 0.65% Nasal 1 Spray(s) Both Nostrils every 6 hours PRN Nasal Congestion      OBJECTIVE:  Patient is sitting up in chair.     Sedation Score:	[ X] Alert	[ ] Drowsy 	[ ] Arousable	[ ] Asleep	[ ] Unresponsive    Side Effects:	[X ] None	[ ] Nausea	[ ] Vomiting	[ ] Pruritus  		[ ] Other:    Vital Signs Last 24 Hrs  T(C): 36.6 (23 Aug 2021 08:00), Max: 36.8 (22 Aug 2021 20:00)  T(F): 97.8 (23 Aug 2021 08:00), Max: 98.2 (22 Aug 2021 20:00)  HR: 89 (23 Aug 2021 09:00) (80 - 106)  BP: 154/100 (23 Aug 2021 09:00) (122/80 - 166/90)  BP(mean): 113 (23 Aug 2021 09:00) (91 - 124)  RR: 18 (23 Aug 2021 09:00) (11 - 23)  SpO2: 98% (23 Aug 2021 09:00) (93% - 99%)    ASSESSMENT/ PLAN    Therapy to  be:	[ ] Continue   [ X] Discontinued   [X ] Change to prn Analgesics    Documentation and Verification of current medications:   [X] Done	[ ] Not done, not elligible    Comments: IV Dilaudid PCA discontinued by team.  PRN Oral/IV opioids and/or Adjuvant non-opioid medication to be ordered at this point.

## 2021-08-23 NOTE — PROGRESS NOTE ADULT - SUBJECTIVE AND OBJECTIVE BOX
Anesthesia Pain Management Service- Attending Addendum    SUBJECTIVE: Patient's pain control adequate    Therapy:	  [ X] IV PCA	   [ ] Epidural           [ ] s/p Spinal Opoid              [ ] Postpartum infusion	  [ ] Patient controlled regional anesthesia (PCRA)    [ ] prn Analgesics    Allergies    IV Contrast (Pruritus)    Intolerances      MEDICATIONS  (STANDING):  acetaminophen  IVPB .. 1000 milliGRAM(s) IV Intermittent once  dextrose 50% Injectable 25 Gram(s) IV Push once  heparin   Injectable 5000 Unit(s) SubCutaneous every 8 hours  insulin lispro (ADMELOG) corrective regimen sliding scale   SubCutaneous three times a day before meals  lidocaine   5% Patch 1 Patch Transdermal every 24 hours  losartan 25 milliGRAM(s) Oral daily  multivitamin 1 Tablet(s) Oral daily  polyethylene glycol 3350 17 Gram(s) Oral daily  senna 2 Tablet(s) Oral at bedtime    MEDICATIONS  (PRN):  diphenhydrAMINE   Injectable 25 milliGRAM(s) IV Push every 6 hours PRN Pruritus  metoclopramide Injectable 10 milliGRAM(s) IV Push every 8 hours PRN Nausea / Vomiting  ondansetron Injectable 4 milliGRAM(s) IV Push every 6 hours PRN Nausea  oxyCODONE    IR 5 milliGRAM(s) Oral every 4 hours PRN Moderate to Severe Pain (4 - 10)  oxyCODONE    IR 2.5 milliGRAM(s) Oral every 4 hours PRN Mild Pain (1 - 3)  simethicone 80 milliGRAM(s) Chew every 8 hours PRN Gas  sodium chloride 0.65% Nasal 1 Spray(s) Both Nostrils every 6 hours PRN Nasal Congestion      OBJECTIVE:   [X] No new signs     [ ] Other:    Side Effects:  [X ] None			[ ] Other:      ASSESSMENT/PLAN  -Discontinue current therapy    [ ] Therapy changed to:    [ ] IV PCA       [ ] Epidural     [ X] prn Analgesics     Comments: Pain management per primary team, APS to sign off

## 2021-08-24 ENCOUNTER — TRANSCRIPTION ENCOUNTER (OUTPATIENT)
Age: 56
End: 2021-08-24

## 2021-08-24 VITALS
RESPIRATION RATE: 16 BRPM | TEMPERATURE: 98 F | SYSTOLIC BLOOD PRESSURE: 149 MMHG | DIASTOLIC BLOOD PRESSURE: 76 MMHG | OXYGEN SATURATION: 100 % | HEART RATE: 95 BPM

## 2021-08-24 LAB
ANION GAP SERPL CALC-SCNC: 13 MMOL/L — SIGNIFICANT CHANGE UP (ref 7–14)
BLD GP AB SCN SERPL QL: NEGATIVE — SIGNIFICANT CHANGE UP
BUN SERPL-MCNC: 9 MG/DL — SIGNIFICANT CHANGE UP (ref 7–23)
CALCIUM SERPL-MCNC: 9.1 MG/DL — SIGNIFICANT CHANGE UP (ref 8.4–10.5)
CHLORIDE SERPL-SCNC: 103 MMOL/L — SIGNIFICANT CHANGE UP (ref 98–107)
CO2 SERPL-SCNC: 24 MMOL/L — SIGNIFICANT CHANGE UP (ref 22–31)
CREAT SERPL-MCNC: 0.58 MG/DL — SIGNIFICANT CHANGE UP (ref 0.5–1.3)
GLUCOSE BLDC GLUCOMTR-MCNC: 126 MG/DL — HIGH (ref 70–99)
GLUCOSE SERPL-MCNC: 127 MG/DL — HIGH (ref 70–99)
HCT VFR BLD CALC: 31.9 % — LOW (ref 34.5–45)
HGB BLD-MCNC: 10.5 G/DL — LOW (ref 11.5–15.5)
MAGNESIUM SERPL-MCNC: 1.7 MG/DL — SIGNIFICANT CHANGE UP (ref 1.6–2.6)
MCHC RBC-ENTMCNC: 27.3 PG — SIGNIFICANT CHANGE UP (ref 27–34)
MCHC RBC-ENTMCNC: 32.9 GM/DL — SIGNIFICANT CHANGE UP (ref 32–36)
MCV RBC AUTO: 83.1 FL — SIGNIFICANT CHANGE UP (ref 80–100)
NRBC # BLD: 0 /100 WBCS — SIGNIFICANT CHANGE UP
NRBC # FLD: 0 K/UL — SIGNIFICANT CHANGE UP
PLATELET # BLD AUTO: 243 K/UL — SIGNIFICANT CHANGE UP (ref 150–400)
POTASSIUM SERPL-MCNC: 3.9 MMOL/L — SIGNIFICANT CHANGE UP (ref 3.5–5.3)
POTASSIUM SERPL-SCNC: 3.9 MMOL/L — SIGNIFICANT CHANGE UP (ref 3.5–5.3)
RBC # BLD: 3.84 M/UL — SIGNIFICANT CHANGE UP (ref 3.8–5.2)
RBC # FLD: 13.8 % — SIGNIFICANT CHANGE UP (ref 10.3–14.5)
RH IG SCN BLD-IMP: NEGATIVE — SIGNIFICANT CHANGE UP
SODIUM SERPL-SCNC: 140 MMOL/L — SIGNIFICANT CHANGE UP (ref 135–145)
SURGICAL PATHOLOGY STUDY: SIGNIFICANT CHANGE UP
WBC # BLD: 6.06 K/UL — SIGNIFICANT CHANGE UP (ref 3.8–10.5)
WBC # FLD AUTO: 6.06 K/UL — SIGNIFICANT CHANGE UP (ref 3.8–10.5)

## 2021-08-24 PROCEDURE — 71045 X-RAY EXAM CHEST 1 VIEW: CPT | Mod: 26

## 2021-08-24 RX ORDER — LIDOCAINE 4 G/100G
1 CREAM TOPICAL
Qty: 14 | Refills: 0
Start: 2021-08-24 | End: 2021-09-06

## 2021-08-24 RX ORDER — ACETAMINOPHEN 500 MG
2 TABLET ORAL
Qty: 0 | Refills: 0 | DISCHARGE

## 2021-08-24 RX ORDER — TRAMADOL HYDROCHLORIDE 50 MG/1
0.5 TABLET ORAL
Qty: 30 | Refills: 0
Start: 2021-08-24 | End: 2021-09-02

## 2021-08-24 RX ORDER — POLYETHYLENE GLYCOL 3350 17 G/17G
17 POWDER, FOR SOLUTION ORAL
Qty: 0 | Refills: 0 | DISCHARGE
Start: 2021-08-24

## 2021-08-24 RX ORDER — SENNA PLUS 8.6 MG/1
2 TABLET ORAL
Qty: 0 | Refills: 0 | DISCHARGE
Start: 2021-08-24

## 2021-08-24 RX ADMIN — TRAMADOL HYDROCHLORIDE 25 MILLIGRAM(S): 50 TABLET ORAL at 11:37

## 2021-08-24 RX ADMIN — HEPARIN SODIUM 5000 UNIT(S): 5000 INJECTION INTRAVENOUS; SUBCUTANEOUS at 05:42

## 2021-08-24 RX ADMIN — TRAMADOL HYDROCHLORIDE 25 MILLIGRAM(S): 50 TABLET ORAL at 07:24

## 2021-08-24 RX ADMIN — TRAMADOL HYDROCHLORIDE 25 MILLIGRAM(S): 50 TABLET ORAL at 03:09

## 2021-08-24 RX ADMIN — TRAMADOL HYDROCHLORIDE 25 MILLIGRAM(S): 50 TABLET ORAL at 02:14

## 2021-08-24 RX ADMIN — Medication 400 MILLIGRAM(S): at 08:59

## 2021-08-24 RX ADMIN — LOSARTAN POTASSIUM 25 MILLIGRAM(S): 100 TABLET, FILM COATED ORAL at 05:41

## 2021-08-24 RX ADMIN — LIDOCAINE 1 PATCH: 4 CREAM TOPICAL at 11:38

## 2021-08-24 RX ADMIN — TRAMADOL HYDROCHLORIDE 25 MILLIGRAM(S): 50 TABLET ORAL at 06:16

## 2021-08-24 NOTE — DISCHARGE NOTE PROVIDER - CARE PROVIDER_API CALL
Gato Barrios)  Thoracic Surgery  [phone] [place] [address]  Phone: [phone]  Fax: [phone]  Established Patient  Follow Up Time:

## 2021-08-24 NOTE — DISCHARGE NOTE PROVIDER - HOSPITAL COURSE
This 56 year old female had a recent CT chest that showed a right lung nodule.  This was followed with a PET scan in 6/2021 and then a bronchoscopy 7/02/2021.  Bx showed NSCLC,  On 8/20/21 she underwent a FB, Robotic , Robo assisted R VATS to thoracotomy, Right Upper Lobectomy.  The chest tube was removed on 8/23 and she was for discharge home the next day.

## 2021-08-24 NOTE — DISCHARGE NOTE NURSING/CASE MANAGEMENT/SOCIAL WORK - NSDCPEFALRISK_GEN_ALL_CORE
For information on Fall & injury Prevention, visit [email or web address]

## 2021-08-24 NOTE — DISCHARGE NOTE NURSING/CASE MANAGEMENT/SOCIAL WORK - PATIENT PORTAL LINK FT
You can access the FollowMyHealth Patient Portal offered by [facility] by registering at the following website: [email or web address]. By joining [email or web address]’s FollowMyHealth portal, you will also be able to view your health information using other applications (apps) compatible with our system.

## 2021-08-24 NOTE — DISCHARGE NOTE PROVIDER - NSDCMRMEDTOKEN_GEN_ALL_CORE_FT
ferrous sulfate 325 mg (65 mg elemental iron) oral tablet: orally once a day, AM  losartan 25 mg oral tablet: 4 tab(s) orally once a day, AM  metFORMIN 500 mg oral tablet: 1 tab(s) orally 2 times a day  multivitamin:   once daily, LD 8/13/21  Vitamin B-12 1000 mcg oral tablet: 1 tab(s) orally once a day, AM   ferrous sulfate 325 mg (65 mg elemental iron) oral tablet: orally once a day, AM  lidocaine 5% topical film: Apply topically to affected area once a day   losartan 25 mg oral tablet: 4 tab(s) orally once a day, AM  metFORMIN 500 mg oral tablet: 1 tab(s) orally 2 times a day  multivitamin:   once daily, LD 8/13/21  polyethylene glycol 3350 oral powder for reconstitution: 17 gram(s) orally once a day  senna oral tablet: 2 tab(s) orally once a day (at bedtime)  traMADol 50 mg oral tablet: 0.5 - 1 tabe every 4h as needed for severe pain MDD:6  Tylenol 325 mg oral tablet: 2 tab(s) orally every 4 hours as needed for pain control  Vitamin B-12 1000 mcg oral tablet: 1 tab(s) orally once a day, AM

## 2021-08-24 NOTE — DISCHARGE NOTE PROVIDER - NSDCFUADDINST_GEN_ALL_CORE_FT
Keep the dressing on the chest tube site for two days and then remove it to shower.  Let the wound air dry.  Watch for pus, increased redness, fevers, and if noted, call Dr Barrios.

## 2021-08-24 NOTE — DISCHARGE NOTE PROVIDER - NSDCCPCAREPLAN_GEN_ALL_CORE_FT
PRINCIPAL DISCHARGE DIAGNOSIS  Diagnosis: H/O solitary pulmonary nodule  Assessment and Plan of Treatment:

## 2021-09-08 ENCOUNTER — APPOINTMENT (OUTPATIENT)
Dept: THORACIC SURGERY | Facility: CLINIC | Age: 56
End: 2021-09-08
Payer: MEDICAID

## 2021-09-08 VITALS
HEIGHT: 61 IN | OXYGEN SATURATION: 98 % | HEART RATE: 99 BPM | DIASTOLIC BLOOD PRESSURE: 88 MMHG | TEMPERATURE: 98 F | RESPIRATION RATE: 18 BRPM | WEIGHT: 150 LBS | BODY MASS INDEX: 28.32 KG/M2 | SYSTOLIC BLOOD PRESSURE: 139 MMHG

## 2021-09-08 PROCEDURE — 99024 POSTOP FOLLOW-UP VISIT: CPT

## 2021-09-22 ENCOUNTER — NON-APPOINTMENT (OUTPATIENT)
Age: 56
End: 2021-09-22

## 2021-09-22 ENCOUNTER — RX RENEWAL (OUTPATIENT)
Age: 56
End: 2021-09-22

## 2021-09-22 RX ORDER — TRAMADOL HYDROCHLORIDE 50 MG/1
50 TABLET, COATED ORAL
Qty: 16 | Refills: 0 | Status: DISCONTINUED | COMMUNITY
Start: 2021-09-22 | End: 2021-09-22

## 2021-09-29 ENCOUNTER — APPOINTMENT (OUTPATIENT)
Dept: RADIOLOGY | Facility: HOSPITAL | Age: 56
End: 2021-09-29

## 2021-09-29 ENCOUNTER — NON-APPOINTMENT (OUTPATIENT)
Age: 56
End: 2021-09-29

## 2021-09-29 ENCOUNTER — OUTPATIENT (OUTPATIENT)
Dept: OUTPATIENT SERVICES | Facility: HOSPITAL | Age: 56
LOS: 1 days | End: 2021-09-29
Payer: MEDICAID

## 2021-09-29 ENCOUNTER — APPOINTMENT (OUTPATIENT)
Dept: THORACIC SURGERY | Facility: CLINIC | Age: 56
End: 2021-09-29
Payer: MEDICAID

## 2021-09-29 VITALS
BODY MASS INDEX: 28.32 KG/M2 | TEMPERATURE: 97.9 F | DIASTOLIC BLOOD PRESSURE: 88 MMHG | SYSTOLIC BLOOD PRESSURE: 133 MMHG | HEIGHT: 61 IN | RESPIRATION RATE: 18 BRPM | WEIGHT: 150 LBS | OXYGEN SATURATION: 98 % | HEART RATE: 94 BPM

## 2021-09-29 DIAGNOSIS — Z90.49 ACQUIRED ABSENCE OF OTHER SPECIFIED PARTS OF DIGESTIVE TRACT: Chronic | ICD-10-CM

## 2021-09-29 DIAGNOSIS — C80.1 MALIGNANT (PRIMARY) NEOPLASM, UNSPECIFIED: ICD-10-CM

## 2021-09-29 DIAGNOSIS — K59.00 CONSTIPATION, UNSPECIFIED: ICD-10-CM

## 2021-09-29 PROCEDURE — 71046 X-RAY EXAM CHEST 2 VIEWS: CPT | Mod: 26

## 2021-09-29 PROCEDURE — 99024 POSTOP FOLLOW-UP VISIT: CPT

## 2021-10-05 NOTE — ED PROVIDER NOTE - NS CPE EDP MUSC SPINE EXAM
Physician Discharge Summary     Patient ID:  Maria L Winston  [id number]  81 y.o.  8/30/1926    Admit date: 10/3/2021    Discharge date and time:  10/5/2021     Admission Diagnoses:   Chief Complaint   Patient presents with    Rib Pain (injury)     transfer from [place] for right sided rib fx       Closed fracture of multiple ribs of right side     Discharge Diagnoses:   Principal Problem:    Closed fracture of multiple ribs of right side  Active Problems:    HTN (hypertension), benign    Diabetes mellitus type 2, uncontrolled ([facility] [address]    Lower leg DVT (deep venous thromboembolism), acute, right (HCC)    Chronic heart failure with preserved ejection fraction ([facility] [address]    COVID-19    Pneumonia due to COVID-19 virus  Resolved Problems:    * No resolved hospital problems. *       Consults: none    Procedures: none    Hospital Course:   Patient fell and broke a couple of ribs. He was incidentally found to be Covid positive but he has no infiltrates on his chest CT. He has been vaccinated. He has no respiratory symptoms. He is at his baseline home O2 requirement. He does have mild pain but it is tolerable without narcotics. He is safe to go home.      Discharge Exam:  Vitals:    10/04/21 0400 10/04/21 0830 10/04/21 2030 10/05/21 0915   BP:  112/72 110/65 136/74   Pulse: 77 92 65 96   Resp:  18 18 18   Temp:  98.1 °F (36.7 °C) 97 °F (36.1 °C)    TempSrc:  Oral Oral Oral   SpO2: 97%  97% 97%        General appearance: NAD, conversant  HEENT: AT/NC, MMM  Neck: FROM, supple  Lungs: Clear to auscultation, WOB normal  CV: RRR, no MRGs  Chest wall: Slight R anterior chest wall tenderness  Abdomen: Soft, non-tender; no masses or HSM, +BS  Extremities: No peripheral edema or digital cyanosis  Skin: no rash, lesions or ulcers  Psych: Calm and cooperative  Neuro: Alert and interactive, nonfocal     Condition:  Stable    Disposition: home    Patient Instructions:   Current Discharge Medication List      START taking these medications Details   dexamethasone (DECADRON) 6 MG tablet Take 1 tablet by mouth daily for 3 days  Qty: 3 tablet, Refills: 0      lidocaine 4 % external patch Place 1 patch onto the skin daily  Qty: 10 patch, Refills: 0         CONTINUE these medications which have CHANGED    Details   insulin detemir (LEVEMIR FLEXTOUCH) 100 UNIT/ML injection pen Inject 20 Units into the skin every evening  Qty: 5 pen, Refills: 3         CONTINUE these medications which have NOT CHANGED    Details   !! apixaban (ELIQUIS) 2.5 MG TABS tablet Take by mouth 2 times daily      furosemide (LASIX) 20 MG tablet Take 1 tablet by mouth See Admin Instructions  Qty: 60 tablet, Refills: 3      dutasteride (AVODART) 0.5 MG capsule Take 0.5 mg by mouth daily      tamsulosin (FLOMAX) 0.4 MG capsule Take 0.4 mg by mouth daily      !! apixaban (ELIQUIS) 5 MG TABS tablet Take 1 tablet by mouth 2 times daily  Qty: 60 tablet, Refills: 0      escitalopram (LEXAPRO) 10 MG tablet Take 10 mg by mouth daily      Cholecalciferol (VITAMIN D3) 5000 units TABS Take 5,000 Units by mouth      oxybutynin (DITROPAN) 5 MG tablet Take 5 mg by mouth every morning       atorvastatin (LIPITOR) 10 MG tablet Take 10 mg by mouth nightly       ketoconazole (NIZORAL) 2 % cream Apply topically daily Apply topically daily. Multiple Vitamins-Minerals (MULTIVITAMIN ADULT PO) Take 1 tablet by mouth every other day       vitamin E 400 UNIT capsule Take 400 Units by mouth every morning       aspirin 81 MG tablet Take 81 mg by mouth every morning       linagliptin (TRADJENTA) 5 MG tablet Take 5 mg by mouth daily      metoprolol (TOPROL-XL) 50 MG XL tablet Take 50 mg by mouth every morning        !! - Potential duplicate medications found. Please discuss with provider. STOP taking these medications       glimepiride (AMARYL) 4 MG tablet Comments:   Reason for Stopping:             Activity: activity as tolerated  Diet: diabetic diet    Follow-up with PCP in 1 week.     Note that over 30 minutes was spent in preparing discharge papers, discussing discharge with patient, medication review, etc.    Signed:  Alexa Manrique MD    10/5/2021  1:56 PM no deformity, pain or tenderness. no restriction of movement

## 2021-10-20 ENCOUNTER — APPOINTMENT (OUTPATIENT)
Dept: THORACIC SURGERY | Facility: CLINIC | Age: 56
End: 2021-10-20
Payer: MEDICAID

## 2021-10-20 VITALS
BODY MASS INDEX: 24.98 KG/M2 | DIASTOLIC BLOOD PRESSURE: 85 MMHG | OXYGEN SATURATION: 97 % | TEMPERATURE: 98.5 F | RESPIRATION RATE: 18 BRPM | HEART RATE: 91 BPM | HEIGHT: 63 IN | SYSTOLIC BLOOD PRESSURE: 127 MMHG | WEIGHT: 141 LBS

## 2021-10-20 PROCEDURE — 99024 POSTOP FOLLOW-UP VISIT: CPT

## 2021-10-20 RX ORDER — TRAMADOL HYDROCHLORIDE 50 MG/1
50 TABLET, COATED ORAL
Qty: 16 | Refills: 0 | Status: DISCONTINUED | COMMUNITY
Start: 2021-09-22 | End: 2021-10-20

## 2021-10-20 RX ORDER — GABAPENTIN 100 MG/1
100 CAPSULE ORAL
Qty: 74 | Refills: 0 | Status: COMPLETED | COMMUNITY
Start: 2021-09-29 | End: 2021-10-20

## 2021-10-20 RX ORDER — TRAMADOL HYDROCHLORIDE 50 MG/1
50 TABLET, COATED ORAL
Qty: 20 | Refills: 0 | Status: DISCONTINUED | COMMUNITY
Start: 2021-09-08 | End: 2021-10-20

## 2021-11-03 ENCOUNTER — APPOINTMENT (OUTPATIENT)
Dept: PHYSICAL MEDICINE AND REHAB | Facility: CLINIC | Age: 56
End: 2021-11-03
Payer: MEDICAID

## 2021-11-03 VITALS
DIASTOLIC BLOOD PRESSURE: 94 MMHG | BODY MASS INDEX: 25.74 KG/M2 | SYSTOLIC BLOOD PRESSURE: 148 MMHG | HEART RATE: 92 BPM | WEIGHT: 145.28 LBS | OXYGEN SATURATION: 97 % | RESPIRATION RATE: 17 BRPM

## 2021-11-03 DIAGNOSIS — R91.8 OTHER NONSPECIFIC ABNORMAL FINDING OF LUNG FIELD: ICD-10-CM

## 2021-11-03 PROCEDURE — 99205 OFFICE O/P NEW HI 60 MIN: CPT

## 2021-11-03 NOTE — HISTORY OF PRESENT ILLNESS
[FreeTextEntry1] : Radha Philip is a 55 yo F pmh htn, dm2, s/p bronchoscopy, R video thoracoscopy and open R upper lobectomy 8/20/21 for adenocarcinoma.  Patient states pain is improving, she takes only tylenol. She had taken gabapentin inconsistently and states she stopped it when she ran out.  She reports some R sided stiffness, numbness, heaviness, and shooting pain.   Pain is worse with activity, comes "out of the blue", up to 5-6/10. \par \par She also reports SOB with mobility and activity and mild cough.   She is accompanied by her  today.\par \zaynab Lives in house with her .  Their home has 2nd story but she stays mostly on main level. House has 5 stairs to enter. Denies falls but reports difficulty with endurance and reports generalized weakness.  Also wakes up from sleep due to pain.   She works in a  but has not been able to work due to her pain and shortness of breath.     \par \par

## 2021-11-03 NOTE — PHYSICAL EXAM
[FreeTextEntry1] : GEN: AAOx3, NAD.\par PSYCH: Normal mood and affect. Responds appropriately to commands.\par EYES: Sclerae Anicteric. No discharge. EOMI.\par RESP: Breathing unlabored.\par CV: Radial pulses 2+ and equal. No varicosities noted.\par EXT: No C/C/E. \par SKIN:  R posterior lobectomy scar well healed, 2 anterior chest incisions well healed, mild sensitivity around incisions.   mild numbness below R breast. \par LYMPH: No supraclavicular, anterior or posterior cervical lymphadenopathy appreciated.\par GAIT: Non antalgic, Normal reciprocating heel to toe.\par STRENGTH: 4+/5 bilateral upper extremities\par REFLEXES: 2+ symmetric brachioradialis\par SENSATION: Grossly intact to light touch bilateral upper extremities.\par INSP: no visible swelling appreciated  \par SHOULDER ROM: pain at ends of ROM on the R\par PALP: mild tenderness to palpation R upper back, also near healed incisions.\par \par

## 2021-11-03 NOTE — ASSESSMENT
[FreeTextEntry1] : 55 yo f s/p lobectomy, with post thoracotomy pain, limited endurance and SOB\par -pain gradually improving but still having neuropathic pain, up to 5-6/10, interferes with sleep.\par - patient tried gabapentin in the past but did not take it consistently and stopped taking it.  New rx sent to pharmacy, patient instructed to start with 100mg at bedtime, gradually increase as tolerated, and take every day. Patient previously had difficulty with dizziness but denies feeling dizzy today. \par -tylenol prn\par -will prescribe PT for endurance, strengthening, ROM, balance, however patient states she would like to start gabapentin first. \par -follow up in 2-4 weeks\par I spent a total of 60 minutes on this encounter including documentation, face to face time, care coordination and reviewing prior records.\par

## 2021-11-16 NOTE — ASU PATIENT PROFILE, ADULT - NS PRO TALK SOMEONE YN
Date of Service: 2021    SUBJECTIVE:   I had the pleasure of clinic consultation with this gentleman and his wife today.  They come from [place].  Please review the chart for all the details; however, he presents with a history of liver lesion in the right lobe that was biopsy-proven hepatocellular carcinoma and his alpha-fetoprotein is 6000.    The patient denies any significant alcohol use and denies any history of liver specific disease.  We did discuss that the majority of these tumors do occur in the background of cirrhosis; however, about 10-15% of patients can have de owen hepatocellular carcinoma.    Based on review of his platelet count and his MELD, it does not appear to be cirrhotic.    I showed the patient and his wife the MRI images.  We discussed in the setting of his renal disease and his other medical comorbidities that the option of transplant at his age would be suboptimal; however, potential consideration of surgical resection may be an option in the future.  For now, we will start with the followin.  IR consultation.  2.  Discussion at our weekly liver hepatoma tumor conference.  3.  The lesion would be amenable to either percutaneous ablation and/or embolization with chemoembolization or radioembolization and/or both.    At this point, we will track progress with following the tumor marker and imaging.    Based on discussion with the patient, will coordinate for multidisciplinary tumor discussion and plan for IR consultation and treatment, likely at [place] or [place] based on patient preference.    Total time spent with the patient and wife in counseling and reviewing the imaging and discussing the care plan was 30 minutes.      Dictated By: Cricket Jones MD  Signing Provider: Cricket Jones MD    AS/nellie ([id number])  DD: 2021 10:32:08 TD: 2021 10:48:16    Copy Sent To:     cc: Selin Guajardo MD  
no

## 2021-11-17 ENCOUNTER — APPOINTMENT (OUTPATIENT)
Dept: CT IMAGING | Facility: IMAGING CENTER | Age: 56
End: 2021-11-17
Payer: MEDICAID

## 2021-11-17 ENCOUNTER — OUTPATIENT (OUTPATIENT)
Dept: OUTPATIENT SERVICES | Facility: HOSPITAL | Age: 56
LOS: 1 days | End: 2021-11-17
Payer: MEDICAID

## 2021-11-17 DIAGNOSIS — Z90.49 ACQUIRED ABSENCE OF OTHER SPECIFIED PARTS OF DIGESTIVE TRACT: Chronic | ICD-10-CM

## 2021-11-17 DIAGNOSIS — C34.91 MALIGNANT NEOPLASM OF UNSPECIFIED PART OF RIGHT BRONCHUS OR LUNG: ICD-10-CM

## 2021-11-17 DIAGNOSIS — G89.18 OTHER ACUTE POSTPROCEDURAL PAIN: ICD-10-CM

## 2021-11-17 PROCEDURE — 71250 CT THORAX DX C-: CPT | Mod: 26

## 2021-11-17 PROCEDURE — 71250 CT THORAX DX C-: CPT

## 2021-11-18 PROBLEM — C34.91 ADENOCARCINOMA OF LUNG, RIGHT: Status: ACTIVE | Noted: 2021-09-29

## 2021-11-24 ENCOUNTER — APPOINTMENT (OUTPATIENT)
Dept: THORACIC SURGERY | Facility: CLINIC | Age: 56
End: 2021-11-24
Payer: MEDICAID

## 2021-11-24 VITALS
HEART RATE: 94 BPM | BODY MASS INDEX: 27.56 KG/M2 | OXYGEN SATURATION: 98 % | HEIGHT: 61 IN | RESPIRATION RATE: 18 BRPM | DIASTOLIC BLOOD PRESSURE: 83 MMHG | WEIGHT: 146 LBS | TEMPERATURE: 98.6 F | SYSTOLIC BLOOD PRESSURE: 142 MMHG

## 2021-11-24 DIAGNOSIS — C34.91 MALIGNANT NEOPLASM OF UNSPECIFIED PART OF RIGHT BRONCHUS OR LUNG: ICD-10-CM

## 2021-11-24 PROCEDURE — 99213 OFFICE O/P EST LOW 20 MIN: CPT

## 2021-11-29 NOTE — CONSULT LETTER
[Dear  ___] : Dear  [unfilled], [Consult Letter:] : I had the pleasure of evaluating your patient, [unfilled]. [( Thank you for referring [unfilled] for consultation for _____ )] : Thank you for referring [unfilled] for consultation for [unfilled] [Please see my note below.] : Please see my note below. [Consult Closing:] : Thank you very much for allowing me to participate in the care of this patient.  If you have any questions, please do not hesitate to contact me. [Sincerely,] : Sincerely, [FreeTextEntry2] : Dr. Tania Mcarthur (PULM)  [FreeTextEntry3] : Gato Barrios MD, FACS \par , Thoracic Surgery, [facility]\par Chief of Division of Thoracic Surgery, [facility]\par Department of Cardiovascular & Thoracic Surgery \par , [facility] [employer] at [facility]\par

## 2021-11-29 NOTE — HISTORY OF PRESENT ILLNESS
[FreeTextEntry1] : Ms. JUAN MIGUEL GARCIA, 56 year old female, never a smoker, originally from [place]. Immigrated to the [place] years ago; PmHx of Type 2 DM, hypertension and treatment for latent TB infection (2007); Recent CT chest on 6/2021 showing posterior RUL opacity. Now, s/p transbronchial biopsy on 7/2/2021 w/ Path positive for malignant cells. Referred to office by Dr. Mcarthur for surgical evaluation. \par \par 8/20/21- Now s/p Bronchoscopy, right video thoracoscopy robotic assisted, and open right upper lobectomy. Path of RUL lobectomy revealed adenocarcinoma, acinar predominant, lepidic, G2, 3.8 cm, All margins negative, LN negative (0/8), pT2aN0 Stage IB. \par \par Of note, she called 10/13/21 c/o persistent right anterior chest pain and arm weakness, says she is unable to go back to work (works at a ). I referred her to PM&R, but she has not yet made appointment as of today.\par \par Last visit 10/20/21- she has not been taking Gabapentin 100mg TID, as prescribed. She is still taking Tramadol as needed, c/o weakness, dizziness, and constipation, I explained to her that her s/s are likely r/t side effects of Tramadol, and she should stop taking narcotics. I prescribed Gabapentin 300mg BID, detail instructions given, explained to her that she cannot take it as needed. I referred her again to Pain Management, and strongly recommended her to make an appointment for poor pain control. I have also ordered a CT Chest w/o contrast to be done mid-Nov, and instructed her to RTC after the CT scan, she requested another 6 months disability, but I will re-evaluate her condition when she returns to office in Nov. \par \par CT Chest on 11/17/21:\par - PRAVEENA\par \par Patient is here today for a follow up. She established care with Dr. Garcia of PM&R 11/23/21 who per patient is recommending PT twice a week for SOB and leg weakness. She reports right anterior chest pain and numbness has improved by 60% although she continues to experience intermittent right anterior chest numbness and shoulder pain which radiates to her back when she lifts anything, even when she pulls up her pants. She continues Gabapentin at a lower dose 100mg TID-taking it BID with relief. She is off of Tramadol. \par

## 2021-11-29 NOTE — ASSESSMENT
[FreeTextEntry1] : Ms. JUAN MIGUEL GARCIA, 56 year old female, hx lung cancer s/p s/p Bronchoscopy, right video thoracoscopy robotic assisted, and open right upper lobectomy on 8/20/21. Path of RUL lobectomy revealed adenocarcinoma, acinar predominant, lepidic, G2, 3.8 cm, All margins negative, LN negative (0/8), pT2aN0 Stage IB. \par \par I have independently reviewed the patient's medical records and diagnostic images at time of this office visit. She endorse 60% improvement of right chest neuropathic pain, currently off Tramadol and taking only low dose Gabapentin. She has established care with PM&R and will start PT twice a week. I have instructed her to return to office for clinical assessment in February. She is likely to return back to work in the new year. I encouraged her to increase her level of activity and ambulate daily. Continue f/u with Dr. Garcia. Next CT Chest June 2022.\par \par I personally performed the services described in the documentation, reviewed the documentation recorded by the scribe in my presence and it accurately and completely records my words and actions.\par \par JETHRO, ROC Copeland, am scribing for and in the presence of ALICIA Corcoran, the following sections HISTORY OF PRESENT ILLNESS, PAST MEDICAL/FAMILY/SOCIAL HISTORY; REVIEW OF SYSTEMS; VITAL SIGNS; PHYSICAL EXAM; DISPOSITION.\par

## 2021-12-08 ENCOUNTER — APPOINTMENT (OUTPATIENT)
Dept: PHYSICAL MEDICINE AND REHAB | Facility: CLINIC | Age: 56
End: 2021-12-08
Payer: MEDICAID

## 2021-12-08 VITALS
RESPIRATION RATE: 17 BRPM | WEIGHT: 147.38 LBS | TEMPERATURE: 97.34 F | DIASTOLIC BLOOD PRESSURE: 100 MMHG | BODY MASS INDEX: 27.85 KG/M2 | HEART RATE: 84 BPM | SYSTOLIC BLOOD PRESSURE: 162 MMHG | OXYGEN SATURATION: 100 %

## 2021-12-08 PROCEDURE — 99214 OFFICE O/P EST MOD 30 MIN: CPT

## 2021-12-08 NOTE — PHYSICAL EXAM
[FreeTextEntry1] : GEN: AAOx3, NAD.\par PSYCH: Normal mood and affect. Responds appropriately to commands.\par EYES: Sclerae Anicteric. No discharge. EOMI.\par RESP: Breathing unlabored.\par CV: Radial pulses 2+ and equal. No varicosities noted.\par EXT: No C/C/E. \par SKIN: R posterior lobectomy scar well healed, 2 anterior chest incisions well healed, mild sensitivity around incisions. mild numbness below R breast. \par LYMPH: No supraclavicular, anterior or posterior cervical lymphadenopathy appreciated.\par GAIT: Non antalgic, Normal reciprocating heel to toe.\par STRENGTH: 4+/5 bilateral upper extremities\par REFLEXES: 2+ symmetric brachioradialis\par SENSATION: Grossly intact to light touch bilateral upper extremities.\par INSP: no visible swelling appreciated \par SHOULDER ROM: pain at ends of ROM on the R\par PALP: + hypertonicity with tenderness to palpation R upper back \par

## 2021-12-08 NOTE — ASSESSMENT
[FreeTextEntry1] : 57 yo f pmh adenocarcinoma of the lung, presenting for follow up of pain, limited endurance, weakness. \par - reports improvement in SOB and endurance with PT, would like to continue however with worsening R upper back muscle spasms\par - also reports dizziness- will change gabapentin to 100mg twice a day to monitor for dizziness, if continues can decrease to once a day\par -patient had elevated bp today, states she will follow up with her pmd, patient reports she ate chips today, will monitor.  \par - continue PT, will add myofascial treatment, stretching for muscle spasm. patient prefers not to have trigger point injections. will try heat prn\par - will hold off on muscle relaxants due to dizziness\par -follow up in 4 weeks\par \par I spent a total of 30  minutes on this encounter including documentation, face to face time, care coordination and reviewing prior records.\par

## 2021-12-08 NOTE — HISTORY OF PRESENT ILLNESS
[FreeTextEntry1] : Radha Philip is a 55 yo F pmh htn, dm2, s/p bronchoscopy, R video thoracoscopy and open R upper lobectomy 8/20/21 for adenocarcinoma. At last visit, patient had stopped gabapentin when she ran out, and was restarted on medication. She states pain has improved, up to 4/10 pain, with improvement in shooting pain. She reports sided chest and upper back sided stiffness, numbness, heaviness, and shooting pain. Pain is overall improving, \par She takes gabapentin 200mg at bedtime but reports dizziness at times.   She reports R upper back pain today which feels like a muscle spasm.  She is attending PT however focused on endurance, SOB, strengthening.  \par She reports SOB improving, cough resolved. \par  \par \par

## 2022-01-12 ENCOUNTER — APPOINTMENT (OUTPATIENT)
Dept: PHYSICAL MEDICINE AND REHAB | Facility: CLINIC | Age: 57
End: 2022-01-12

## 2022-02-23 ENCOUNTER — APPOINTMENT (OUTPATIENT)
Dept: THORACIC SURGERY | Facility: CLINIC | Age: 57
End: 2022-02-23
Payer: MEDICAID

## 2022-02-23 ENCOUNTER — NON-APPOINTMENT (OUTPATIENT)
Age: 57
End: 2022-02-23

## 2022-02-23 VITALS
WEIGHT: 144 LBS | SYSTOLIC BLOOD PRESSURE: 148 MMHG | BODY MASS INDEX: 27.19 KG/M2 | DIASTOLIC BLOOD PRESSURE: 91 MMHG | HEART RATE: 83 BPM | OXYGEN SATURATION: 99 % | TEMPERATURE: 98.2 F | HEIGHT: 61 IN

## 2022-02-23 PROCEDURE — 99213 OFFICE O/P EST LOW 20 MIN: CPT

## 2022-02-23 RX ORDER — GABAPENTIN 100 MG/1
100 CAPSULE ORAL
Qty: 60 | Refills: 0 | Status: DISCONTINUED | COMMUNITY
Start: 2021-11-03 | End: 2022-02-23

## 2022-02-23 RX ORDER — GABAPENTIN 300 MG/1
300 CAPSULE ORAL TWICE DAILY
Qty: 60 | Refills: 3 | Status: DISCONTINUED | COMMUNITY
Start: 2021-10-20 | End: 2022-02-23

## 2022-02-23 RX ORDER — DOCUSATE SODIUM 100 MG
100 TABLET ORAL EVERY 8 HOURS
Qty: 90 | Refills: 0 | Status: DISCONTINUED | COMMUNITY
Start: 2021-09-29 | End: 2022-02-23

## 2022-02-25 NOTE — ASSESSMENT
[FreeTextEntry1] : Ms. JUAN MIGUEL GARCIA, 56 year old female, hx lung cancer s/p s/p Bronchoscopy, right video thoracoscopy robotic assisted, and open right upper lobectomy on 8/20/21. Path of RUL lobectomy revealed adenocarcinoma, acinar predominant, lepidic, G2, 3.8 cm, All margins negative, LN negative (0/8), pT2aN0 Stage IB. \par \par Overall she endorses neuropathic pain and breathing much improved. I encouraged her to continue sessions with Dr. Garcia, I think she would benefit from TENS. She will return to office in May with repeat CT Chest. \par \par \par I personally performed the services described in the documentation, reviewed the documentation recorded by the scribe in my presence and it accurately and completely records my words and actions.\par \par I, ROC Copeland, am scribing for and in the presence of ALICIA Corcoran, the following sections HISTORY OF PRESENT ILLNESS, PAST MEDICAL/FAMILY/SOCIAL HISTORY; REVIEW OF SYSTEMS; VITAL SIGNS; PHYSICAL EXAM; DISPOSITION.\par  \par \par

## 2022-02-25 NOTE — CONSULT LETTER
[FreeTextEntry3] : Gato Barrios MD, FACS \par , Thoracic Surgery, [facility]\par Chief of Division of Thoracic Surgery, [facility]\par Department of Cardiovascular & Thoracic Surgery \par , [facility] [employer] at [facility]\par   [FreeTextEntry2] : Dr. Tania Mcarthur (PULM)

## 2022-02-25 NOTE — HISTORY OF PRESENT ILLNESS
[FreeTextEntry1] : Ms. JUAN MIGUEL GARCIA, 56 year old female, never a smoker, originally from [place]. Immig[place] to the [place] ago; PmHx of Type 2 DM, hypertension and treatment for latent TB infection (2007); Recent CT chest on 6/2021 showing posterior RUL opacity. Now, s/p transbronchial biopsy on 7/2/2021 w/ Path positive for malignant cells. Referred to office by Dr. Mcarthur for surgical evaluation. \par \par 8/20/21- Now s/p Bronchoscopy, right video thoracoscopy robotic assisted, and open right upper lobectomy. Path of RUL lobectomy revealed adenocarcinoma, acinar predominant, lepidic, G2, 3.8 cm, All margins negative, LN negative (0/8), pT2aN0 Stage IB. \par \par Of note, she called 10/13/21 c/o persistent right anterior chest pain and arm weakness, says she is unable to go back to work (works at a ). I referred her to PM&R, but she has not yet made appointment as of today.\par \par 10/20/21- she has not been taking Gabapentin 100mg TID, as prescribed. She is still taking Tramadol as needed, c/o weakness, dizziness, and constipation, I explained to her that her s/s are likely r/t side effects of Tramadol, and she should stop taking narcotics. I prescribed Gabapentin 300mg BID, detail instructions given, explained to her that she cannot take it as needed. I referred her again to Pain Management, and strongly recommended her to make an appointment for poor pain control. I have also ordered a CT Chest w/o contrast to be done mid-Nov, and instructed her to RTC after the CT scan, she requested another 6 months disability, but I will re-evaluate her condition when she returns to office in Nov. \par \par CT Chest on 11/17/21:\par - PRAVEENA\par \par 11/2021-She established care with Dr. Garcia of PM&R 11/23/21 who per patient is recommending PT twice a week for SOB and leg weakness. Today she reports right anterior chest pain and numbness has improved by 60% although she continues to experience intermittent right anterior chest numbness and shoulder pain which radiates to her back when she lifts anything, even when she pulls up her pants. She continues Gabapentin at a lower dose 100mg TID-taking it BID with relief. She is off of Tramadol. \par \par Patient is here today for a follow up, no imaging. She has been following up with Dr. Garcia (PM&R), last visit 12/8/21 "reports improvement in SOB and endurance with PT, would like to continue however with worsening R upper back muscle spasms, also reports dizziness- will change gabapentin to 100mg twice a day to monitor for dizziness, if continues can decrease to once a day. Recommended to continue PT, will add myofascial treatment, stretching for muscle spasm, patient prefers not to have trigger point injections-f/u in Jan 2021"-patient admits to not following up secondary to omicron surge and her mothers passing a week ago. Overall she endorses her pain has improved now 3-4/10, able to lift arms over her head, takes very little Gabapentin. Saw PCP for dizziness and was referred to ENT to r/o vertigo.\par \par \par

## 2022-04-26 ENCOUNTER — APPOINTMENT (OUTPATIENT)
Dept: MAMMOGRAPHY | Facility: IMAGING CENTER | Age: 57
End: 2022-04-26
Payer: MEDICAID

## 2022-04-26 ENCOUNTER — APPOINTMENT (OUTPATIENT)
Dept: RADIOLOGY | Facility: IMAGING CENTER | Age: 57
End: 2022-04-26
Payer: MEDICAID

## 2022-04-26 ENCOUNTER — OUTPATIENT (OUTPATIENT)
Dept: OUTPATIENT SERVICES | Facility: HOSPITAL | Age: 57
LOS: 1 days | End: 2022-04-26
Payer: MEDICAID

## 2022-04-26 ENCOUNTER — APPOINTMENT (OUTPATIENT)
Dept: ULTRASOUND IMAGING | Facility: IMAGING CENTER | Age: 57
End: 2022-04-26
Payer: MEDICAID

## 2022-04-26 DIAGNOSIS — Z00.8 ENCOUNTER FOR OTHER GENERAL EXAMINATION: ICD-10-CM

## 2022-04-26 DIAGNOSIS — Z90.49 ACQUIRED ABSENCE OF OTHER SPECIFIED PARTS OF DIGESTIVE TRACT: Chronic | ICD-10-CM

## 2022-04-26 PROCEDURE — 76641 ULTRASOUND BREAST COMPLETE: CPT | Mod: 26,50

## 2022-04-26 PROCEDURE — 72110 X-RAY EXAM L-2 SPINE 4/>VWS: CPT | Mod: 26

## 2022-04-26 PROCEDURE — 72110 X-RAY EXAM L-2 SPINE 4/>VWS: CPT

## 2022-04-26 PROCEDURE — 77067 SCR MAMMO BI INCL CAD: CPT | Mod: 26

## 2022-04-26 PROCEDURE — 76641 ULTRASOUND BREAST COMPLETE: CPT

## 2022-04-26 PROCEDURE — 77063 BREAST TOMOSYNTHESIS BI: CPT | Mod: 26

## 2022-04-26 PROCEDURE — 77063 BREAST TOMOSYNTHESIS BI: CPT

## 2022-04-26 PROCEDURE — 77067 SCR MAMMO BI INCL CAD: CPT

## 2022-05-18 ENCOUNTER — OUTPATIENT (OUTPATIENT)
Dept: OUTPATIENT SERVICES | Facility: HOSPITAL | Age: 57
LOS: 1 days | End: 2022-05-18
Payer: MEDICAID

## 2022-05-18 ENCOUNTER — APPOINTMENT (OUTPATIENT)
Dept: CT IMAGING | Facility: IMAGING CENTER | Age: 57
End: 2022-05-18
Payer: MEDICAID

## 2022-05-18 DIAGNOSIS — C80.1 MALIGNANT (PRIMARY) NEOPLASM, UNSPECIFIED: ICD-10-CM

## 2022-05-18 DIAGNOSIS — Z00.8 ENCOUNTER FOR OTHER GENERAL EXAMINATION: ICD-10-CM

## 2022-05-18 DIAGNOSIS — Z90.49 ACQUIRED ABSENCE OF OTHER SPECIFIED PARTS OF DIGESTIVE TRACT: Chronic | ICD-10-CM

## 2022-05-18 PROCEDURE — 71250 CT THORAX DX C-: CPT

## 2022-05-18 PROCEDURE — 71250 CT THORAX DX C-: CPT | Mod: 26

## 2022-06-01 ENCOUNTER — APPOINTMENT (OUTPATIENT)
Dept: THORACIC SURGERY | Facility: CLINIC | Age: 57
End: 2022-06-01
Payer: MEDICAID

## 2022-06-01 VITALS
HEIGHT: 61 IN | HEART RATE: 85 BPM | SYSTOLIC BLOOD PRESSURE: 161 MMHG | BODY MASS INDEX: 26.81 KG/M2 | OXYGEN SATURATION: 97 % | WEIGHT: 142 LBS | DIASTOLIC BLOOD PRESSURE: 93 MMHG

## 2022-06-01 PROCEDURE — 99214 OFFICE O/P EST MOD 30 MIN: CPT

## 2022-06-01 NOTE — PHYSICAL EXAM
[] : no respiratory distress [Respiration, Rhythm And Depth] : normal respiratory rhythm and effort [Exaggerated Use Of Accessory Muscles For Inspiration] : no accessory muscle use [Auscultation Breath Sounds / Voice Sounds] : lungs were clear to auscultation bilaterally [Examination Of The Chest] : the chest was normal in appearance [Chest Visual Inspection Thoracic Asymmetry] : no chest asymmetry [Diminished Respiratory Excursion] : normal chest expansion [Cervical Lymph Nodes Enlarged Posterior Bilaterally] : posterior cervical [Cervical Lymph Nodes Enlarged Anterior Bilaterally] : anterior cervical [Supraclavicular Lymph Nodes Enlarged Bilaterally] : supraclavicular [Involuntary Movements] : no involuntary movements were seen [Skin Color & Pigmentation] : normal skin color and pigmentation [Oriented To Time, Place, And Person] : oriented to person, place, and time

## 2022-06-03 NOTE — ASSESSMENT
[FreeTextEntry1] : Ms. JUAN MIGUEL GARCIA, 56 year old female, hx lung cancer s/p s/p Bronchoscopy, right video thoracoscopy robotic assisted, and open right upper lobectomy on 8/20/21. Path of RUL lobectomy revealed adenocarcinoma, acinar predominant, lepidic, G2, 3.8 cm, All margins negative, LN negative (0/8), pT2aN0 Stage IB. \par \par Here today for follow up. \par \par I have independently reviewed the medical records and imaging at the time of this office consultation. CT Chest with stable, post op findings. Recommendation to return to clinic in 12 months with repeat CT chest, no contrast, to re-evaluate stability. \par \par Recommendations reviewed with patient during this office visit, and all questions answered; Patient instructed on the importance of follow up and verbalizes understanding.\par \par I personally performed the services described in the documentation, reviewed the documentation recorded by the scribe in my presence and it accurately and completely records my words and actions.\par \par I, Marina Camacho ANP-C, am scribing for and the presence of ALICIA Corcoran, the following sections HISTORY OF PRESENT ILLNESS, PAST MEDICAL/FAMILY/SOCIAL HISTORY; REVIEW OF SYSTEMS; VITAL SIGNS; PHYSICAL EXAM; DISPOSITION.\par \par

## 2022-06-03 NOTE — CONSULT LETTER
[FreeTextEntry2] : Dr. Tania Mcarthur (PULM)  [FreeTextEntry3] : Gato Barrios MD, FACS \par Vice Chairperson, Thoracic Surgery, [facility] Department of Cardiovascular & Thoracic Surgery \par , [employer] at [facility]

## 2022-06-03 NOTE — HISTORY OF PRESENT ILLNESS
[FreeTextEntry1] : Ms. JUAN MIGUEL GARCIA, 56 year old female, never a smoker, originally from [place]. [place] the [place] ago; PmHx of Type 2 DM, hypertension and treatment for latent TB infection (2007); Recent CT chest on 6/2021 showing posterior RUL opacity. Now, s/p transbronchial biopsy on 7/2/2021 w/ Path positive for malignant cells. Referred to office by Dr. Mcarthur for surgical evaluation. \par \par 8/20/21- Now s/p Bronchoscopy, right video thoracoscopy robotic assisted, and open right upper lobectomy. Path of RUL lobectomy revealed adenocarcinoma, acinar predominant, lepidic, G2, 3.8 cm, All margins negative, LN negative (0/8), pT2aN0 Stage IB. \par \par Of note, she called 10/13/21 c/o persistent right anterior chest pain and arm weakness, says she is unable to go back to work (works at a ). I referred her to PM&R, but she has not yet made appointment as of today.\par \par 10/20/21- she has not been taking Gabapentin 100mg TID, as prescribed. She is still taking Tramadol as needed, c/o weakness, dizziness, and constipation, I explained to her that her s/s are likely r/t side effects of Tramadol, and she should stop taking narcotics. I prescribed Gabapentin 300mg BID, detail instructions given, explained to her that she cannot take it as needed. I referred her again to Pain Management, and strongly recommended her to make an appointment for poor pain control. I have also ordered a CT Chest w/o contrast to be done mid-Nov, and instructed her to RTC after the CT scan, she requested another 6 months disability, but I will re-evaluate her condition when she returns to office in Nov. \par \par CT Chest on 11/17/21:\par - PRAVEENA\par \par 11/2021-She established care with Dr. Garcia of PM&R 11/23/21 who per patient is recommending PT twice a week for SOB and leg weakness. Today she reports right anterior chest pain and numbness has improved by 60% although she continues to experience intermittent right anterior chest numbness and shoulder pain which radiates to her back when she lifts anything, even when she pulls up her pants. She continues Gabapentin at a lower dose 100mg TID-taking it BID with relief. She is off of Tramadol. \par \par CT Chest on 5/18/22:\par - Right upper lobectomy with stable postoperative changes.\par - No evidence of thoracic tumor recurrence or metastasis.\par \par Patient is here today for a follow up. Neuropathic pain and breathing much improved. Endorses mild epsiode of COVID-19 a few weeks ago. Today, patient denies worsening SOB, chest pain, cough, hemoptysis, fever, chills, night sweats, lightheadedness or dizziness.\par \par \par \par \par

## 2022-07-09 NOTE — PATIENT PROFILE ADULT - ...
Transplant Surgery Multidisciplinary Progress Note   --------------------------------------------------------------  R DCD DDRT    4/21/22    Present: Patient seen and examined with multidisciplinary team including Transplant Surgeon: Dr. Barber,  Nephrologist: WALDO Rodriguez, unit RN during am rounds.  Disciplines not in attendance will be notified of the plan.     HPI: 68M with PMH: DM type II, HTN, CAD s/p CABG in 2020, AFib on Eliquis, CVA in 2019 due to Afib, Seizure d/o following CVA, last episode was on 4/8/22, h/o GIB in 2/2022 EGD with duodenal ulcer.  ESRD due to DM was on HD since 2019.     s/p R DCD DDRT on 4/21/22.  Donor was 58 , KDPI 82%, DCD, single vessels and ureter, HLA mismatch 1, 2, 2. No DSA, cPRA 0%. CMV +/+  Course complicated by DGF, was on HD until 4/29/22. Re-admitted in May for anemia in the setting of large perinephric hematoma s/p evacuation and repair of arterial anastomosis on 5/2/22. Intra operative biopsy showed no rejection, Creatinine ranging ~2mg/dL.    In Rehab: He was recently dx with klebsiella UTI rx with ertapenem - then switched to Levaquin day #6.    He also had parainfluenza pneumonia. Was at rehab progressing well.      Hospital course:  - 6/10: transferred from rehab facility for seizure status epilepticus. Intubated for respiratory protection and transferred to MICU.  EEG showed no seizure activity. Neuro consulted.   - 6/11: Extubated. Found to have R pleural effusion. s/p Thoracentesis 1.3L. Eliquis changed to heparin drip.  Post procedure drop in H&H. 5u PRBC, 2 u FFP.    - 6/11 evening: Transferred to SICU for further care in setting of hypoxia, significant R pleural effusion, hgb 6 and hemodynamic instability  - 6/11 Intubated at 9pm and sedated on Precedex.  CT placed, 600ml blood drained.  1L total overnight, started pressors  - 6/11 Received Protamine for PTT >100 (was on Heparin drip started for AF), 2 u FFP and 5u PRBC total  - 6/13 s/p VATS 2.2L old blood removed; second chest tube placed  - 6/18-19: chest tubes removed  - 6/21: ?PRES on MRI, off Envarsus, switched to Belatacept 6/23  - 6/25: Tx to SICU for refractory seizures    Interval Events:  - Afebrile, VSS  - mentation with much improvement over the last 2 days, sleeping more through the night with Melatonin  - tolerating TFs at goal      Immunosuppression:   Induction:  Thymoglobulin                                        Maintenance:  - Belatacept: 6/23, 7/4 (initial loading).  Re-loaded 7/8 as there was a gap between doses 2/2 seizures. next dose 7/18  - MMF 1g BID  - continue Pred 5  - Ongoing monitoring for signs of rejection.    Potential Discharge date: pending clinical improvement  Education:  Medications  Plan of care:  See Below      MEDICATIONS  (STANDING):  amLODIPine   Tablet 10 milliGRAM(s) Oral <User Schedule>  atorvastatin 40 milliGRAM(s) Oral at bedtime  atovaquone  Suspension 1500 milliGRAM(s) Oral <User Schedule>  chlorhexidine 2% Cloths 1 Application(s) Topical <User Schedule>  doxazosin 4 milliGRAM(s) Oral daily  enoxaparin Injectable 60 milliGRAM(s) SubCutaneous every 12 hours  epoetin cortney-epbx (RETACRIT) Injectable 75230 Unit(s) SubCutaneous every 7 days  finasteride 5 milliGRAM(s) Oral daily  fosphenytoin IVPB 100 milliGRAM(s) PE IV Intermittent every 12 hours  hydrALAZINE 100 milliGRAM(s) Oral every 8 hours  insulin lispro (ADMELOG) corrective regimen sliding scale   SubCutaneous every 6 hours  levothyroxine Injectable 40 MICROGram(s) IV Push at bedtime  melatonin 6 milliGRAM(s) Oral at bedtime  mycophenolate mofetil Suspension 1000 milliGRAM(s) Enteral Tube <User Schedule>  pantoprazole   Suspension 40 milliGRAM(s) Oral daily  polyethylene glycol 3350 17 Gram(s) Oral daily  predniSONE  Solution 5 milliGRAM(s) Oral daily  valGANciclovir 50 mG/mL Oral Solution 450 milliGRAM(s) Oral daily    MEDICATIONS  (PRN):  acetaminophen    Suspension .. 650 milliGRAM(s) Enteral Tube every 6 hours PRN Mild Pain (1 - 3)          Vital Signs Last 24 Hrs  T(C): 36.2 (09 Jul 2022 08:00), Max: 36.5 (08 Jul 2022 15:00)  T(F): 97.2 (09 Jul 2022 08:00), Max: 97.7 (08 Jul 2022 15:00)  HR: 59 (09 Jul 2022 12:00) (53 - 61)  BP: 156/67 (09 Jul 2022 12:00) (136/61 - 174/71)  BP(mean): 97 (09 Jul 2022 12:00) (87 - 120)  RR: 13 (09 Jul 2022 12:00) (13 - 27)  SpO2: 97% (09 Jul 2022 12:00) (96% - 100%)    Parameters below as of 09 Jul 2022 03:00  Patient On (Oxygen Delivery Method): room air        I&O's Summary    08 Jul 2022 07:01  -  09 Jul 2022 07:00  --------------------------------------------------------  IN: 1660 mL / OUT: 1255 mL / NET: 405 mL    09 Jul 2022 07:01  -  09 Jul 2022 13:53  --------------------------------------------------------  IN: 230 mL / OUT: 230 mL / NET: 0 mL                              9.3    5.05  )-----------( 303      ( 09 Jul 2022 06:02 )             30.3     07-09    140  |  105  |  22  ----------------------------<  161<H>  4.1   |  24  |  1.25    Ca    8.5      09 Jul 2022 06:02  Phos  3.7     07-09  Mg     2.1     07-09    TPro  5.8<L>  /  Alb  2.2<L>  /  TBili  0.2  /  DBili  0.1  /  AST  16  /  ALT  10  /  AlkPhos  144<H>  07-09                        REVIEW OF SYSTEMS  --------------------------------------------------------------------------------  unable to assess full ROM, +delirium  no obvious pain or discomfort      PHYSICAL EXAM:  Constitutional: Well developed / well nourished  Eyes: Anicteric, PERRLA  ENMT: nc/at  Neck: supple  Respiratory: CTA   Cardiovascular: RRR  Gastrointestinal: Soft, non distended, NT, RLQ incision healed   Genitourinary: voiding via tucker  Extremities: SCD's in place and working bilaterally, overall edema improving  Vascular: Palpable dp pulses bilaterally  Neurological: AAOx1-2  Skin: no rashes, ulcerations or lesions  Musculoskeletal: Moving all extremities, global weakness  Psychiatric: calm, starting to follow commands 20-Aug-2021 15:32:46

## 2022-08-17 ENCOUNTER — APPOINTMENT (OUTPATIENT)
Dept: PULMONOLOGY | Facility: CLINIC | Age: 57
End: 2022-08-17

## 2022-08-17 VITALS
OXYGEN SATURATION: 99 % | HEART RATE: 71 BPM | RESPIRATION RATE: 15 BRPM | SYSTOLIC BLOOD PRESSURE: 154 MMHG | HEIGHT: 61 IN | TEMPERATURE: 97.2 F | WEIGHT: 141.25 LBS | DIASTOLIC BLOOD PRESSURE: 97 MMHG | BODY MASS INDEX: 26.67 KG/M2

## 2022-08-17 DIAGNOSIS — Z85.118 PERSONAL HISTORY OF OTHER MALIGNANT NEOPLASM OF BRONCHUS AND LUNG: ICD-10-CM

## 2022-08-17 PROCEDURE — 99214 OFFICE O/P EST MOD 30 MIN: CPT

## 2022-08-17 NOTE — ASSESSMENT
[FreeTextEntry1] : 56 year old woman never smoker with history of LTBI diagnosed in 2007, reportedly treated but she is unclear about the details. She presented in July 2021 s/p hospitalization at [facility] for evaluation of persistent RUL opacity.  She had a CT chest in 2/2021 at outside pulmonologist that showed mixed gg and solid opacity in the RUL that is Pet positive on PET scan in 6/2021 (see scanned report) with SUV of 2.4.  There was no PEt uptake in LN of the chest.  Also seen was segment of abnormal sigmoid thickening with surrounding mesenteric infiltration and sigmoid mesenteric LN measuring up to 6mm in size, with similar metabolic uptake as in the rest of the colon.  Patient was admitted to rule out TB vs adenocardinoma.  Negative AFB x 3. She denied infectious symptoms but reported shortness of breath on exertion. Underwent bronchoscopy with biopsy on 7/2/21 which was inconclusive.  Subsequently referred to Dr. Barrios who performed right VATS, robotic assisted and open right upper lobectomy on 8/2021.  Pathology showed adenocarcinoma, acinar predominant, lepidic. 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  Her course was complicated by right chest neuropathic pain which is improved on low dose gabapentin.  \par \par Ct chest May 2022 shows post surgical changes and no active disease.\par \par She denies respiratory symptoms and remains active. \par \par We explained that post op neuropathic pain may be chronic. We recommend continued PT and gabapentin at bedtime. \par Follow up with annual CT chest as advised by Dr. Barrios.

## 2022-08-17 NOTE — HISTORY OF PRESENT ILLNESS
[TextBox_4] : 58 yo woman, origianlly from [place].  History of Type 2 DM, hypertension, treatment for latent TB infection, recent CT chest 6/2021 showing posterior ruight upper lobe opacity.  Patient denied any infectious symptoms, never used tobacco.  She had 3 sputum AFBs negative.  Underwent bronchoscopy with biopsy on 7/2/21 which was inconclusive.  Subsequently referred to Dr. Barrios who performed right VATS, robotic assisted and open right upper lobectomy on 8/2021.  Pathology showed adenocarcinoma, acinar predominant, lepidic. 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 \par \par Her course was complicated by right chest neuropathic pain which is improved on low dose gabapentin.  \par \par Returns for follow up today. wants to make sure her lungs are ok. stopped taking gabapentin due to dizziness. pain is on/off sharp.\par \par Denies shortness of breath. Remains active. No cough no phlegm.   Not on any inhalers.

## 2022-08-17 NOTE — END OF VISIT
[FreeTextEntry3] : I supervised, confirmed history and exam and discussed patient with AWILDA Davila.  [Time Spent: ___ minutes] : I have spent [unfilled] minutes of time on the encounter.

## 2022-09-13 ENCOUNTER — APPOINTMENT (OUTPATIENT)
Dept: RADIOLOGY | Facility: IMAGING CENTER | Age: 57
End: 2022-09-13

## 2022-09-13 ENCOUNTER — OUTPATIENT (OUTPATIENT)
Dept: OUTPATIENT SERVICES | Facility: HOSPITAL | Age: 57
LOS: 1 days | End: 2022-09-13
Payer: MEDICAID

## 2022-09-13 DIAGNOSIS — Z00.8 ENCOUNTER FOR OTHER GENERAL EXAMINATION: ICD-10-CM

## 2022-09-13 DIAGNOSIS — Z90.49 ACQUIRED ABSENCE OF OTHER SPECIFIED PARTS OF DIGESTIVE TRACT: Chronic | ICD-10-CM

## 2022-09-13 PROCEDURE — 71046 X-RAY EXAM CHEST 2 VIEWS: CPT

## 2022-09-13 PROCEDURE — 71046 X-RAY EXAM CHEST 2 VIEWS: CPT | Mod: 26

## 2022-11-03 ENCOUNTER — OUTPATIENT (OUTPATIENT)
Dept: OUTPATIENT SERVICES | Facility: HOSPITAL | Age: 57
LOS: 1 days | End: 2022-11-03
Payer: MEDICAID

## 2022-11-03 ENCOUNTER — APPOINTMENT (OUTPATIENT)
Dept: ULTRASOUND IMAGING | Facility: IMAGING CENTER | Age: 57
End: 2022-11-03

## 2022-11-03 ENCOUNTER — APPOINTMENT (OUTPATIENT)
Dept: MAMMOGRAPHY | Facility: IMAGING CENTER | Age: 57
End: 2022-11-03

## 2022-11-03 DIAGNOSIS — Z00.8 ENCOUNTER FOR OTHER GENERAL EXAMINATION: ICD-10-CM

## 2022-11-03 DIAGNOSIS — Z90.49 ACQUIRED ABSENCE OF OTHER SPECIFIED PARTS OF DIGESTIVE TRACT: Chronic | ICD-10-CM

## 2022-11-03 PROCEDURE — 76642 ULTRASOUND BREAST LIMITED: CPT | Mod: 26,RT

## 2022-11-03 PROCEDURE — 76642 ULTRASOUND BREAST LIMITED: CPT

## 2022-11-17 NOTE — ED ADULT TRIAGE NOTE - NS ED NOTE AC HIGH RISK COUNTRIES
This is a 48 y/o M current smoker with PMHx of HTN, HLD, COVID (infection 6 months ago and reinfection 1 month ago) presented to the ED for acute on chronic shortness of breath, suspected for heart failure exacerbation, admitted for diuresis. Cardiology consulted for management of new heart failure.         Vaibhav Uribe MD  Internal medicine, PGY-2 This is a 48 y/o M current smoker with PMHx of HTN, HLD, COVID (infection 6 months ago and reinfection 1 month ago) presented to the ED for acute on chronic shortness of breath, suspected for heart failure exacerbation, admitted for diuresis. Cardiology consulted for management of new heart failure.     #ADHF  - suspected etiology likely secondary alcohol use, other causes include COVID vs long-standing hypertension   - counseled patient to stop all alcohol use, he is agreeable  - plan for [facility] this admission to rule out ischemic etiology  - continue with IV lasix 40 BID for now  - strict I+Os, monitor UOP, goal 1-2L per day  - daily weights  - continue with metoprolol and losartan for GDMT    Vaibhav Uribe MD  Internal medicine, PGY-2 No

## 2023-03-21 ENCOUNTER — APPOINTMENT (OUTPATIENT)
Dept: PULMONOLOGY | Facility: CLINIC | Age: 58
End: 2023-03-21
Payer: MEDICAID

## 2023-03-21 VITALS
BODY MASS INDEX: 27.56 KG/M2 | RESPIRATION RATE: 15 BRPM | WEIGHT: 146 LBS | DIASTOLIC BLOOD PRESSURE: 75 MMHG | SYSTOLIC BLOOD PRESSURE: 117 MMHG | HEART RATE: 93 BPM | TEMPERATURE: 97.5 F | HEIGHT: 61 IN | OXYGEN SATURATION: 99 %

## 2023-03-21 DIAGNOSIS — G89.18 OTHER ACUTE POSTPROCEDURAL PAIN: ICD-10-CM

## 2023-03-21 DIAGNOSIS — C80.1 MALIGNANT (PRIMARY) NEOPLASM, UNSPECIFIED: ICD-10-CM

## 2023-03-21 DIAGNOSIS — M79.2 NEURALGIA AND NEURITIS, UNSPECIFIED: ICD-10-CM

## 2023-03-21 PROCEDURE — 99214 OFFICE O/P EST MOD 30 MIN: CPT

## 2023-03-21 NOTE — ASSESSMENT
[FreeTextEntry1] : 56 yo woman, origianlly from [place], [place]. History of Type 2 DM, hypertension, treatment for latent TB infection, recent CT chest 6/2021 showing posterior ruight upper lobe opacity. Patient denied any infectious symptoms, never used tobacco. She had 3 sputum AFBs negative. Underwent bronchoscopy with biopsy on 7/2/21 which was inconclusive. Subsequently referred to Dr. Barrios who performed right VATS, robotic assisted and open right upper lobectomy on 8/2021. Pathology showed adenocarcinoma, acinar predominant, lepidic. 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 Her course was complicated by right chest neuropathic pain. She was treated with low dose gabapentin but had to stop due to dizziness. \par \par Patient continues to have sharp neuropathic pain at the VATS sites. Denies shortness of breath, no cough no phlegm. Not on any inhalers. She has had no fevers, chills, night sweats, or recent unintentional weight loss. She is due for a repeat CT chest non 05/2023\par \par Long discussion with patient about her post VATS neuropathic pain. Reassurance provided. \par \par Plan:\par Repeat CT chest ordered for 05/2023\par Instructed patient to call to make follow up appointment with Dr Barrios\par RTC after CT chest performed\par \par She has persistent dizziness. Was taking meclizine which provided some relief however stopped. Recommended she discuss this with her PCP and perhaps a neurology referral.  \par

## 2023-03-21 NOTE — HISTORY OF PRESENT ILLNESS
[TextBox_4] : 58 yo woman, origianlly from [place], [place]. History of Type 2 DM, hypertension, treatment for latent TB infection, recent CT chest 6/2021 showing posterior ruight upper lobe opacity. Patient denied any infectious symptoms, never used tobacco. She had 3 sputum AFBs negative. Underwent bronchoscopy with biopsy on 7/2/21 which was inconclusive. Subsequently referred to Dr. Barrios who performed right VATS, robotic assisted and open right upper lobectomy on 8/2021. Pathology showed adenocarcinoma, acinar predominant, lepidic. 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 Her course was complicated by right chest neuropathic pain. She was treated with low dose gabapentin but had to stop due to dizziness. \par \par Patient continues to have sharp neuropathic pain at the VATS sites. Denies shortness of breath, no cough no phlegm. Not on any inhalers. She has had no fevers, chills, night sweats, or recent unintentional weight loss. She is due for a repeat CT chest non.\par \par \par She has persistent dizziness. Was taking meclizine which provided some relief however stopped. Recommended she discuss this with her PCP and perhaps a neurology referral.  \par

## 2023-06-25 NOTE — H&P ADULT - NSHPLABSRESULTS_GEN_ALL_CORE
12.8   5.85  )-----------( 232      ( 26 Jun 2021 21:50 )             39.0     06-26    141  |  105  |  14  ----------------------------<  98  4.2   |  23  |  0.72    Ca    9.6      26 Jun 2021 21:50    TPro  7.5  /  Alb  4.5  /  TBili  0.4  /  DBili  x   /  AST  19  /  ALT  28  /  AlkPhos  46  06-26    CAPILLARY BLOOD GLUCOSE      POCT Blood Glucose.: 120 mg/dL (26 Jun 2021 19:16)        Vital Signs Last 24 Hrs  T(C): 36.7 (26 Jun 2021 23:20), Max: 36.7 (26 Jun 2021 23:20)  T(F): 98.1 (26 Jun 2021 23:20), Max: 98.1 (26 Jun 2021 23:20)  HR: 78 (26 Jun 2021 23:20) (78 - 92)  BP: 143/83 (26 Jun 2021 23:20) (143/83 - 172/90)  BP(mean): --  RR: 17 (26 Jun 2021 23:20) (17 - 18)  SpO2: 100% (26 Jun 2021 23:20) (100% - 100%) [FreeTextEntry1] : Medications refilled.\par Blood work obtained.\par Will call with results of blood work.\par

## 2024-03-16 ENCOUNTER — APPOINTMENT (OUTPATIENT)
Dept: CT IMAGING | Facility: IMAGING CENTER | Age: 59
End: 2024-03-16

## 2024-05-18 ENCOUNTER — EMERGENCY (EMERGENCY)
Facility: HOSPITAL | Age: 59
LOS: 1 days | Discharge: HOME | End: 2024-05-18
Attending: PERSONAL EMERGENCY RESPONSE ATTENDANT | Admitting: STUDENT IN AN ORGANIZED HEALTH CARE EDUCATION/TRAINING PROGRAM
Payer: MEDICAID

## 2024-05-18 VITALS
SYSTOLIC BLOOD PRESSURE: 134 MMHG | RESPIRATION RATE: 16 BRPM | HEART RATE: 88 BPM | DIASTOLIC BLOOD PRESSURE: 82 MMHG | TEMPERATURE: 98 F | OXYGEN SATURATION: 99 %

## 2024-05-18 DIAGNOSIS — Z90.49 ACQUIRED ABSENCE OF OTHER SPECIFIED PARTS OF DIGESTIVE TRACT: Chronic | ICD-10-CM

## 2024-05-18 LAB
ALBUMIN SERPL ELPH-MCNC: 4.2 G/DL — SIGNIFICANT CHANGE UP (ref 3.3–5)
ALP SERPL-CCNC: 50 U/L — SIGNIFICANT CHANGE UP (ref 40–120)
ALT FLD-CCNC: 39 U/L — HIGH (ref 4–33)
ANION GAP SERPL CALC-SCNC: 15 MMOL/L — HIGH (ref 7–14)
AST SERPL-CCNC: 33 U/L — HIGH (ref 4–32)
BASE EXCESS BLDV CALC-SCNC: 0.8 MMOL/L — SIGNIFICANT CHANGE UP (ref -2–3)
BASOPHILS # BLD AUTO: 0.03 K/UL — SIGNIFICANT CHANGE UP (ref 0–0.2)
BASOPHILS NFR BLD AUTO: 0.6 % — SIGNIFICANT CHANGE UP (ref 0–2)
BILIRUB SERPL-MCNC: 0.3 MG/DL — SIGNIFICANT CHANGE UP (ref 0.2–1.2)
BLOOD GAS VENOUS COMPREHENSIVE RESULT: SIGNIFICANT CHANGE UP
BUN SERPL-MCNC: 12 MG/DL — SIGNIFICANT CHANGE UP (ref 7–23)
CALCIUM SERPL-MCNC: 9.1 MG/DL — SIGNIFICANT CHANGE UP (ref 8.4–10.5)
CHLORIDE BLDV-SCNC: 105 MMOL/L — SIGNIFICANT CHANGE UP (ref 96–108)
CHLORIDE SERPL-SCNC: 103 MMOL/L — SIGNIFICANT CHANGE UP (ref 98–107)
CO2 BLDV-SCNC: 27.3 MMOL/L — HIGH (ref 22–26)
CO2 SERPL-SCNC: 21 MMOL/L — LOW (ref 22–31)
CREAT SERPL-MCNC: 0.63 MG/DL — SIGNIFICANT CHANGE UP (ref 0.5–1.3)
D DIMER BLD IA.RAPID-MCNC: 223 NG/ML DDU — SIGNIFICANT CHANGE UP
EGFR: 103 ML/MIN/1.73M2 — SIGNIFICANT CHANGE UP
EOSINOPHIL # BLD AUTO: 0.06 K/UL — SIGNIFICANT CHANGE UP (ref 0–0.5)
EOSINOPHIL NFR BLD AUTO: 1.2 % — SIGNIFICANT CHANGE UP (ref 0–6)
GAS PNL BLDV: 137 MMOL/L — SIGNIFICANT CHANGE UP (ref 136–145)
GLUCOSE BLDV-MCNC: 205 MG/DL — HIGH (ref 70–99)
GLUCOSE SERPL-MCNC: 201 MG/DL — HIGH (ref 70–99)
HCO3 BLDV-SCNC: 26 MMOL/L — SIGNIFICANT CHANGE UP (ref 22–29)
HCT VFR BLD CALC: 34.7 % — SIGNIFICANT CHANGE UP (ref 34.5–45)
HCT VFR BLDA CALC: 34 % — LOW (ref 34.5–46.5)
HGB BLD CALC-MCNC: 11.2 G/DL — LOW (ref 11.7–16.1)
HGB BLD-MCNC: 11.3 G/DL — LOW (ref 11.5–15.5)
IANC: 2.09 K/UL — SIGNIFICANT CHANGE UP (ref 1.8–7.4)
IMM GRANULOCYTES NFR BLD AUTO: 0.2 % — SIGNIFICANT CHANGE UP (ref 0–0.9)
LACTATE BLDV-MCNC: 1.5 MMOL/L — SIGNIFICANT CHANGE UP (ref 0.5–2)
LYMPHOCYTES # BLD AUTO: 2.36 K/UL — SIGNIFICANT CHANGE UP (ref 1–3.3)
LYMPHOCYTES # BLD AUTO: 46.8 % — HIGH (ref 13–44)
MCHC RBC-ENTMCNC: 25.7 PG — LOW (ref 27–34)
MCHC RBC-ENTMCNC: 32.6 GM/DL — SIGNIFICANT CHANGE UP (ref 32–36)
MCV RBC AUTO: 78.9 FL — LOW (ref 80–100)
MONOCYTES # BLD AUTO: 0.49 K/UL — SIGNIFICANT CHANGE UP (ref 0–0.9)
MONOCYTES NFR BLD AUTO: 9.7 % — SIGNIFICANT CHANGE UP (ref 2–14)
NEUTROPHILS # BLD AUTO: 2.09 K/UL — SIGNIFICANT CHANGE UP (ref 1.8–7.4)
NEUTROPHILS NFR BLD AUTO: 41.5 % — LOW (ref 43–77)
NRBC # BLD: 0 /100 WBCS — SIGNIFICANT CHANGE UP (ref 0–0)
NRBC # FLD: 0 K/UL — SIGNIFICANT CHANGE UP (ref 0–0)
PCO2 BLDV: 43 MMHG — SIGNIFICANT CHANGE UP (ref 39–52)
PH BLDV: 7.39 — SIGNIFICANT CHANGE UP (ref 7.32–7.43)
PLATELET # BLD AUTO: 303 K/UL — SIGNIFICANT CHANGE UP (ref 150–400)
PO2 BLDV: 43 MMHG — SIGNIFICANT CHANGE UP (ref 25–45)
POTASSIUM BLDV-SCNC: 4 MMOL/L — SIGNIFICANT CHANGE UP (ref 3.5–5.1)
POTASSIUM SERPL-MCNC: 4 MMOL/L — SIGNIFICANT CHANGE UP (ref 3.5–5.3)
POTASSIUM SERPL-SCNC: 4 MMOL/L — SIGNIFICANT CHANGE UP (ref 3.5–5.3)
PROT SERPL-MCNC: 7.2 G/DL — SIGNIFICANT CHANGE UP (ref 6–8.3)
RBC # BLD: 4.4 M/UL — SIGNIFICANT CHANGE UP (ref 3.8–5.2)
RBC # FLD: 15 % — HIGH (ref 10.3–14.5)
SAO2 % BLDV: 67.5 % — SIGNIFICANT CHANGE UP (ref 67–88)
SODIUM SERPL-SCNC: 139 MMOL/L — SIGNIFICANT CHANGE UP (ref 135–145)
WBC # BLD: 5.04 K/UL — SIGNIFICANT CHANGE UP (ref 3.8–10.5)
WBC # FLD AUTO: 5.04 K/UL — SIGNIFICANT CHANGE UP (ref 3.8–10.5)

## 2024-05-18 PROCEDURE — 74176 CT ABD & PELVIS W/O CONTRAST: CPT | Mod: 26,MC

## 2024-05-18 PROCEDURE — 93010 ELECTROCARDIOGRAM REPORT: CPT

## 2024-05-18 PROCEDURE — 71250 CT THORAX DX C-: CPT | Mod: 26,MC

## 2024-05-18 PROCEDURE — 99223 1ST HOSP IP/OBS HIGH 75: CPT

## 2024-05-18 RX ORDER — ACETAMINOPHEN 500 MG
1000 TABLET ORAL ONCE
Refills: 0 | Status: COMPLETED | OUTPATIENT
Start: 2024-05-18 | End: 2024-05-18

## 2024-05-18 RX ORDER — CYCLOBENZAPRINE HYDROCHLORIDE 10 MG/1
10 TABLET, FILM COATED ORAL ONCE
Refills: 0 | Status: COMPLETED | OUTPATIENT
Start: 2024-05-18 | End: 2024-05-18

## 2024-05-18 RX ORDER — LIDOCAINE 4 G/100G
1 CREAM TOPICAL ONCE
Refills: 0 | Status: COMPLETED | OUTPATIENT
Start: 2024-05-18 | End: 2024-05-18

## 2024-05-18 RX ORDER — AMLODIPINE BESYLATE 2.5 MG/1
2.5 TABLET ORAL DAILY
Refills: 0 | Status: DISCONTINUED | OUTPATIENT
Start: 2024-05-18 | End: 2024-05-22

## 2024-05-18 RX ORDER — LOSARTAN POTASSIUM 100 MG/1
100 TABLET, FILM COATED ORAL DAILY
Refills: 0 | Status: DISCONTINUED | OUTPATIENT
Start: 2024-05-18 | End: 2024-05-22

## 2024-05-18 RX ADMIN — LIDOCAINE 1 PATCH: 4 CREAM TOPICAL at 23:57

## 2024-05-18 RX ADMIN — CYCLOBENZAPRINE HYDROCHLORIDE 10 MILLIGRAM(S): 10 TABLET, FILM COATED ORAL at 23:57

## 2024-05-18 RX ADMIN — Medication 400 MILLIGRAM(S): at 21:16

## 2024-05-18 NOTE — ED ADULT TRIAGE NOTE - CHIEF COMPLAINT QUOTE
Pt c/o rt sided sharp pain radiating to back x few days, endorsing "heavy breathing," unrelieved by muscle relaxant. Respirations even and unlabored. pmhx: dm2, htn

## 2024-05-18 NOTE — ED PROVIDER NOTE - OBJECTIVE STATEMENT
58-year-old female past medical history of lung cancer, hypertension, diabetes presenting emergency department due to chest pain rating to the back which started on Thursday.  Patient states the pain is on the right side of the chest and worsens with taking a deep breath.  She also states the pain is associated with shortness of breath.  Patient denies any fevers, chills, body aches, cough, congestion.

## 2024-05-18 NOTE — ED CDU PROVIDER INITIAL DAY NOTE - OBJECTIVE STATEMENT
Patient is a 58-year-old female with past medical history of hypertension, diabetes mellitus type 2, lung cancer status post right upper lobectomy presents with pleuritic chest pain x 3 days.  Patient reports right anterior, right lateral, or right posterior chest wall pain which occurs with deep inhalation.  Patient reports associated dyspnea.  Patient reports chest pain is nonpositional, nonexertional.  Patient denies any fevers, chills, palpitations, cough, hemoptysis, abdominal pain, nausea, vomiting, diarrhea.  In the emergency department, D-dimer was 223.  Patient has CT chest abdomen and pelvis without contrast performed with following impression: "No acute findings in the chest. Right upper lobectomy with stable   postoperative changes. Acute sigmoid diverticulitis with numerous sigmoid diverticula and thickening of the walls of the mid sigmoid colon with surrounding inflammatory changes and numerous prominent perisigmoid lymph nodes. Large stool filled diverticulum arising from the left lateral wall of the thickened segment of sigmoid measuring up to 2.9 cm likely representing a giant diverticulum and additional air and stool filled pocket contained within the right sigmoid wall measuring 2.1 x 2.5 cm which may represent an additional giant diverticulum or intramural abscess. No feli perforation. Endoscopic evaluation is recommended after appropriate clinical management to exclude underlying malignancy."  Patient reports 3 to 4 months of generalized abdominal pain with which patient had outpatient evaluation which revealed positive H. pylori test for which patient recently completed treatment for H. pylori, with which abdominal pain resolved.  Patient states she is scheduled to have a colonoscopy in a few weeks.  Patient was placed in the observation unit for VQ scan.

## 2024-05-18 NOTE — ED PROVIDER NOTE - ATTENDING CONTRIBUTION TO CARE
Attending MD Hays:  I performed a history and physical exam of the patient and discussed their management with the resident. I reviewed the resident's note and agree with the documented findings and plan of care. My medical decision making and observations are found above.

## 2024-05-18 NOTE — ED CDU PROVIDER INITIAL DAY NOTE - NSICDXFAMILYHX_GEN_ALL_CORE_FT
FAMILY HISTORY:  Father  Still living? Yes, Estimated age: 81-90  Family history of type 2 diabetes mellitus, Age at diagnosis: Age Unknown    Mother  Still living? Yes, Estimated age: 81-90  Family history of type 2 diabetes mellitus, Age at diagnosis: Age Unknown     FAMILY HISTORY:  Father  Still living? Yes, Estimated age: 81-90  Family history of type 2 diabetes mellitus, Age at diagnosis: Age Unknown    Mother  Still living? Yes, Estimated age: 81-90  Family history of type 2 diabetes mellitus, Age at diagnosis: Age Unknown    Sibling  Still living? Unknown  Family history of heart attack, Age at diagnosis: Age Unknown

## 2024-05-18 NOTE — ED CDU PROVIDER INITIAL DAY NOTE - ATTENDING CONTRIBUTION TO CARE
Attending MD Hays.  Pt's CTAP read noted.  Pt's abdomen soft, non-tender today.  PT endorses abd'l pain ~1 mo ago but was txed for + H. Pylori as outpt.  Pt to be made aware of findings and need for outpt GI f/u if V/Q scan non-actionable.  Pt hemodynamically stable.  CDU called for obs stay for ongoing care/V/Q scan and pain control in light of poorly tolerated CT contrast in past.  Of note - pt's dimer not elevated.  Dimer sent for academic interest/educational purposes, not for r/o.  Pt has hx of lung ca with sig R pleuritic CP today.  PE risk too high for dimer r/o.  Pt warrants V/Q.

## 2024-05-18 NOTE — ED ADULT NURSE REASSESSMENT NOTE - NS ED NURSE REASSESS COMMENT FT1
report given to CDU NOEL odell. pt transported CDU spot9 with all pt belongings.   rpt fsg taken per chart, noted to be 157.

## 2024-05-18 NOTE — ED PROVIDER NOTE - CLINICAL SUMMARY MEDICAL DECISION MAKING FREE TEXT BOX
Attending MD Hays.  Agree with above.  Pt is a 57 yo fem with pmhx of DMII, HTN and lung CA remotely (resected, no chemo or radaition), remote cholecystectomy who presents to Ed with severe R chest and R back pain since Thursday.  Pt endorses being txed with antibiotics for H. Pylori as an outpt and completed course of abxs on Friday for this.  Endorses improvement in the abd'l discomfort that lead to H. Pylori dx but RUQ pain/R chest and R shoulder pain began suddenly on Thursday assoc with worse pain with deep inspiration. Pt denies LE edema/pain.  No hx of blood clots previously.  No AC on board.  No LE edema noted on exam in ED. Concern for malignancy recurrence vs. PE vs. RUQ pathology.  Of note, pt has hx of allergy to IV contrast for CT and had a scan with CT scan contrast ~1 mo ago for which she was pretreated but endorses several days allergic reaction following admin.  Wishes to defer at this time.  Pt appears well but uncomfortable, VS's non-actionable.  Planned non-con CT chest/abd/pelvis for screening for large mass/malignancy.  Will likely pursue inpt stay for V/Q scan for perfusion/PE study 2/2 hx of poor contrast tolerance in past, even when appropriately pretreated.

## 2024-05-18 NOTE — ED ADULT NURSE NOTE - OBJECTIVE STATEMENT
58 year old female received to intake AAOx4 ambulatory. pt c/o right sided chest pain radiating to right upper back rating 8/10 at this time, described as "sharp" and "heaviness". pt endorses worse with inspiration and heavy breathing. pt had right lobectomy 2021 for lung ca. respirations even and unlabored. skin intact. PIV #20 left forearm, labs drawn and sent. vs as noted. meds given as ordered. bed in lowest position, call bell within reach.

## 2024-05-18 NOTE — ED PROVIDER NOTE - PROGRESS NOTE DETAILS
Attending MD Hays.  Pt's CTAP read noted.  Pt's abdomen soft, non-tender today.  PT endorses abd'l pain ~1 mo ago but was txed for + H. Pylori as outpt.  Pt to be made aware of findings and need for outpt GI f/u if V/Q scan non-actionable.  Pt hemodynamically stable.  CDU called for obs stay for ongoing care/V/Q scan and pain control in light of poorly tolerated CT contrast in past.

## 2024-05-19 ENCOUNTER — TRANSCRIPTION ENCOUNTER (OUTPATIENT)
Age: 59
End: 2024-05-19

## 2024-05-19 VITALS
RESPIRATION RATE: 17 BRPM | SYSTOLIC BLOOD PRESSURE: 115 MMHG | HEART RATE: 81 BPM | TEMPERATURE: 98 F | DIASTOLIC BLOOD PRESSURE: 75 MMHG | OXYGEN SATURATION: 99 %

## 2024-05-19 LAB
ADD ON TEST-SPECIMEN IN LAB: SIGNIFICANT CHANGE UP
ADD ON TEST-SPECIMEN IN LAB: SIGNIFICANT CHANGE UP
ALBUMIN SERPL ELPH-MCNC: 3.5 G/DL — SIGNIFICANT CHANGE UP (ref 3.3–5)
ALP SERPL-CCNC: 44 U/L — SIGNIFICANT CHANGE UP (ref 40–120)
ALT FLD-CCNC: 46 U/L — HIGH (ref 4–33)
ANION GAP SERPL CALC-SCNC: 13 MMOL/L — SIGNIFICANT CHANGE UP (ref 7–14)
AST SERPL-CCNC: 33 U/L — HIGH (ref 4–32)
BASOPHILS # BLD AUTO: 0.02 K/UL — SIGNIFICANT CHANGE UP (ref 0–0.2)
BASOPHILS NFR BLD AUTO: 0.4 % — SIGNIFICANT CHANGE UP (ref 0–2)
BILIRUB SERPL-MCNC: 0.4 MG/DL — SIGNIFICANT CHANGE UP (ref 0.2–1.2)
BUN SERPL-MCNC: 10 MG/DL — SIGNIFICANT CHANGE UP (ref 7–23)
CALCIUM SERPL-MCNC: 9.1 MG/DL — SIGNIFICANT CHANGE UP (ref 8.4–10.5)
CHLORIDE SERPL-SCNC: 106 MMOL/L — SIGNIFICANT CHANGE UP (ref 98–107)
CO2 SERPL-SCNC: 22 MMOL/L — SIGNIFICANT CHANGE UP (ref 22–31)
CREAT SERPL-MCNC: 0.58 MG/DL — SIGNIFICANT CHANGE UP (ref 0.5–1.3)
EGFR: 105 ML/MIN/1.73M2 — SIGNIFICANT CHANGE UP
EOSINOPHIL # BLD AUTO: 0.08 K/UL — SIGNIFICANT CHANGE UP (ref 0–0.5)
EOSINOPHIL NFR BLD AUTO: 1.8 % — SIGNIFICANT CHANGE UP (ref 0–6)
GLUCOSE SERPL-MCNC: 167 MG/DL — HIGH (ref 70–99)
HCT VFR BLD CALC: 33.2 % — LOW (ref 34.5–45)
HGB BLD-MCNC: 10.8 G/DL — LOW (ref 11.5–15.5)
IANC: 2.17 K/UL — SIGNIFICANT CHANGE UP (ref 1.8–7.4)
IMM GRANULOCYTES NFR BLD AUTO: 0.2 % — SIGNIFICANT CHANGE UP (ref 0–0.9)
LYMPHOCYTES # BLD AUTO: 1.8 K/UL — SIGNIFICANT CHANGE UP (ref 1–3.3)
LYMPHOCYTES # BLD AUTO: 40.2 % — SIGNIFICANT CHANGE UP (ref 13–44)
MCHC RBC-ENTMCNC: 25.8 PG — LOW (ref 27–34)
MCHC RBC-ENTMCNC: 32.5 GM/DL — SIGNIFICANT CHANGE UP (ref 32–36)
MCV RBC AUTO: 79.4 FL — LOW (ref 80–100)
MONOCYTES # BLD AUTO: 0.4 K/UL — SIGNIFICANT CHANGE UP (ref 0–0.9)
MONOCYTES NFR BLD AUTO: 8.9 % — SIGNIFICANT CHANGE UP (ref 2–14)
NEUTROPHILS # BLD AUTO: 2.17 K/UL — SIGNIFICANT CHANGE UP (ref 1.8–7.4)
NEUTROPHILS NFR BLD AUTO: 48.5 % — SIGNIFICANT CHANGE UP (ref 43–77)
NRBC # BLD: 0 /100 WBCS — SIGNIFICANT CHANGE UP (ref 0–0)
NRBC # FLD: 0 K/UL — SIGNIFICANT CHANGE UP (ref 0–0)
PLATELET # BLD AUTO: 269 K/UL — SIGNIFICANT CHANGE UP (ref 150–400)
POTASSIUM SERPL-MCNC: 4.5 MMOL/L — SIGNIFICANT CHANGE UP (ref 3.5–5.3)
POTASSIUM SERPL-SCNC: 4.5 MMOL/L — SIGNIFICANT CHANGE UP (ref 3.5–5.3)
PROT SERPL-MCNC: 6.7 G/DL — SIGNIFICANT CHANGE UP (ref 6–8.3)
RBC # BLD: 4.18 M/UL — SIGNIFICANT CHANGE UP (ref 3.8–5.2)
RBC # FLD: 15.4 % — HIGH (ref 10.3–14.5)
SODIUM SERPL-SCNC: 141 MMOL/L — SIGNIFICANT CHANGE UP (ref 135–145)
WBC # BLD: 4.48 K/UL — SIGNIFICANT CHANGE UP (ref 3.8–10.5)
WBC # FLD AUTO: 4.48 K/UL — SIGNIFICANT CHANGE UP (ref 3.8–10.5)

## 2024-05-19 PROCEDURE — 71046 X-RAY EXAM CHEST 2 VIEWS: CPT | Mod: 26

## 2024-05-19 PROCEDURE — 99239 HOSP IP/OBS DSCHRG MGMT >30: CPT

## 2024-05-19 PROCEDURE — 78582 LUNG VENTILAT&PERFUS IMAGING: CPT | Mod: 26,GC,MC

## 2024-05-19 RX ORDER — KETOROLAC TROMETHAMINE 30 MG/ML
15 SYRINGE (ML) INJECTION ONCE
Refills: 0 | Status: DISCONTINUED | OUTPATIENT
Start: 2024-05-19 | End: 2024-05-19

## 2024-05-19 RX ORDER — FAMOTIDINE 10 MG/ML
20 INJECTION INTRAVENOUS ONCE
Refills: 0 | Status: COMPLETED | OUTPATIENT
Start: 2024-05-19 | End: 2024-05-19

## 2024-05-19 RX ORDER — LIDOCAINE 4 G/100G
1 CREAM TOPICAL ONCE
Refills: 0 | Status: COMPLETED | OUTPATIENT
Start: 2024-05-19 | End: 2024-05-19

## 2024-05-19 RX ADMIN — Medication 15 MILLIGRAM(S): at 09:43

## 2024-05-19 RX ADMIN — LIDOCAINE 1 PATCH: 4 CREAM TOPICAL at 07:27

## 2024-05-19 RX ADMIN — LOSARTAN POTASSIUM 100 MILLIGRAM(S): 100 TABLET, FILM COATED ORAL at 06:09

## 2024-05-19 RX ADMIN — Medication 15 MILLIGRAM(S): at 10:13

## 2024-05-19 RX ADMIN — LIDOCAINE 1 PATCH: 4 CREAM TOPICAL at 09:42

## 2024-05-19 RX ADMIN — FAMOTIDINE 20 MILLIGRAM(S): 10 INJECTION INTRAVENOUS at 10:26

## 2024-05-19 RX ADMIN — AMLODIPINE BESYLATE 2.5 MILLIGRAM(S): 2.5 TABLET ORAL at 06:09

## 2024-05-19 NOTE — DISCHARGE NOTE NURSING/CASE MANAGEMENT/SOCIAL WORK - PATIENT PORTAL LINK FT
You can access the FollowMyHealth Patient Portal offered by [facility] by registering at the following website: [email or web address]. By joining [employer]’s FollowMyHealth portal, you will also be able to view your health information using other applications (apps) compatible with our system.

## 2024-05-19 NOTE — ED CDU PROVIDER DISPOSITION NOTE - CLINICAL COURSE
AWILDA Mc:  58-year-old female with history of hypertension, diabetes, lung cancer status post right upper lobe lobectomy presented to the emergency department complaining of pleuritic chest pain x 3 days.  Patient placed in CDU for VQ scan as patient is unable to tolerate CT scan with IV contrast.  VQ scan no evidence of PE.  Pt feels well, ambulatory without difficulty.  CT showing "IMPRESSION:  No acute findings in the chest. Right upper lobectomy with stable postoperative changes.  Acute sigmoid diverticulitis with numerous sigmoid diverticula and thickening of the walls of the mid sigmoid colon with surrounding inflammatory changes and numerous prominent perisigmoid lymph nodes. Large stool filled diverticulum arising from the left lateral wall of the thickened segment of sigmoid measuring up to 2.9 cm likely representing a giant diverticulum and additional air and stool filled pocket contained   within the right sigmoid wall measuring 2.1 x 2.5 cm which may represent an additional giant diverticulum or intramural abscess. No feli perforation. Endoscopic evaluation is recommended after appropriate clinical management to exclude underlying malignancy."  Pt with no GI complaints abdomen is soft non tender, pt advised of results pt has a Gastroenterologist and states she will follow up this week. Pt to follow up with her PMD this week.  Copy of results provided to the patient.  Discharge and results reviewed with patient.  Pt's  at bedside.

## 2024-05-19 NOTE — ED CDU PROVIDER DISPOSITION NOTE - PATIENT PORTAL LINK FT
You can access the FollowMyHealth Patient Portal offered by [facility] by registering at the following website: [email or web address]. By joining [employer]’s FollowMyHealth portal, you will also be able to view your health information using other applications (apps) compatible with our system.

## 2024-05-19 NOTE — ED CDU PROVIDER DISPOSITION NOTE - ATTENDING CONTRIBUTION TO CARE
58-year-old female past medical history hypertension, diabetes, lung cancer status post lobectomy, status post cholecystectomy with pleuritic right chest pain/upper thoracic pain.  Patient had labs, CT without contrast in the ED.  Of note, CT demonstrates sigmoid diverticulitis with possible large diverticulum versus abscess.  Patient has no lower abdominal pain, denies any lower abdominal pain.  He denies any fevers chills nausea or vomiting.  Per ED team doubt diverticulitis based on clinical exam and reported symptoms.  In ED patient had negative D-dimer however raise clinical suspicion for possible VTE and patient placed in CDU for VQ scan.  Discussed with patient IV contrast, reports remote CT with itchiness afterwards did have CT with IV contrast approximately 1 month ago with steroid pretreatment after which patient reports achiness throughout her body and?  Throat swelling.  Patient reports symptoms improved after use of Benadryl.    Exam as above, mild epigastric tenderness, no lower abdominal tenderness, no rebound, no guarding, patient well-appearing no acute distress.  No rash.    Plan: Chest x-ray, VQ scan, symptom relief as needed.      Based on clinical history and exam very low suspicion for diverticulitis.  Discussed findings with patient and she understands if starts having symptoms of abdominal pain, fevers, chills.    Patient with no significant abnormality on chest x-ray or VQ scan.  Stable for discharge with outpatient follow-up.

## 2024-05-19 NOTE — ED CDU PROVIDER SUBSEQUENT DAY NOTE - ATTENDING APP SHARED VISIT CONTRIBUTION OF CARE
58-year-old female past medical history hypertension, diabetes, lung cancer status post lobectomy, status post cholecystectomy with pleuritic right chest pain/upper thoracic pain.  Patient had labs, CT without contrast in the ED.  Of note, CT demonstrates sigmoid diverticulitis with possible large diverticulum versus abscess.  Patient has no lower abdominal pain, denies any lower abdominal pain.  He denies any fevers chills nausea or vomiting.  Per ED team doubt diverticulitis based on clinical exam and reported symptoms.  In ED patient had negative D-dimer however raise clinical suspicion for possible VTE and patient placed in CDU for VQ scan.  Discussed with patient IV contrast, reports remote CT with itchiness afterwards did have CT with IV contrast approximately 1 month ago with steroid pretreatment after which patient reports achiness throughout her body and?  Throat swelling.  Patient reports symptoms improved after use of Benadryl.    Exam as above, mild epigastric tenderness, no lower abdominal tenderness, no rebound, no guarding, patient well-appearing no acute distress.  No rash.    Plan: Chest x-ray, VQ scan, symptom relief as needed.  Based on clinical history and exam very low suspicion for diverticulitis.  Discussed findings with patient and she understands if starts having symptoms of abdominal pain, fevers, chills

## 2024-05-19 NOTE — ED CDU PROVIDER SUBSEQUENT DAY NOTE - PROGRESS NOTE DETAILS
AWILDA Mc:  58-year-old female with a history of hypertension, diabetes, lung cancer status post right upper lobe lobectomy presented to the emergency department complaining of pleuritic chest pain x 3 days.  Patient placed in CDU for VQ scan as patient is unable to tolerate CT scan with IV contrast.  VQ scan no evidence of PE.  Pt feels well, ambulatory without difficulty.  CT showing "IMPRESSION:  No acute findings in the chest. Right upper lobectomy with stable postoperative changes.  Acute sigmoid diverticulitis with numerous sigmoid diverticula and thickening of the walls of the mid sigmoid colon with surrounding inflammatory changes and numerous prominent perisigmoid lymph nodes. Large stool filled diverticulum arising from the left lateral wall of the thickened segment of sigmoid measuring up to 2.9 cm likely representing a giant diverticulum and additional air and stool filled pocket contained   within the right sigmoid wall measuring 2.1 x 2.5 cm which may represent an additional giant diverticulum or intramural abscess. No feli perforation. Endoscopic evaluation is recommended after appropriate clinical management to exclude underlying malignancy."  Pt with no GI complaints abdomen is soft non tender, pt advised of results pt has a Gastroenterologist and states she will follow up this week. Pt to follow up with her PMD this week.  Copy of results provided to the patient.  Discharge and results reviewed with patient.  Pt's  at bedside.

## 2024-05-19 NOTE — ED CDU PROVIDER SUBSEQUENT DAY NOTE - CONSTITUTIONAL, MLM
Spoke with Vida informed we did not locate any paper work if she would please re fax it. Vida stated she thought it was done online, writer informed her they would need to fax paper work MD would sign and we would fax back. Salma stated she will have [facility] contact us directly.    normal... Well appearing, awake, alert, oriented to person, place, time/situation and in no apparent distress.

## 2024-05-19 NOTE — ED CDU PROVIDER SUBSEQUENT DAY NOTE - NSICDXFAMILYHX_GEN_ALL_CORE_FT
FAMILY HISTORY:  Father  Still living? Yes, Estimated age: 81-90  Family history of type 2 diabetes mellitus, Age at diagnosis: Age Unknown    Mother  Still living? Yes, Estimated age: 81-90  Family history of type 2 diabetes mellitus, Age at diagnosis: Age Unknown    Sibling  Still living? Unknown  Family history of heart attack, Age at diagnosis: Age Unknown

## 2024-05-19 NOTE — ED CDU PROVIDER DISPOSITION NOTE - NSFOLLOWUPINSTRUCTIONS_ED_ALL_ED_FT
Follow up with your Primary Medical Doctor in 1-2 days.  Follow up with your Gastroenterologist in 1-2 days bring a copy of your results.  Rest.  Stay well hydrated.  Return to the ER for any persistent/worsening or new symptoms weakness, dizziness, chest pain, shortness of breath, or any concerning symptoms,.

## 2024-08-08 ENCOUNTER — OUTPATIENT (OUTPATIENT)
Dept: OUTPATIENT SERVICES | Facility: HOSPITAL | Age: 59
LOS: 1 days | End: 2024-08-08

## 2024-08-08 VITALS
DIASTOLIC BLOOD PRESSURE: 95 MMHG | WEIGHT: 128.09 LBS | HEIGHT: 59.5 IN | HEART RATE: 87 BPM | TEMPERATURE: 98 F | RESPIRATION RATE: 18 BRPM | OXYGEN SATURATION: 98 % | SYSTOLIC BLOOD PRESSURE: 146 MMHG

## 2024-08-08 DIAGNOSIS — K57.32 DIVERTICULITIS OF LARGE INTESTINE WITHOUT PERFORATION OR ABSCESS WITHOUT BLEEDING: ICD-10-CM

## 2024-08-08 DIAGNOSIS — Z90.49 ACQUIRED ABSENCE OF OTHER SPECIFIED PARTS OF DIGESTIVE TRACT: Chronic | ICD-10-CM

## 2024-08-08 DIAGNOSIS — E11.9 TYPE 2 DIABETES MELLITUS WITHOUT COMPLICATIONS: ICD-10-CM

## 2024-08-08 DIAGNOSIS — I10 ESSENTIAL (PRIMARY) HYPERTENSION: ICD-10-CM

## 2024-08-08 DIAGNOSIS — Z90.2 ACQUIRED ABSENCE OF LUNG [PART OF]: Chronic | ICD-10-CM

## 2024-08-08 LAB
A1C WITH ESTIMATED AVERAGE GLUCOSE RESULT: 6.5 % — HIGH (ref 4–5.6)
ANION GAP SERPL CALC-SCNC: 14 MMOL/L — SIGNIFICANT CHANGE UP (ref 7–14)
BUN SERPL-MCNC: 12 MG/DL — SIGNIFICANT CHANGE UP (ref 7–23)
CALCIUM SERPL-MCNC: 9.7 MG/DL — SIGNIFICANT CHANGE UP (ref 8.4–10.5)
CHLORIDE SERPL-SCNC: 102 MMOL/L — SIGNIFICANT CHANGE UP (ref 98–107)
CO2 SERPL-SCNC: 25 MMOL/L — SIGNIFICANT CHANGE UP (ref 22–31)
CREAT SERPL-MCNC: 0.58 MG/DL — SIGNIFICANT CHANGE UP (ref 0.5–1.3)
EGFR: 105 ML/MIN/1.73M2 — SIGNIFICANT CHANGE UP
ESTIMATED AVERAGE GLUCOSE: 140 — SIGNIFICANT CHANGE UP
GLUCOSE SERPL-MCNC: 128 MG/DL — HIGH (ref 70–99)
POTASSIUM SERPL-MCNC: 4 MMOL/L — SIGNIFICANT CHANGE UP (ref 3.5–5.3)
POTASSIUM SERPL-SCNC: 4 MMOL/L — SIGNIFICANT CHANGE UP (ref 3.5–5.3)
SODIUM SERPL-SCNC: 141 MMOL/L — SIGNIFICANT CHANGE UP (ref 135–145)

## 2024-08-08 NOTE — H&P PST ADULT - HISTORY OF PRESENT ILLNESS
59 yr old female with medical hx HTN ,T2DM  and pmhx  latent TB infection (2007); Recent CT chest on 6/2021, right lung cancer  s/p right upper lobectomy presents for preop evaluation with c/o abdominal pains started ~ 6 months ago.  Patient was evaluated in [facility] ED May 2024 s/p ct scan which revealed         Patient was referred to GI s/p colonoscopy  which revealed polyps and diverticulitis.  Patient is now scheduled for Laparoscopic Sigmoid Resection on 08/12/24. 59 yr old female with medical hx HTN ,T2DM  and pmhx  latent TB infection (2007); Recent CT chest on 6/2021, right lung cancer  s/p right upper lobectomy presents for preop evaluation with c/o abdominal pains started ~ 6 months ago.  Patient was evaluated in [facility] ED May 2024 s/p ct scan which revealed acute diverticulitis.  Patient was referred to GI s/p colonoscopy  which revealed polyps and diverticulitis.  Patient is now scheduled for Laparoscopic Sigmoid Resection on 08/12/24.

## 2024-08-08 NOTE — H&P PST ADULT - LYMPHATIC
posterior cervical L/posterior cervical R/anterior cervical L/anterior cervical R/supraclavicular L/supraclavicular R posterior cervical L/posterior cervical R/anterior cervical L/anterior cervical R

## 2024-08-08 NOTE — H&P PST ADULT - GASTROINTESTINAL
details… soft/nontender/nondistended/normal active bowel sounds/no guarding/no rigidity/no organomegaly/no palpable glenn

## 2024-08-08 NOTE — H&P PST ADULT - NSICDXPASTMEDICALHX_GEN_ALL_CORE_FT
PAST MEDICAL HISTORY:  Diverticulitis large intestine w/o perforation or abscess w/o bleeding     Hypertension     Lung cancer     Type 2 diabetes mellitus

## 2024-08-09 LAB
BASOPHILS # BLD AUTO: 0.03 K/UL — SIGNIFICANT CHANGE UP (ref 0–0.2)
BASOPHILS NFR BLD AUTO: 0.4 % — SIGNIFICANT CHANGE UP (ref 0–2)
BLD GP AB SCN SERPL QL: NEGATIVE — SIGNIFICANT CHANGE UP
EOSINOPHIL # BLD AUTO: 0.07 K/UL — SIGNIFICANT CHANGE UP (ref 0–0.5)
EOSINOPHIL NFR BLD AUTO: 0.9 % — SIGNIFICANT CHANGE UP (ref 0–6)
HCT VFR BLD CALC: 42.1 % — SIGNIFICANT CHANGE UP (ref 34.5–45)
HGB BLD-MCNC: 13.7 G/DL — SIGNIFICANT CHANGE UP (ref 11.5–15.5)
IMM GRANULOCYTES NFR BLD AUTO: 0.1 % — SIGNIFICANT CHANGE UP (ref 0–0.9)
LYMPHOCYTES # BLD AUTO: 2.44 K/UL — SIGNIFICANT CHANGE UP (ref 1–3.3)
LYMPHOCYTES # BLD AUTO: 33.1 % — SIGNIFICANT CHANGE UP (ref 13–44)
MCHC RBC-ENTMCNC: 27.2 PG — SIGNIFICANT CHANGE UP (ref 27–34)
MCHC RBC-ENTMCNC: 32.5 GM/DL — SIGNIFICANT CHANGE UP (ref 32–36)
MCV RBC AUTO: 83.5 FL — SIGNIFICANT CHANGE UP (ref 80–100)
MONOCYTES # BLD AUTO: 0.52 K/UL — SIGNIFICANT CHANGE UP (ref 0–0.9)
MONOCYTES NFR BLD AUTO: 7.1 % — SIGNIFICANT CHANGE UP (ref 2–14)
NEUTROPHILS # BLD AUTO: 4.3 K/UL — SIGNIFICANT CHANGE UP (ref 1.8–7.4)
NEUTROPHILS NFR BLD AUTO: 58.4 % — SIGNIFICANT CHANGE UP (ref 43–77)
PLATELET # BLD AUTO: 262 K/UL — SIGNIFICANT CHANGE UP (ref 150–400)
RBC # BLD: 5.04 M/UL — SIGNIFICANT CHANGE UP (ref 3.8–5.2)
RBC # FLD: 15.1 % — HIGH (ref 10.3–14.5)
RH IG SCN BLD-IMP: NEGATIVE — SIGNIFICANT CHANGE UP
WBC # BLD: 7.37 K/UL — SIGNIFICANT CHANGE UP (ref 3.8–10.5)
WBC # FLD AUTO: 7.37 K/UL — SIGNIFICANT CHANGE UP (ref 3.8–10.5)

## 2024-08-11 ENCOUNTER — TRANSCRIPTION ENCOUNTER (OUTPATIENT)
Age: 59
End: 2024-08-11

## 2024-08-12 RX ORDER — AMLODIPINE BESYLATE 2.5 MG/1
1 TABLET ORAL
Refills: 0 | DISCHARGE

## 2024-08-12 RX ORDER — LOSARTAN POTASSIUM 50 MG/1
1 TABLET, FILM COATED ORAL
Refills: 0 | DISCHARGE

## 2024-08-15 ENCOUNTER — TRANSCRIPTION ENCOUNTER (OUTPATIENT)
Age: 59
End: 2024-08-15

## 2024-09-25 ENCOUNTER — APPOINTMENT (OUTPATIENT)
Dept: PULMONOLOGY | Facility: CLINIC | Age: 59
End: 2024-09-25
Payer: MEDICAID

## 2024-09-25 VITALS
BODY MASS INDEX: 23.98 KG/M2 | TEMPERATURE: 98.5 F | WEIGHT: 127 LBS | HEIGHT: 61 IN | SYSTOLIC BLOOD PRESSURE: 144 MMHG | RESPIRATION RATE: 15 BRPM | OXYGEN SATURATION: 99 % | DIASTOLIC BLOOD PRESSURE: 85 MMHG | HEART RATE: 79 BPM

## 2024-09-25 DIAGNOSIS — Z85.118 PERSONAL HISTORY OF OTHER MALIGNANT NEOPLASM OF BRONCHUS AND LUNG: ICD-10-CM

## 2024-09-25 DIAGNOSIS — R91.1 SOLITARY PULMONARY NODULE: ICD-10-CM

## 2024-09-25 DIAGNOSIS — M79.2 NEURALGIA AND NEURITIS, UNSPECIFIED: ICD-10-CM

## 2024-09-25 PROCEDURE — 99214 OFFICE O/P EST MOD 30 MIN: CPT

## 2024-09-25 NOTE — PHYSICAL EXAM
[No Acute Distress] : no acute distress [Well Nourished] : well nourished [Well Developed] : well developed [Normal Oropharynx] : normal oropharynx [Normal Rate/Rhythm] : normal rate/rhythm [Normal S1, S2] : normal s1, s2 [No Resp Distress] : no resp distress [Clear to Auscultation Bilaterally] : clear to auscultation bilaterally [No Edema] : no edema [Oriented x3] : oriented x3 [Normal Affect] : normal affect

## 2024-09-25 NOTE — ASSESSMENT
[FreeTextEntry1] : 59 F with hx of DM, HTN, latent TB s/p treatment. She is s/p right VATS, robotic assisted and open right upper lobectomy in 8/2021 by Dr. Barrios for RUL opacity. Pathology showed adenocarcinoma, acinar predominant, lepidic. 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 Her course was complicated by right chest neuropathic pain. She was treated with low dose gabapentin. She discontinued gabapentin due to dizziness and constipation.   She presented to the ED in May 2024 s/o chest pain x 3 days. VQ and Chest CT were negative. (CTPA was not done because pt reported intolerance of contrast). workup was negative. She remained for observation and then discharged.  She was admitted in August for acute diverticulitis and is s/p sigmoid colon resection.  She presents for routine follow up today. Feels well. Denies respiratory sx. Remains active. right chest neuropathic pain on/off. not on any inhalers.   Plan: Follow up annually with CT chest

## 2024-09-25 NOTE — HISTORY OF PRESENT ILLNESS
[TextBox_4] : 58 yo woman, originally from [place]. History of Type 2 DM, hypertension, treatment for latent TB infection, recent CT chest 6/2021 showing posterior ruight upper lobe opacity. Patient denied any infectious symptoms, never used tobacco. She had 3 sputum AFBs negative. Underwent bronchoscopy with biopsy on 7/2/21 which was inconclusive. Subsequently referred to Dr. Barrios who performed right VATS, robotic assisted and open right upper lobectomy on 8/2021. Pathology showed adenocarcinoma, acinar predominant, lepidic. 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 Her course was complicated by right chest neuropathic pain. She was treated with low dose gabapentin but had to stop due to dizziness.  Patient continues to have sharp neuropathic pain at the VATS sites. Denies shortness of breath, no cough no phlegm. Not on any inhalers. She has had no fevers, chills, night sweats, or recent unintentional weight loss. She is due for a repeat CT chest non.  Last seen in march 2023: She has persistent dizziness. Was taking meclizine which provided some relief however stopped. Recommended she discuss this with her PCP and perhaps a neurology referral. She was advised to follow with Dr Barrios and PCP, as well as have a CT in may 2023, which were not done then.  She presents today for routine follow. She did have an ED visit in may 2024 for chest pain. VQ and Ct chest were negative. (CTPA was not done because pt reported she does not tolerate contrast). workup was negative, she remained in observation and then discharged. She had a recent admission at [facility] 8/12-8/15/24 for acute diverticulitis. She is s/p laparoscopic sigmoid resection.  She is feeling well today. Denies shortness of breath, cough or sputum. Will be getting flu vaccine next month. No longer on Gabapentin for neuropathic pain, which she still experiences on/off.

## 2024-09-25 NOTE — END OF VISIT
[FreeTextEntry3] :   I, Dr. Mcarthur, personally performed the evaluation and management (E/M) services for this established patient who presents today with (a) new problem(s)/exacerbation of (an) existing condition(s). That E/M includes conducting the clinically appropriate interval history &/or exam, assessing all new/exacerbated conditions, and establishing a new plan of care. Today, my ANNETTE, [employer], was here to observe my evaluation and management service for this new problem/exacerbated condition and follow the plan of care established by me going forward. [Time Spent: ___ minutes] : I have spent [unfilled] minutes of time on the encounter which excludes teaching and separately reported services.

## 2025-04-28 ENCOUNTER — NON-APPOINTMENT (OUTPATIENT)
Age: 60
End: 2025-04-28

## 2025-04-30 ENCOUNTER — APPOINTMENT (OUTPATIENT)
Dept: PULMONOLOGY | Facility: CLINIC | Age: 60
End: 2025-04-30
Payer: MEDICAID

## 2025-04-30 VITALS
DIASTOLIC BLOOD PRESSURE: 83 MMHG | WEIGHT: 135 LBS | BODY MASS INDEX: 25.49 KG/M2 | OXYGEN SATURATION: 99 % | SYSTOLIC BLOOD PRESSURE: 132 MMHG | HEART RATE: 88 BPM | HEIGHT: 61 IN

## 2025-04-30 DIAGNOSIS — R06.83 SNORING: ICD-10-CM

## 2025-04-30 PROCEDURE — G2211 COMPLEX E/M VISIT ADD ON: CPT | Mod: NC

## 2025-04-30 PROCEDURE — 99213 OFFICE O/P EST LOW 20 MIN: CPT

## 2025-05-21 ENCOUNTER — APPOINTMENT (OUTPATIENT)
Dept: PULMONOLOGY | Facility: CLINIC | Age: 60
End: 2025-05-21
Payer: MEDICAID

## 2025-05-21 PROCEDURE — 95800 SLP STDY UNATTENDED: CPT

## 2025-05-22 PROCEDURE — 95800 SLP STDY UNATTENDED: CPT

## 2025-06-18 ENCOUNTER — APPOINTMENT (OUTPATIENT)
Dept: PULMONOLOGY | Facility: CLINIC | Age: 60
End: 2025-06-18
Payer: MEDICAID

## 2025-06-18 VITALS
HEIGHT: 61 IN | OXYGEN SATURATION: 99 % | WEIGHT: 132 LBS | BODY MASS INDEX: 24.92 KG/M2 | SYSTOLIC BLOOD PRESSURE: 130 MMHG | DIASTOLIC BLOOD PRESSURE: 85 MMHG | HEART RATE: 86 BPM

## 2025-06-18 PROCEDURE — G2211 COMPLEX E/M VISIT ADD ON: CPT | Mod: NC

## 2025-06-18 PROCEDURE — 99213 OFFICE O/P EST LOW 20 MIN: CPT
